# Patient Record
Sex: MALE | Race: WHITE | NOT HISPANIC OR LATINO
[De-identification: names, ages, dates, MRNs, and addresses within clinical notes are randomized per-mention and may not be internally consistent; named-entity substitution may affect disease eponyms.]

---

## 2017-01-09 ENCOUNTER — APPOINTMENT (OUTPATIENT)
Dept: INTERNAL MEDICINE | Facility: CLINIC | Age: 82
End: 2017-01-09

## 2017-01-09 VITALS
OXYGEN SATURATION: 97 % | DIASTOLIC BLOOD PRESSURE: 69 MMHG | BODY MASS INDEX: 26.12 KG/M2 | HEIGHT: 64 IN | TEMPERATURE: 97.7 F | SYSTOLIC BLOOD PRESSURE: 110 MMHG | HEART RATE: 57 BPM | WEIGHT: 153 LBS

## 2017-01-09 DIAGNOSIS — R25.2 CRAMP AND SPASM: ICD-10-CM

## 2017-01-10 LAB
25(OH)D3 SERPL-MCNC: 44.1 NG/ML
ALBUMIN SERPL ELPH-MCNC: 4 G/DL
ALP BLD-CCNC: 104 U/L
ALT SERPL-CCNC: 17 U/L
ANION GAP SERPL CALC-SCNC: 15 MMOL/L
AST SERPL-CCNC: 28 U/L
BASOPHILS # BLD AUTO: 0.03 K/UL
BASOPHILS NFR BLD AUTO: 0.3 %
BILIRUB SERPL-MCNC: 0.6 MG/DL
BUN SERPL-MCNC: 33 MG/DL
CALCIUM SERPL-MCNC: 10.3 MG/DL
CHLORIDE SERPL-SCNC: 101 MMOL/L
CHOLEST SERPL-MCNC: 151 MG/DL
CHOLEST/HDLC SERPL: 1.9 RATIO
CK SERPL-CCNC: 146 U/L
CO2 SERPL-SCNC: 20 MMOL/L
CREAT SERPL-MCNC: 1.38 MG/DL
EOSINOPHIL # BLD AUTO: 0.17 K/UL
EOSINOPHIL NFR BLD AUTO: 1.5 %
ERYTHROCYTE [SEDIMENTATION RATE] IN BLOOD BY WESTERGREN METHOD: 49 MM/HR
GLUCOSE SERPL-MCNC: 95 MG/DL
HBA1C MFR BLD HPLC: 5.8 %
HCT VFR BLD CALC: 34.3 %
HDLC SERPL-MCNC: 80 MG/DL
HGB BLD-MCNC: 10.8 G/DL
IMM GRANULOCYTES NFR BLD AUTO: 0.3 %
LDLC SERPL CALC-MCNC: 62 MG/DL
LYMPHOCYTES # BLD AUTO: 1.3 K/UL
LYMPHOCYTES NFR BLD AUTO: 11.5 %
MAN DIFF?: NORMAL
MCHC RBC-ENTMCNC: 31.5 GM/DL
MCHC RBC-ENTMCNC: 31.5 PG
MCV RBC AUTO: 100 FL
MONOCYTES # BLD AUTO: 1.32 K/UL
MONOCYTES NFR BLD AUTO: 11.6 %
NEUTROPHILS # BLD AUTO: 8.5 K/UL
NEUTROPHILS NFR BLD AUTO: 74.8 %
PLATELET # BLD AUTO: 206 K/UL
POTASSIUM SERPL-SCNC: 5.2 MMOL/L
PROT SERPL-MCNC: 7.1 G/DL
RBC # BLD: 3.43 M/UL
RBC # FLD: 14.8 %
SODIUM SERPL-SCNC: 136 MMOL/L
T4 SERPL-MCNC: 7 UG/DL
TRIGL SERPL-MCNC: 43 MG/DL
TSH SERPL-ACNC: 2.27 UU/ML
WBC # FLD AUTO: 11.35 K/UL

## 2017-03-08 ENCOUNTER — APPOINTMENT (OUTPATIENT)
Dept: INTERNAL MEDICINE | Facility: CLINIC | Age: 82
End: 2017-03-08

## 2017-03-08 VITALS
TEMPERATURE: 97.8 F | OXYGEN SATURATION: 86 % | BODY MASS INDEX: 26.12 KG/M2 | HEART RATE: 79 BPM | SYSTOLIC BLOOD PRESSURE: 141 MMHG | HEIGHT: 64 IN | DIASTOLIC BLOOD PRESSURE: 82 MMHG | WEIGHT: 153 LBS

## 2017-03-09 LAB
ALBUMIN SERPL ELPH-MCNC: 4.3 G/DL
ALP BLD-CCNC: 102 U/L
ALT SERPL-CCNC: 19 U/L
ANION GAP SERPL CALC-SCNC: 15 MMOL/L
AST SERPL-CCNC: 32 U/L
BASOPHILS # BLD AUTO: 0.02 K/UL
BASOPHILS NFR BLD AUTO: 0.3 %
BILIRUB SERPL-MCNC: 0.4 MG/DL
BUN SERPL-MCNC: 21 MG/DL
CALCIUM SERPL-MCNC: 10.4 MG/DL
CHLORIDE SERPL-SCNC: 100 MMOL/L
CO2 SERPL-SCNC: 25 MMOL/L
CREAT SERPL-MCNC: 1.01 MG/DL
EOSINOPHIL # BLD AUTO: 0.51 K/UL
EOSINOPHIL NFR BLD AUTO: 6.5 %
ERYTHROCYTE [SEDIMENTATION RATE] IN BLOOD BY WESTERGREN METHOD: 27 MM/HR
GLUCOSE SERPL-MCNC: 106 MG/DL
HCT VFR BLD CALC: 37.7 %
HGB BLD-MCNC: 12 G/DL
IMM GRANULOCYTES NFR BLD AUTO: 0.4 %
LYMPHOCYTES # BLD AUTO: 1.29 K/UL
LYMPHOCYTES NFR BLD AUTO: 16.4 %
MAN DIFF?: NORMAL
MCHC RBC-ENTMCNC: 31.7 PG
MCHC RBC-ENTMCNC: 31.8 GM/DL
MCV RBC AUTO: 99.5 FL
MONOCYTES # BLD AUTO: 1 K/UL
MONOCYTES NFR BLD AUTO: 12.7 %
NEUTROPHILS # BLD AUTO: 5.01 K/UL
NEUTROPHILS NFR BLD AUTO: 63.7 %
PLATELET # BLD AUTO: 194 K/UL
POTASSIUM SERPL-SCNC: 4.8 MMOL/L
PROT SERPL-MCNC: 7.2 G/DL
RBC # BLD: 3.79 M/UL
RBC # FLD: 15.1 %
SODIUM SERPL-SCNC: 140 MMOL/L
WBC # FLD AUTO: 7.86 K/UL

## 2017-04-21 PROBLEM — Z00.00 ENCOUNTER FOR PREVENTIVE HEALTH EXAMINATION: Status: ACTIVE | Noted: 2017-04-21

## 2017-05-03 ENCOUNTER — APPOINTMENT (OUTPATIENT)
Dept: INTERNAL MEDICINE | Facility: CLINIC | Age: 82
End: 2017-05-03

## 2017-06-04 ENCOUNTER — RX RENEWAL (OUTPATIENT)
Age: 82
End: 2017-06-04

## 2017-06-07 ENCOUNTER — APPOINTMENT (OUTPATIENT)
Dept: INTERNAL MEDICINE | Facility: CLINIC | Age: 82
End: 2017-06-07

## 2017-06-07 VITALS
BODY MASS INDEX: 24.43 KG/M2 | SYSTOLIC BLOOD PRESSURE: 131 MMHG | HEIGHT: 66 IN | HEART RATE: 78 BPM | WEIGHT: 152 LBS | DIASTOLIC BLOOD PRESSURE: 67 MMHG | OXYGEN SATURATION: 92 %

## 2017-06-08 LAB
25(OH)D3 SERPL-MCNC: 39.7 NG/ML
ALBUMIN SERPL ELPH-MCNC: 4.4 G/DL
ALP BLD-CCNC: 111 U/L
ALT SERPL-CCNC: 18 U/L
ANION GAP SERPL CALC-SCNC: 15 MMOL/L
AST SERPL-CCNC: 29 U/L
BASOPHILS # BLD AUTO: 0.03 K/UL
BASOPHILS NFR BLD AUTO: 0.4 %
BILIRUB SERPL-MCNC: 0.4 MG/DL
BUN SERPL-MCNC: 24 MG/DL
CALCIUM SERPL-MCNC: 10.3 MG/DL
CHLORIDE SERPL-SCNC: 101 MMOL/L
CHOLEST SERPL-MCNC: 170 MG/DL
CHOLEST/HDLC SERPL: 2 RATIO
CO2 SERPL-SCNC: 24 MMOL/L
CREAT SERPL-MCNC: 1.03 MG/DL
EOSINOPHIL # BLD AUTO: 0.45 K/UL
EOSINOPHIL NFR BLD AUTO: 6.1 %
ERYTHROCYTE [SEDIMENTATION RATE] IN BLOOD BY WESTERGREN METHOD: 36 MM/HR
GLUCOSE SERPL-MCNC: 98 MG/DL
HBA1C MFR BLD HPLC: 6.1 %
HCT VFR BLD CALC: 39.3 %
HDLC SERPL-MCNC: 85 MG/DL
HGB BLD-MCNC: 12.4 G/DL
IMM GRANULOCYTES NFR BLD AUTO: 0.3 %
LDLC SERPL CALC-MCNC: 75 MG/DL
LYMPHOCYTES # BLD AUTO: 1.66 K/UL
LYMPHOCYTES NFR BLD AUTO: 22.7 %
MAN DIFF?: NORMAL
MCHC RBC-ENTMCNC: 31.3 PG
MCHC RBC-ENTMCNC: 31.6 GM/DL
MCV RBC AUTO: 99.2 FL
MONOCYTES # BLD AUTO: 0.83 K/UL
MONOCYTES NFR BLD AUTO: 11.3 %
NEUTROPHILS # BLD AUTO: 4.33 K/UL
NEUTROPHILS NFR BLD AUTO: 59.2 %
PLATELET # BLD AUTO: 198 K/UL
POTASSIUM SERPL-SCNC: 4.9 MMOL/L
PROT SERPL-MCNC: 7.8 G/DL
RBC # BLD: 3.96 M/UL
RBC # FLD: 14.7 %
SODIUM SERPL-SCNC: 140 MMOL/L
T4 SERPL-MCNC: 6.9 UG/DL
TRIGL SERPL-MCNC: 49 MG/DL
TSH SERPL-ACNC: 2.61 UIU/ML
WBC # FLD AUTO: 7.32 K/UL

## 2017-09-18 ENCOUNTER — APPOINTMENT (OUTPATIENT)
Dept: INTERNAL MEDICINE | Facility: CLINIC | Age: 82
End: 2017-09-18
Payer: MEDICARE

## 2017-09-18 VITALS
HEIGHT: 66 IN | BODY MASS INDEX: 24.11 KG/M2 | DIASTOLIC BLOOD PRESSURE: 70 MMHG | HEART RATE: 80 BPM | WEIGHT: 150 LBS | SYSTOLIC BLOOD PRESSURE: 130 MMHG

## 2017-09-18 DIAGNOSIS — R31.9 HEMATURIA, UNSPECIFIED: ICD-10-CM

## 2017-09-18 LAB
BILIRUB UR QL STRIP: NEGATIVE
GLUCOSE UR-MCNC: NEGATIVE
HCG UR QL: 0.2 EU/DL
HGB UR QL STRIP.AUTO: NORMAL
KETONES UR-MCNC: NEGATIVE
LEUKOCYTE ESTERASE UR QL STRIP: NORMAL
NITRITE UR QL STRIP: NEGATIVE
PH UR STRIP: 5.5
PROT UR STRIP-MCNC: NORMAL
SP GR UR STRIP: 1.02

## 2017-09-18 PROCEDURE — 81002 URINALYSIS NONAUTO W/O SCOPE: CPT

## 2017-09-18 PROCEDURE — 36415 COLL VENOUS BLD VENIPUNCTURE: CPT

## 2017-09-18 PROCEDURE — 99214 OFFICE O/P EST MOD 30 MIN: CPT | Mod: 25

## 2017-09-19 LAB
ALBUMIN SERPL ELPH-MCNC: 4.5 G/DL
ALP BLD-CCNC: 112 U/L
ALT SERPL-CCNC: 16 U/L
ANION GAP SERPL CALC-SCNC: 16 MMOL/L
AST SERPL-CCNC: 28 U/L
BASOPHILS # BLD AUTO: 0.03 K/UL
BASOPHILS NFR BLD AUTO: 0.5 %
BILIRUB SERPL-MCNC: 0.4 MG/DL
BUN SERPL-MCNC: 21 MG/DL
CALCIUM SERPL-MCNC: 11 MG/DL
CHLORIDE SERPL-SCNC: 101 MMOL/L
CO2 SERPL-SCNC: 23 MMOL/L
CREAT SERPL-MCNC: 1.01 MG/DL
EOSINOPHIL # BLD AUTO: 0.26 K/UL
EOSINOPHIL NFR BLD AUTO: 3.9 %
GLUCOSE SERPL-MCNC: 91 MG/DL
HBA1C MFR BLD HPLC: 6 %
HCT VFR BLD CALC: 37.3 %
HGB BLD-MCNC: 11.9 G/DL
IMM GRANULOCYTES NFR BLD AUTO: 0.5 %
LYMPHOCYTES # BLD AUTO: 1.39 K/UL
LYMPHOCYTES NFR BLD AUTO: 21 %
MAN DIFF?: NORMAL
MCHC RBC-ENTMCNC: 31.8 PG
MCHC RBC-ENTMCNC: 31.9 GM/DL
MCV RBC AUTO: 99.7 FL
MONOCYTES # BLD AUTO: 0.81 K/UL
MONOCYTES NFR BLD AUTO: 12.2 %
NEUTROPHILS # BLD AUTO: 4.11 K/UL
NEUTROPHILS NFR BLD AUTO: 61.9 %
PLATELET # BLD AUTO: 186 K/UL
POTASSIUM SERPL-SCNC: 4.8 MMOL/L
PROT SERPL-MCNC: 7.3 G/DL
RBC # BLD: 3.74 M/UL
RBC # FLD: 14.7 %
SODIUM SERPL-SCNC: 140 MMOL/L
WBC # FLD AUTO: 6.63 K/UL

## 2017-09-22 ENCOUNTER — FORM ENCOUNTER (OUTPATIENT)
Age: 82
End: 2017-09-22

## 2017-09-23 ENCOUNTER — OUTPATIENT (OUTPATIENT)
Dept: OUTPATIENT SERVICES | Facility: HOSPITAL | Age: 82
LOS: 1 days | End: 2017-09-23
Payer: MEDICARE

## 2017-09-23 ENCOUNTER — APPOINTMENT (OUTPATIENT)
Dept: ULTRASOUND IMAGING | Facility: IMAGING CENTER | Age: 82
End: 2017-09-23
Payer: MEDICARE

## 2017-09-23 DIAGNOSIS — R31.9 HEMATURIA, UNSPECIFIED: ICD-10-CM

## 2017-09-23 PROCEDURE — 76770 US EXAM ABDO BACK WALL COMP: CPT | Mod: 26

## 2017-09-23 PROCEDURE — 76770 US EXAM ABDO BACK WALL COMP: CPT

## 2017-11-20 ENCOUNTER — RX RENEWAL (OUTPATIENT)
Age: 82
End: 2017-11-20

## 2017-12-13 ENCOUNTER — APPOINTMENT (OUTPATIENT)
Dept: INTERNAL MEDICINE | Facility: CLINIC | Age: 82
End: 2017-12-13
Payer: MEDICARE

## 2017-12-13 ENCOUNTER — LABORATORY RESULT (OUTPATIENT)
Age: 82
End: 2017-12-13

## 2017-12-13 VITALS
SYSTOLIC BLOOD PRESSURE: 125 MMHG | HEIGHT: 66 IN | BODY MASS INDEX: 24.59 KG/M2 | WEIGHT: 153 LBS | OXYGEN SATURATION: 95 % | DIASTOLIC BLOOD PRESSURE: 77 MMHG | HEART RATE: 63 BPM | TEMPERATURE: 97.8 F

## 2017-12-13 PROCEDURE — 36415 COLL VENOUS BLD VENIPUNCTURE: CPT

## 2017-12-13 PROCEDURE — 99214 OFFICE O/P EST MOD 30 MIN: CPT | Mod: 25

## 2017-12-14 LAB
25(OH)D3 SERPL-MCNC: 35.5 NG/ML
BASOPHILS # BLD AUTO: 0.02 K/UL
BASOPHILS NFR BLD AUTO: 0.3 %
CHOLEST SERPL-MCNC: 163 MG/DL
CHOLEST/HDLC SERPL: 1.9 RATIO
EOSINOPHIL # BLD AUTO: 0.43 K/UL
EOSINOPHIL NFR BLD AUTO: 5.8 %
ERYTHROCYTE [SEDIMENTATION RATE] IN BLOOD BY WESTERGREN METHOD: 18 MM/HR
HBA1C MFR BLD HPLC: 5.8 %
HCT VFR BLD CALC: 37.6 %
HDLC SERPL-MCNC: 85 MG/DL
HGB BLD-MCNC: 11.9 G/DL
IMM GRANULOCYTES NFR BLD AUTO: 0.3 %
LDLC SERPL CALC-MCNC: 68 MG/DL
LYMPHOCYTES # BLD AUTO: 1.52 K/UL
LYMPHOCYTES NFR BLD AUTO: 20.4 %
MAN DIFF?: NORMAL
MCHC RBC-ENTMCNC: 31.6 GM/DL
MCHC RBC-ENTMCNC: 32.1 PG
MCV RBC AUTO: 101.3 FL
MONOCYTES # BLD AUTO: 1.12 K/UL
MONOCYTES NFR BLD AUTO: 15 %
NEUTROPHILS # BLD AUTO: 4.35 K/UL
NEUTROPHILS NFR BLD AUTO: 58.2 %
PLATELET # BLD AUTO: 198 K/UL
RBC # BLD: 3.71 M/UL
RBC # FLD: 14.5 %
T4 SERPL-MCNC: 6.3 UG/DL
TRIGL SERPL-MCNC: 52 MG/DL
TSH SERPL-ACNC: 2.62 UIU/ML
WBC # FLD AUTO: 7.46 K/UL

## 2017-12-21 ENCOUNTER — MEDICATION RENEWAL (OUTPATIENT)
Age: 82
End: 2017-12-21

## 2018-01-04 ENCOUNTER — RX RENEWAL (OUTPATIENT)
Age: 83
End: 2018-01-04

## 2018-02-04 ENCOUNTER — RX RENEWAL (OUTPATIENT)
Age: 83
End: 2018-02-04

## 2018-02-18 ENCOUNTER — RX RENEWAL (OUTPATIENT)
Age: 83
End: 2018-02-18

## 2018-04-04 ENCOUNTER — APPOINTMENT (OUTPATIENT)
Dept: INTERNAL MEDICINE | Facility: CLINIC | Age: 83
End: 2018-04-04
Payer: MEDICARE

## 2018-04-04 VITALS
TEMPERATURE: 98.4 F | SYSTOLIC BLOOD PRESSURE: 115 MMHG | WEIGHT: 152 LBS | BODY MASS INDEX: 24.43 KG/M2 | HEART RATE: 94 BPM | OXYGEN SATURATION: 93 % | DIASTOLIC BLOOD PRESSURE: 69 MMHG | HEIGHT: 66 IN

## 2018-04-04 PROCEDURE — G0439: CPT

## 2018-04-23 ENCOUNTER — APPOINTMENT (OUTPATIENT)
Dept: INTERNAL MEDICINE | Facility: CLINIC | Age: 83
End: 2018-04-23
Payer: MEDICARE

## 2018-04-23 VITALS
DIASTOLIC BLOOD PRESSURE: 70 MMHG | BODY MASS INDEX: 24.43 KG/M2 | HEIGHT: 66 IN | WEIGHT: 152 LBS | SYSTOLIC BLOOD PRESSURE: 105 MMHG | TEMPERATURE: 98.3 F

## 2018-04-23 PROCEDURE — 99214 OFFICE O/P EST MOD 30 MIN: CPT

## 2018-04-29 ENCOUNTER — RX RENEWAL (OUTPATIENT)
Age: 83
End: 2018-04-29

## 2018-08-08 ENCOUNTER — APPOINTMENT (OUTPATIENT)
Dept: INTERNAL MEDICINE | Facility: CLINIC | Age: 83
End: 2018-08-08
Payer: MEDICARE

## 2018-08-08 VITALS
HEART RATE: 86 BPM | BODY MASS INDEX: 24.43 KG/M2 | OXYGEN SATURATION: 95 % | HEIGHT: 66 IN | TEMPERATURE: 97.9 F | DIASTOLIC BLOOD PRESSURE: 63 MMHG | SYSTOLIC BLOOD PRESSURE: 119 MMHG | WEIGHT: 152 LBS

## 2018-08-08 PROCEDURE — 36415 COLL VENOUS BLD VENIPUNCTURE: CPT

## 2018-08-08 PROCEDURE — 99214 OFFICE O/P EST MOD 30 MIN: CPT | Mod: 25

## 2018-08-08 NOTE — PHYSICAL EXAM
[No Acute Distress] : no acute distress [Well Nourished] : well nourished [Normal Outer Ear/Nose] : the outer ears and nose were normal in appearance [Normal Oropharynx] : the oropharynx was normal [No JVD] : no jugular venous distention [Supple] : supple [No Respiratory Distress] : no respiratory distress  [Clear to Auscultation] : lungs were clear to auscultation bilaterally [No Carotid Bruits] : no carotid bruits [No Edema] : there was no peripheral edema [Soft] : abdomen soft [No HSM] : no HSM [de-identified] : afib 70   gr 2-6

## 2018-08-08 NOTE — REVIEW OF SYSTEMS
[Negative] : Genitourinary [Fever] : no fever [Night Sweats] : no night sweats [Discharge] : no discharge [Vision Problems] : no vision problems [Earache] : no earache [Nasal Discharge] : no nasal discharge [Chest Pain] : no chest pain [Orthopena] : no orthopnea [FreeTextEntry5] : afib

## 2018-08-08 NOTE — HISTORY OF PRESENT ILLNESS
[FreeTextEntry1] : s/p fall   memory issue [de-identified] : Memory issue continue\par s/p fall  back on eliques\par hx of afib\par blood pressure and cholesterol on med

## 2018-08-08 NOTE — PLAN
[FreeTextEntry1] : afib with good bp and heart rate\par aortic stenosis being followed\par s/p fall doing ok back on eliques\par to check cholesterol and labs\par cognitive function about the same

## 2018-08-09 LAB
25(OH)D3 SERPL-MCNC: 47.3 NG/ML
ALBUMIN SERPL ELPH-MCNC: 4.5 G/DL
ALP BLD-CCNC: 60 U/L
ALT SERPL-CCNC: 21 U/L
ANION GAP SERPL CALC-SCNC: 16 MMOL/L
AST SERPL-CCNC: 30 U/L
BASOPHILS # BLD AUTO: 0.03 K/UL
BASOPHILS NFR BLD AUTO: 0.4 %
BILIRUB SERPL-MCNC: <0.2 MG/DL
BUN SERPL-MCNC: 15 MG/DL
CALCIUM SERPL-MCNC: 9.6 MG/DL
CHLORIDE SERPL-SCNC: 107 MMOL/L
CHOLEST SERPL-MCNC: 266 MG/DL
CHOLEST/HDLC SERPL: 3.6 RATIO
CO2 SERPL-SCNC: 22 MMOL/L
CREAT SERPL-MCNC: 0.76 MG/DL
EOSINOPHIL # BLD AUTO: 0.57 K/UL
EOSINOPHIL NFR BLD AUTO: 8.1 %
GLUCOSE SERPL-MCNC: 96 MG/DL
HBA1C MFR BLD HPLC: 6 %
HCT VFR BLD CALC: 36.4 %
HDLC SERPL-MCNC: 73 MG/DL
HGB BLD-MCNC: 11.4 G/DL
IMM GRANULOCYTES NFR BLD AUTO: 0.3 %
LDLC SERPL CALC-MCNC: 175 MG/DL
LYMPHOCYTES # BLD AUTO: 1.29 K/UL
LYMPHOCYTES NFR BLD AUTO: 18.2 %
MAN DIFF?: NORMAL
MCHC RBC-ENTMCNC: 30.6 PG
MCHC RBC-ENTMCNC: 31.3 GM/DL
MCV RBC AUTO: 97.6 FL
MONOCYTES # BLD AUTO: 0.91 K/UL
MONOCYTES NFR BLD AUTO: 12.9 %
NEUTROPHILS # BLD AUTO: 4.26 K/UL
NEUTROPHILS NFR BLD AUTO: 60.1 %
PLATELET # BLD AUTO: 183 K/UL
POTASSIUM SERPL-SCNC: 4.9 MMOL/L
PROT SERPL-MCNC: 7.1 G/DL
RBC # BLD: 3.73 M/UL
RBC # FLD: 14.8 %
SODIUM SERPL-SCNC: 145 MMOL/L
TRIGL SERPL-MCNC: 92 MG/DL
WBC # FLD AUTO: 7.08 K/UL

## 2018-08-10 LAB
T4 SERPL-MCNC: 6.5 UG/DL
TSH SERPL-ACNC: 3.21 UIU/ML

## 2018-08-16 ENCOUNTER — MEDICATION RENEWAL (OUTPATIENT)
Age: 83
End: 2018-08-16

## 2018-10-05 ENCOUNTER — EMERGENCY (EMERGENCY)
Facility: HOSPITAL | Age: 83
LOS: 1 days | Discharge: HOME | End: 2018-10-05
Attending: EMERGENCY MEDICINE
Payer: MEDICARE

## 2018-10-05 VITALS
OXYGEN SATURATION: 95 % | SYSTOLIC BLOOD PRESSURE: 149 MMHG | TEMPERATURE: 98 F | RESPIRATION RATE: 18 BRPM | DIASTOLIC BLOOD PRESSURE: 85 MMHG | HEART RATE: 86 BPM

## 2018-10-05 VITALS
HEART RATE: 88 BPM | SYSTOLIC BLOOD PRESSURE: 163 MMHG | RESPIRATION RATE: 20 BRPM | OXYGEN SATURATION: 95 % | DIASTOLIC BLOOD PRESSURE: 92 MMHG | TEMPERATURE: 98 F

## 2018-10-05 DIAGNOSIS — Z95.1 PRESENCE OF AORTOCORONARY BYPASS GRAFT: Chronic | ICD-10-CM

## 2018-10-05 LAB
ALBUMIN SERPL ELPH-MCNC: 4.4 G/DL — SIGNIFICANT CHANGE UP (ref 3.3–5)
ALP SERPL-CCNC: 102 U/L — SIGNIFICANT CHANGE UP (ref 40–120)
ALT FLD-CCNC: 21 U/L — SIGNIFICANT CHANGE UP (ref 10–45)
ANION GAP SERPL CALC-SCNC: 12 MMOL/L — SIGNIFICANT CHANGE UP (ref 5–17)
APTT BLD: 26.3 SEC — LOW (ref 27.5–37.4)
AST SERPL-CCNC: 37 U/L — SIGNIFICANT CHANGE UP (ref 10–40)
BASOPHILS # BLD AUTO: 0 K/UL — SIGNIFICANT CHANGE UP (ref 0–0.2)
BASOPHILS NFR BLD AUTO: 0.5 % — SIGNIFICANT CHANGE UP (ref 0–2)
BILIRUB SERPL-MCNC: 0.4 MG/DL — SIGNIFICANT CHANGE UP (ref 0.2–1.2)
BUN SERPL-MCNC: 30 MG/DL — HIGH (ref 7–23)
CALCIUM SERPL-MCNC: 11.1 MG/DL — HIGH (ref 8.4–10.5)
CHLORIDE SERPL-SCNC: 102 MMOL/L — SIGNIFICANT CHANGE UP (ref 96–108)
CO2 SERPL-SCNC: 24 MMOL/L — SIGNIFICANT CHANGE UP (ref 22–31)
CREAT SERPL-MCNC: 1.25 MG/DL — SIGNIFICANT CHANGE UP (ref 0.5–1.3)
EOSINOPHIL # BLD AUTO: 0.5 K/UL — SIGNIFICANT CHANGE UP (ref 0–0.5)
EOSINOPHIL NFR BLD AUTO: 6 % — SIGNIFICANT CHANGE UP (ref 0–6)
GLUCOSE SERPL-MCNC: 106 MG/DL — HIGH (ref 70–99)
HCT VFR BLD CALC: 37.4 % — LOW (ref 39–50)
HGB BLD-MCNC: 12.3 G/DL — LOW (ref 13–17)
INR BLD: 1.25 RATIO — HIGH (ref 0.88–1.16)
LYMPHOCYTES # BLD AUTO: 1.6 K/UL — SIGNIFICANT CHANGE UP (ref 1–3.3)
LYMPHOCYTES # BLD AUTO: 21.5 % — SIGNIFICANT CHANGE UP (ref 13–44)
MCHC RBC-ENTMCNC: 32.3 PG — SIGNIFICANT CHANGE UP (ref 27–34)
MCHC RBC-ENTMCNC: 32.9 GM/DL — SIGNIFICANT CHANGE UP (ref 32–36)
MCV RBC AUTO: 98 FL — SIGNIFICANT CHANGE UP (ref 80–100)
MONOCYTES # BLD AUTO: 0.9 K/UL — SIGNIFICANT CHANGE UP (ref 0–0.9)
MONOCYTES NFR BLD AUTO: 12 % — SIGNIFICANT CHANGE UP (ref 2–14)
NEUTROPHILS # BLD AUTO: 4.6 K/UL — SIGNIFICANT CHANGE UP (ref 1.8–7.4)
NEUTROPHILS NFR BLD AUTO: 60.1 % — SIGNIFICANT CHANGE UP (ref 43–77)
PLATELET # BLD AUTO: 188 K/UL — SIGNIFICANT CHANGE UP (ref 150–400)
POTASSIUM SERPL-MCNC: 5 MMOL/L — SIGNIFICANT CHANGE UP (ref 3.5–5.3)
POTASSIUM SERPL-SCNC: 5 MMOL/L — SIGNIFICANT CHANGE UP (ref 3.5–5.3)
PROT SERPL-MCNC: 7.8 G/DL — SIGNIFICANT CHANGE UP (ref 6–8.3)
PROTHROM AB SERPL-ACNC: 13.6 SEC — HIGH (ref 9.8–12.7)
RBC # BLD: 3.82 M/UL — LOW (ref 4.2–5.8)
RBC # FLD: 12.6 % — SIGNIFICANT CHANGE UP (ref 10.3–14.5)
SODIUM SERPL-SCNC: 138 MMOL/L — SIGNIFICANT CHANGE UP (ref 135–145)
TROPONIN T, HIGH SENSITIVITY RESULT: 41 NG/L — SIGNIFICANT CHANGE UP (ref 0–51)
WBC # BLD: 7.7 K/UL — SIGNIFICANT CHANGE UP (ref 3.8–10.5)
WBC # FLD AUTO: 7.7 K/UL — SIGNIFICANT CHANGE UP (ref 3.8–10.5)

## 2018-10-05 PROCEDURE — 90471 IMMUNIZATION ADMIN: CPT

## 2018-10-05 PROCEDURE — 70450 CT HEAD/BRAIN W/O DYE: CPT

## 2018-10-05 PROCEDURE — 12013 RPR F/E/E/N/L/M 2.6-5.0 CM: CPT

## 2018-10-05 PROCEDURE — 72125 CT NECK SPINE W/O DYE: CPT | Mod: 26

## 2018-10-05 PROCEDURE — 85730 THROMBOPLASTIN TIME PARTIAL: CPT

## 2018-10-05 PROCEDURE — 82962 GLUCOSE BLOOD TEST: CPT

## 2018-10-05 PROCEDURE — 71250 CT THORAX DX C-: CPT | Mod: 26

## 2018-10-05 PROCEDURE — 72170 X-RAY EXAM OF PELVIS: CPT | Mod: 26

## 2018-10-05 PROCEDURE — 70486 CT MAXILLOFACIAL W/O DYE: CPT

## 2018-10-05 PROCEDURE — 71250 CT THORAX DX C-: CPT

## 2018-10-05 PROCEDURE — 72125 CT NECK SPINE W/O DYE: CPT

## 2018-10-05 PROCEDURE — 90715 TDAP VACCINE 7 YRS/> IM: CPT

## 2018-10-05 PROCEDURE — 85610 PROTHROMBIN TIME: CPT

## 2018-10-05 PROCEDURE — 70486 CT MAXILLOFACIAL W/O DYE: CPT | Mod: 26

## 2018-10-05 PROCEDURE — 71045 X-RAY EXAM CHEST 1 VIEW: CPT

## 2018-10-05 PROCEDURE — 70450 CT HEAD/BRAIN W/O DYE: CPT | Mod: 26

## 2018-10-05 PROCEDURE — 99285 EMERGENCY DEPT VISIT HI MDM: CPT | Mod: 25,GC

## 2018-10-05 PROCEDURE — 71045 X-RAY EXAM CHEST 1 VIEW: CPT | Mod: 26

## 2018-10-05 PROCEDURE — 12013 RPR F/E/E/N/L/M 2.6-5.0 CM: CPT | Mod: GC

## 2018-10-05 PROCEDURE — 99285 EMERGENCY DEPT VISIT HI MDM: CPT | Mod: 25

## 2018-10-05 PROCEDURE — 84484 ASSAY OF TROPONIN QUANT: CPT

## 2018-10-05 PROCEDURE — 72170 X-RAY EXAM OF PELVIS: CPT

## 2018-10-05 PROCEDURE — 80053 COMPREHEN METABOLIC PANEL: CPT

## 2018-10-05 PROCEDURE — 85027 COMPLETE CBC AUTOMATED: CPT

## 2018-10-05 RX ORDER — TETANUS TOXOID, REDUCED DIPHTHERIA TOXOID AND ACELLULAR PERTUSSIS VACCINE, ADSORBED 5; 2.5; 8; 8; 2.5 [IU]/.5ML; [IU]/.5ML; UG/.5ML; UG/.5ML; UG/.5ML
0.5 SUSPENSION INTRAMUSCULAR ONCE
Qty: 0 | Refills: 0 | Status: COMPLETED | OUTPATIENT
Start: 2018-10-05 | End: 2018-10-05

## 2018-10-05 RX ORDER — LIDOCAINE 4 G/100G
1 CREAM TOPICAL ONCE
Qty: 0 | Refills: 0 | Status: COMPLETED | OUTPATIENT
Start: 2018-10-05 | End: 2018-10-05

## 2018-10-05 RX ADMIN — TETANUS TOXOID, REDUCED DIPHTHERIA TOXOID AND ACELLULAR PERTUSSIS VACCINE, ADSORBED 0.5 MILLILITER(S): 5; 2.5; 8; 8; 2.5 SUSPENSION INTRAMUSCULAR at 12:26

## 2018-10-05 RX ADMIN — LIDOCAINE 1 PATCH: 4 CREAM TOPICAL at 17:00

## 2018-10-05 NOTE — ED PROVIDER NOTE - PROGRESS NOTE DETAILS
Patient ambulating without difficulty. Patient offered admission for evaluation of syncope however the patient does not want to stay in the hospital. Patient would like to go home and follow up with his PMD. He was given an incentive spirometer and lidocaine patch. The patient has the capacity to refuse further medical evaluation and care. I had a lengthy discussion with the patient in which appropriate further evaluation and treatment was offered to the patient, and they declined. The patient understands that as a consequence of this decision they may be at risk for clinical deterioration including permanent disability and death, and the patient verbalized this understanding. Clear return precautions were discussed. The patient was urged to seek follow-up care, with appropriate referrals made as needed. Patient ambulating without difficulty. Patient offered admission for evaluation of syncope however the patient does not want to stay in the hospital. Patient would like to go home and follow up with his PMD. He was given an incentive spirometer and lidocaine patch. The patient has the capacity to refuse further medical evaluation and care. I had a lengthy discussion with the patient in which appropriate further evaluation and treatment was offered to the patient, and they declined. The patient understands that as a consequence of this decision they may be at risk for clinical deterioration including permanent disability and death, and the patient verbalized this understanding. Clear return precautions were discussed. The patient was urged to seek follow-up care, with appropriate referrals made as needed.  **ATTENDING ADDENDUM (Dr. Orville Foy): patient serially evaluated throughout ED course. NO acute deterioration up to this time in the ED. Agree with above notation by EM resident [place]. Anticipatory guidance provided to patient and family by ED team. NO evidence of intracerebral hemorrhage or cord/spine injury. Need for incentive spirometry reviewed with patient and family. Agree with discharge home with close outpatient followup with primary care physician/provider.

## 2018-10-05 NOTE — ED PROVIDER NOTE - ATTENDING CONTRIBUTION TO CARE
**ATTENDING ADDENDUM (Dr. Orville Foy): I attest that I have directly examined this patient and reviewed and formulated the diagnostic and therapeutic management plan in collaboration with the EM resident. Please see MDM note and remainder of EMR for findings from CC, HPI, ROS, and PE. (Lio)

## 2018-10-05 NOTE — ED PROCEDURE NOTE - PROCEDURE ADDITIONAL DETAILS
**ATTENDING ADDENDUM (Dr. Orville Foy): I was present during the key portions of the procedure and immediately available during the entire procedure. Agree with plan and management. Anticipatory guidance provided.
**ATTENDING ADDENDUM (Dr. Orville Foy): I was present during the key portions of the procedure and immediately available during the entire procedure. Agree with plan and management. Anticipatory guidance provided.

## 2018-10-05 NOTE — ED PROCEDURE NOTE - CPROC ED POST PROC CARE GUIDE1
Instructed patient/caregiver regarding signs and symptoms of infection./Verbal/written post procedure instructions were given to patient/caregiver./Instructed patient/caregiver to follow-up with primary care physician.
Instructed patient/caregiver to follow-up with primary care physician./Instructed patient/caregiver regarding signs and symptoms of infection./Verbal/written post procedure instructions were given to patient/caregiver./**ATTENDING ADDENDUM (Dr. Orville Foy): Anticipatory guidance provided.

## 2018-10-05 NOTE — ED PROVIDER NOTE - SKIN WOUND TYPE
**ATTENDING ADDENDUM (Dr. Orville Foy): eyebrow laceration approximately 3.5 cm, lip laceration approximately 2 cm total length/LACERATION(S)

## 2018-10-05 NOTE — ED PROVIDER NOTE - RESPIRATORY, MLM
Breath sounds clear and equal bilaterally. **ATTENDING ADDENDUM (Dr. Orville Foy): BREATHING CLEAR. POSITIVE BILATERAL BREATH SOUNDS auscultated. NO stridor, drooling, tripoding, or wheezing. POSITIVE bilateral chest wall expansion WITHOUT crepitus. POSITIVE tenderness along the right chest wall and ribs. NO deformity. POSITIVE midline trachea.

## 2018-10-05 NOTE — CONSULT NOTE ADULT - ASSESSMENT
recs to follow ASSESSMENT: Patient is a 89y old man with afib on AC s/p unwitnessed fall with right lip laceration, right acute rib fractures x2    PLAN:    - Follow up official CT reads  - Multimodal pain control for rib fractures  - Will discuss with Dr. Mcgowan  - Patient seen/examined with attending.    MATT Matta MD PGY4 [id number] ASSESSMENT: Patient is a 89y old man with afib on AC s/p unwitnessed fall with right lip and eyebrow lacerations, right acute rib fractures x2    PLAN:    - Follow up official CT chest read  - Multimodal pain control for rib fractures; patient's wife counseled on importance of ambulation, deep breathing exercises, pain control to prevent pneumonia  - Closure of lip/eyebrow lacerations per ED  - Discussed with Dr. Roslyn Matta MD PGY4 [id number]

## 2018-10-05 NOTE — ED PROVIDER NOTE - CONDUCTED A DETAILED DISCUSSION WITH PATIENT AND/OR GUARDIAN REGARDING, MDM
return to ED if symptoms worsen, persist or questions arise/**ATTENDING ADDENDUM (Dr. Orville Foy): Anticipatory guidance provided./radiology results/need for outpatient follow-up/lab results

## 2018-10-05 NOTE — ED PROVIDER NOTE - CROS ED NEURO POS
**ATTENDING ADDENDUM (Dr. Orville Foy): ?LOC as cause for fall v. mechanical v. syncope v. near-syncope.

## 2018-10-05 NOTE — ED ADULT NURSE REASSESSMENT NOTE - NS ED NURSE REASSESS COMMENT FT1
Troponin 45, repeated 41. Xray dx right rir fractures. Pt instructed on incentive spirometry use. Pt ambulate with steady gate. Discharged home to follow up with PMD. VSS and wife at bedside. Dissolvable sutures in place. Troponin 45, repeated 41. Xray dx right rib fractures. Pt instructed on incentive spirometry use. Pt ambulate with steady gate. Discharged home to follow up with PMD. VSS and wife at bedside. Dissolvable sutures in place.

## 2018-10-05 NOTE — ED PROVIDER NOTE - PLAN OF CARE
1. Follow up with your primary care physician within 2-3days for reevaluation.  2.  Return to the Emergency Department for worsening, progressive or any other concerning symptoms.  3.  Please take tylenol 650 mg every 4 hours as needed for pain. Please do not exceed more than 4,000mg of Tylenol in a day   4. Please apply lidocaine patch to right ribs once per day for 12 hours at a time. These are available over the counter. ATTENDING ADDENDUM (Dr. Orville Foy): Goals of care include resolution of emergent/urgent symptoms and concerns, and restoration to baseline level of homeostasis.

## 2018-10-05 NOTE — ED PROVIDER NOTE - MUSCULOSKELETAL MINIMAL EXAM
TENDERNESS/**ATTENDING ADDENDUM (Dr. Orville Foy): NO cervical-thoracic-lumbar-sacral midline bony tenderness or stepoff./neck supple/normal range of motion/motor intact

## 2018-10-05 NOTE — ED PROVIDER NOTE - OBJECTIVE STATEMENT
89yo male PMH atrial fibrillation on Eliquis, dementia, BPH presenting with head injury, lacerations and R chest pain s/p fall in parking lot, unknown if syncope vs mechanical as patient does not recall event. patient fell from standing. last tetanus unknown. No vomiting, no visual changes. Did not attempt ambulation afterward. No chest pain or shortness of breath. 89yo male PMH atrial fibrillation on Eliquis, dementia, BPH presenting with head injury, lacerations and R chest pain s/p fall in parking lot, unknown if syncope vs mechanical as patient does not recall event. patient fell from standing. last tetanus unknown. No vomiting, no visual changes. Did not attempt ambulation afterward. No chest pain or shortness of breath.  **ATTENDING ADDENDUM (Dr. Orville Foy): I attest that I have directly and personally interviewed and examined this patient and elicited a comparable history of present illness and review of systems as documented, along with my EM resident. I attest that I have made significant contributions to the documentation where necessary and as noted in the EMR. 89yo male PMH atrial fibrillation on Eliquis, dementia, BPH presenting with head injury, lacerations and R chest pain s/p fall in parking lot, unknown if syncope vs mechanical as patient does not recall event. patient fell from standing. last tetanus unknown. No vomiting, no visual changes. Did not attempt ambulation afterward. No chest pain or shortness of breath.  **ATTENDING ADDENDUM (Dr. Orville Foy): I attest that I have directly and personally interviewed and examined this patient and elicited a comparable history of present illness and review of systems as documented, along with my EM resident. I attest that I have made significant contributions to the documentation where necessary and as noted in the EMR. Noted with lip and right eyebrow lacerations (lip laceration is not through-and-through). Also noted with bruising and soft-tissue swelling over face/lip.

## 2018-10-05 NOTE — CONSULT NOTE ADULT - SUBJECTIVE AND OBJECTIVE BOX
TRAUMA SERVICE (Acute Care Surgery / [facility] - #[id number]) - CONSULT NOTE  --------------------------------------------------------------------------------------------    TRAUMA ACTIVATION LEVEL:     MECHANISM OF INJURY:      [] Blunt  	[] MVC	[] Fall	[] Pedestrian Struck	[] Motorcycle accident      [] Penetrating  	[] Gun Shot Wound 		[] Stab Wound    GCS: 	E: 4	V: 5	M: 6    Patient is a 89y old  Male who presents with a chief complaint of     HPI: ***    Primary Survey:  ***  A - airway intact  B - bilateral breath sounds and good chest rise  C - initial BP  BP: -- *** , HR HR: -- *** , palpable pulses in all extremities  D - GCS 15 on arrival  Exposure obtained      Secondary Survey: ***  General: NAD  HEENT: Normocephalic, atraumatic, EOMI, PEERLA.  Neck: Soft, midline trachea.  Chest: No chest wall tenderness.   Cardiac: S1, S2, RRR  Respiratory: Bilateral breath sounds, clear and equal bilaterally  Abdomen: Soft, non-distended, non-tender, no rebound, no guarding, no masses palpated  Groin: Normal appearing  Ext: palp radial b/l UE, b/l DP palp in Lower Extrem, motor and sensory grossly intact in all 4 extremities  Back: no TTP, no palpable runoff/stepoff/deformity  Rectal: No nika blood, MARY JANE with good tone    Patient denies fevers/chills, denies lightheadedness/dizziness, denies SOB/chest pain, denies nausea/vomiting, denies constipation/diarrhea.  ***    ROS: 10-system review is otherwise negative except HPI above.      PAST MEDICAL & SURGICAL HISTORY:    FAMILY HISTORY:    [] Family history not pertinent as reviewed with the patient and family    SOCIAL HISTORY:  ***    ALLERGIES: No Known Allergies      HOME MEDICATIONS: ***    CURRENT MEDICATIONS  MEDICATIONS (STANDING): diphtheria/tetanus/pertussis (acellular) Vaccine (ADAcel) 0.5 milliLiter(s) IntraMuscular once    MEDICATIONS (PRN):  --------------------------------------------------------------------------------------------    Vitals:   T(C): --  HR: --  BP: --  RR: --  SpO2: --  CAPILLARY BLOOD GLUCOSE      POCT Blood Glucose.: 113 mg/dL (05 Oct 2018 11:42)    CAPILLARY BLOOD GLUCOSE      POCT Blood Glucose.: 113 mg/dL (05 Oct 2018 11:42)          PHYSICAL EXAM:   General: No distress, alert and oriented x3  HEENT: Lip laceration with swelling, ecchymosis; laceration over right eyebrow  Neck: Soft, midline trachea.  Chest: Right chest wall tenderness, no ecchymosis  Cardiac: Irregularly irregular  Respiratory: Bilateral breath sounds, clear and equal bilaterally  Abdomen: Soft, nondistended, nontender; epigastric hernia, nontender and reducible  Groin: Normal appearing  Pelvis: stable  Ext: palp radial b/l UE, b/l DP palp in lower extremities   Back: no TTP, no palpable runoff/stepoff/deformity  Perineum: atraumatic    --------------------------------------------------------------------------------------------    LABS                --------------------------------------------------------------------------------------------    MICROBIOLOGY      --------------------------------------------------------------------------------------------    IMAGING  ***    --------------------------------------------------------------------------------------------    ASSESSMENT: Patient is a 89y old m with ***    PLAN:  ***  -   -   -   -   - Patient seen/examined with attending.  - Plan to be discussed with Attending,  TRAUMA SERVICE (Acute Care Surgery / [facility] - #[id number]) - CONSULT NOTE  --------------------------------------------------------------------------------------------    TRAUMA ACTIVATION LEVEL: Level 2    MECHANISM OF INJURY:      [X] Blunt  	[] MVC	[X] Fall	[] Pedestrian Struck	[] Motorcycle accident      [] Penetrating  	[] Gun Shot Wound 		[] Stab Wound    GCS: 15	E: 4	V: 5	M: 6    HPI: Patient is a 89y old man with afib on eliquis who presents after an unwitnessed fall in a parking lot this morning.  He remembers going to the bank but does not recall falling.  Now, he complains of right chest wall pain.  No shortness of breath, abdominal pain.      Primary Survey:    A - airway intact  B - bilateral breath sounds and good chest rise  C - initial BP  BP: , HR HR: 80, palpable pulses in all extremities  D - GCS 15 on arrival  Exposure obtained    Secondary Survey:   General: No distress, alert and oriented x3  HEENT: Lip laceration with swelling, ecchymosis; laceration over right eyebrow  Neck: Soft, midline trachea  Chest: Right chest wall tenderness, no ecchymosis  Cardiac: Irregularly irregular  Respiratory: Bilateral breath sounds, clear and equal bilaterally  Abdomen: Soft, nondistended, nontender; epigastric hernia, nontender and reducible  Groin: Normal appearing  Pelvis: Stable  Ext: palp radial b/l UE, b/l DP palp in lower extremities   Back: no TTP, no palpable runoff/stepoff/deformity  Perineum: atraumatic    Patient denies fevers/chills, denies lightheadedness/dizziness, denies SOB/chest pain, denies nausea/vomiting, denies constipation/diarrhea.      ROS: 10-system review is otherwise negative except HPI above.      PAST MEDICAL & SURGICAL HISTORY: HTN, HLD, BPH, afib on eliquis; s/p CABG x5    FAMILY HISTORY: no significant history    SOCIAL HISTORY:  nonsmoker; lives with wife    ALLERGIES: No Known Allergies    HOME MEDICATIONS: see medication reconciliation    CURRENT MEDICATIONS  MEDICATIONS (STANDING): diphtheria/tetanus/pertussis (acellular) Vaccine (ADAcel) 0.5 milliLiter(s) IntraMuscular once  --------------------------------------------------------------------------------------------      PHYSICAL EXAM:   General: No distress, alert and oriented x3  HEENT: Lip laceration with swelling, ecchymosis; laceration over right eyebrow  Neck: Soft, midline trachea.  Chest: Right chest wall tenderness, no ecchymosis  Cardiac: Irregularly irregular  Respiratory: Bilateral breath sounds, clear and equal bilaterally  Abdomen: Soft, nondistended, nontender; epigastric hernia, nontender and reducible  Groin: Normal appearing  Pelvis: stable  Ext: palp radial b/l UE, b/l DP palp in lower extremities   Back: no TTP, no palpable runoff/stepoff/deformity  Perineum: atraumatic    --------------------------------------------------------------------------------------------    LABS    CBC (10-05 @ 12:08)                          12.3<L>                   7.7     )--------------(  188        60.1  % Neuts, 21.5  % Lymphs, ANC: 4.6                             37.4<L>    BMP (10-05 @ 12:08)       138     |  102     |  30<H> 			Ca++ --      Ca 11.1<H>       ---------------------------------( 106<H>		Mg --           5.0     |  24      |  1.25  			Ph --        LFTs (10-05 @ 12:08)      TPro 7.8 / Alb 4.4 / TBili 0.4 / DBili -- / AST 37 / ALT 21 / AlkPhos 102    Coags (10-05 @ 12:08)  aPTT 26.3<L> / INR 1.25<H> / PT 13.6<H>    --------------------------------------------------------------------------------------------    IMAGING  CT head/c-spine/max face with no evidence of acute traumatic injury on preliminary read  CT chest with 2 right acute rib fractures, 3 chronic rib fractures  -------------------------------------------------------------------------------------------- TRAUMA SERVICE (Acute Care Surgery / [facility] - #[id number]) - CONSULT NOTE  --------------------------------------------------------------------------------------------    TRAUMA ACTIVATION LEVEL: Level 2    MECHANISM OF INJURY:      [X] Blunt  	[] MVC	[X] Fall	[] Pedestrian Struck	[] Motorcycle accident      [] Penetrating  	[] Gun Shot Wound 		[] Stab Wound    GCS: 15	E: 4	V: 5	M: 6    HPI: Patient is a 89y old man with afib on eliquis who presents after an unwitnessed fall in a parking lot this morning.  He remembers going to the bank but does not recall falling.  Now, he complains of right chest wall soreness.  No shortness of breath, abdominal pain.    He pulls 1500 ml on incentive spirometry.  He has had a recent fall in april, during which he was hospitalized for a day per wife.  Unknown injuries at that time.    Primary Survey:    A - airway intact  B - bilateral breath sounds and good chest rise  C - initial BP  BP: , HR HR: 80, palpable pulses in all extremities  D - GCS 15 on arrival  Exposure obtained    Secondary Survey:   General: No distress, alert and oriented x3  HEENT: Lip laceration with swelling, ecchymosis; laceration over right eyebrow  Neck: Soft, midline trachea  Chest: Right chest wall tenderness, no ecchymosis  Cardiac: Irregularly irregular  Respiratory: Bilateral breath sounds, clear and equal bilaterally  Abdomen: Soft, nondistended, nontender; epigastric hernia, nontender and reducible  Groin: Normal appearing  Pelvis: Stable  Ext: palp radial b/l UE, b/l DP palp in lower extremities   Back: no TTP, no palpable runoff/stepoff/deformity  Perineum: atraumatic    Patient denies fevers/chills, denies lightheadedness/dizziness, denies SOB/chest pain, denies nausea/vomiting, denies constipation/diarrhea.      ROS: 10-system review is otherwise negative except HPI above.      PAST MEDICAL & SURGICAL HISTORY: HTN, HLD, BPH, afib on eliquis; s/p CABG x5    FAMILY HISTORY: no significant history    SOCIAL HISTORY:  nonsmoker; lives with wife    ALLERGIES: No Known Allergies    HOME MEDICATIONS: see medication reconciliation    CURRENT MEDICATIONS  MEDICATIONS (STANDING): diphtheria/tetanus/pertussis (acellular) Vaccine (ADAcel) 0.5 milliLiter(s) IntraMuscular once  --------------------------------------------------------------------------------------------      PHYSICAL EXAM:   General: No distress, alert and oriented x3  HEENT: Lip laceration with swelling, ecchymosis; laceration over right eyebrow  Neck: Soft, midline trachea.  Chest: Right chest wall tenderness, no ecchymosis  Cardiac: Irregularly irregular  Respiratory: Bilateral breath sounds, clear and equal bilaterally  Abdomen: Soft, nondistended, nontender; epigastric hernia, nontender and reducible  Groin: Normal appearing  Pelvis: stable  Ext: palp radial b/l UE, b/l DP palp in lower extremities   Back: no TTP, no palpable runoff/stepoff/deformity  Perineum: atraumatic    --------------------------------------------------------------------------------------------    LABS    CBC (10-05 @ 12:08)                          12.3<L>                   7.7     )--------------(  188        60.1  % Neuts, 21.5  % Lymphs, ANC: 4.6                             37.4<L>    BMP (10-05 @ 12:08)       138     |  102     |  30<H> 			Ca++ --      Ca 11.1<H>       ---------------------------------( 106<H>		Mg --           5.0     |  24      |  1.25  			Ph --        LFTs (10-05 @ 12:08)      TPro 7.8 / Alb 4.4 / TBili 0.4 / DBili -- / AST 37 / ALT 21 / AlkPhos 102    Coags (10-05 @ 12:08)  aPTT 26.3<L> / INR 1.25<H> / PT 13.6<H>    --------------------------------------------------------------------------------------------    IMAGING  CT head/c-spine/max face with no evidence of acute traumatic injury  CT chest with 2 right acute rib fractures, 3 chronic rib fractures on preliminary read  --------------------------------------------------------------------------------------------

## 2018-10-05 NOTE — ED PROVIDER NOTE - PHYSICAL EXAMINATION
Gen: NAD, AOx3  Head: R eyebrow laceration actively oozing blood  HEENT: Lower inner lip laceration actively oozing blood, PERRL, EOMI, oral mucosa moist, normal conjunctiva  Lung: CTAB, no respiratory distress, no wheezing, rales, rhonchi  CV: +right chest wall tenderness, normal s1/s2, irregular, no murmurs, Normal perfusion  Abd: soft, NTND  MSK: No edema, no visible deformities, full range of motion in all 4 extremities  Neuro: No focal neurologic deficits  Skin: laceration to right eyebrow Gen: NAD, AOx3  Head: R eyebrow laceration actively oozing blood  HEENT: Lower inner lip laceration actively oozing blood, PERRL, EOMI, oral mucosa moist, normal conjunctiva  Lung: CTAB, no respiratory distress, no wheezing, rales, rhonchi  CV: +right chest wall tenderness, normal s1/s2, irregular, no murmurs, Normal perfusion  Abd: soft, NTND  MSK: No edema, no visible deformities, full range of motion in all 4 extremities  Neuro: No focal neurologic deficits  Skin: laceration to right eyebrow  **ATTENDING ADDENDUM (Dr. Orville Foy): I have reviewed and substantially contributed to the elements of the PE as documented above. I have directly performed an examination of this patient in conjunction with the other members (EM resident/PA/NP) of the patient care team.

## 2018-10-05 NOTE — ED PROVIDER NOTE - CARE PLAN
Principal Discharge DX:	Laceration of face, multiple sites  Assessment and plan of treatment:	1. Follow up with your primary care physician within 2-3days for reevaluation.  2.  Return to the Emergency Department for worsening, progressive or any other concerning symptoms.  3.  Please take tylenol 650 mg every 4 hours as needed for pain. Please do not exceed more than 4,000mg of Tylenol in a day   4. Please apply lidocaine patch to right ribs once per day for 12 hours at a time. These are available over the counter.  Secondary Diagnosis:	Rib fracture Principal Discharge DX:	Laceration of face, multiple sites  Goal:	ATTENDING ADDENDUM (Dr. Orville Foy): Goals of care include resolution of emergent/urgent symptoms and concerns, and restoration to baseline level of homeostasis.  Assessment and plan of treatment:	1. Follow up with your primary care physician within 2-3days for reevaluation.  2.  Return to the Emergency Department for worsening, progressive or any other concerning symptoms.  3.  Please take tylenol 650 mg every 4 hours as needed for pain. Please do not exceed more than 4,000mg of Tylenol in a day   4. Please apply lidocaine patch to right ribs once per day for 12 hours at a time. These are available over the counter.  Secondary Diagnosis:	Rib fracture

## 2018-10-05 NOTE — ED PROVIDER NOTE - CPE EDP GU CVA TENDER
no tenderness/**ATTENDING ADDENDUM (Dr. Orville Foy): right-sided chest wall tenderness as noted above.

## 2018-10-05 NOTE — ED PROVIDER NOTE - GASTROINTESTINAL, MLM
Abdomen soft, non-tender **ATTENDING ADDENDUM (Dr. Orville Foy): non-distended. NO guarding, rebound, or rigidity. NO pulsatile or non-pulsatile masses. NO hernias. NO obvious hepatosplenomegaly.

## 2018-10-05 NOTE — ED ADULT NURSE NOTE - NSIMPLEMENTINTERV_GEN_ALL_ED
Implemented All Fall with Harm Risk Interventions:  [place] to call system. Call bell, personal items and telephone within reach. Instruct patient to call for assistance. Room bathroom lighting operational. Non-slip footwear when patient is off stretcher. Physically safe environment: no spills, clutter or unnecessary equipment. Stretcher in lowest position, wheels locked, appropriate side rails in place. Provide visual cue, wrist band, yellow gown, etc. Monitor gait and stability. Monitor for mental status changes and reorient to person, place, and time. Review medications for side effects contributing to fall risk. Reinforce activity limits and safety measures with patient and family. Provide visual clues: red socks.

## 2018-10-05 NOTE — ED PROVIDER NOTE - EYES, MLM
Clear bilaterally, pupils equal, round and reactive to light. **ATTENDING ADDENDUM (Dr. Orville Foy): Extraocular muscle movements intact. Clear corneas bilaterally, pupils equal and round.

## 2018-10-05 NOTE — ED PROVIDER NOTE - NSFOLLOWUPINSTRUCTIONS_ED_ALL_ED_FT
1. Follow up with your primary care physician within 2-3days for reevaluation.  2.  Return to the Emergency Department for worsening, progressive or any other concerning symptoms.  3.  Please take tylenol 650 mg every 4 hours as needed for pain. Please do not exceed more than 4,000mg of Tylenol in a day   4. Please apply lidocaine patch to right ribs once per day for 12 hours at a time. These are available over the counter.

## 2018-10-05 NOTE — ED PROVIDER NOTE - DURATION OF LOSS OF CONSCIOUSNESS
**ATTENDING ADDENDUM (Dr. Orville Foy): unknown if loss of consciousness (syncope v. near-syncope) or mechanical fall (patient is a poor historian)/unknown

## 2018-10-05 NOTE — ED PROVIDER NOTE - MEDICAL DECISION MAKING DETAILS
89yo male with lacerations and right chest pain s/p fall, unknown syncope vs mechanical, will check labs, EKG, xray, ct head/c-spine/chest, reassess. Birdie Vaca DO 89yo male with lacerations and right chest pain s/p fall, unknown syncope vs mechanical, will check labs, EKG, xray, ct head/c-spine/chest, reassess. Birdie Vaca DO  **ATTENDING MEDICAL DECISION MAKING/SYNTHESIS (Dr. Orville Foy): I have reviewed the Chief Complaint, the HPI, the ROS, and have directly performed and confirmed the findings on the Physical Examination. I have reviewed the medical decision making with all providers, as applicable. The PROBLEM REPRESENTATION at this time is: 89-year-old man with history of atrial fibrillation on Eliquis now presenting s/p fall with head trauma, laceration, and right-sided chest wall pain. Mechanism of fall: uncertain (mechanical v. syncope v. near-syncope). The MOST LIKELY DIAGNOSIS, and the LIST OF DIFFERENTIAL DIAGNOSES, includes (but is not limited to) the following: SEQUELA OF FALL: intracerebral hemorrhage, concussion, cord/spine injury, intra-thoracic hemorrhage (hemothorax, pneumothorax), rib fracture(s) or flail chest, intra-abdominal hemorrhage (hemoperitoneum), pelvis or other extremity fracture, CAUSE FOR FALL: dehydration, syncope, near-syncope, arrhythmia, cerebrovascular accident, transient ischemic attack, acute coronary syndrome. The likelihood of each of these diagnoses has been appropriately considered in the context of this patient's presentation and my evaluation. PLAN: as described in EMR, including diagnostics, therapeutics and consultation as clinically warranted. I will continue to reevaluate the patient, including the results of all testing, and monitor response to therapy throughout the patient's course in the ED.

## 2018-10-05 NOTE — ED PROVIDER NOTE - FALL CAUSE
slipping, stumbling. tripping/**ATTENDING ADDENDUM (Dr. Orville Foy): uncertain if mechanical or syncope v. near-syncope.

## 2018-10-05 NOTE — ED PROVIDER NOTE - ENMT, MLM
Airway patent, Nasal mucosa clear. Mouth with normal mucosa. Throat has no vesicles, no oropharyngeal exudates and uvula is midline. **ATTENDING ADDENDUM (Dr. Orville Foy): AIRWAY CLEAR. NO pooling of secretions, POSITIVE gag reflex, NO debris, ABLE TO SELF-PROTECT AIRWAY. POSITIVE lower lip laceration (not through-and-through).

## 2018-10-05 NOTE — ED PROCEDURE NOTE - CPROC ED TOLERANCE1
Patient tolerated procedure well. Patient tolerated procedure well./**ATTENDING ADDENDUM (Dr. Orville Foy): Anticipatory guidance provided.

## 2018-10-05 NOTE — ED PROCEDURE NOTE - ATTENDING CONTRIBUTION TO CARE
**ATTENDING ADDENDUM (Dr. Orville Foy): I attest that I have directly examined this patient and reviewed and formulated the diagnostic and therapeutic management plan in collaboration with the EM resident. Please see MDM note and remainder of EMR for findings from CC, HPI, ROS, and PE. (Lio)
**ATTENDING ADDENDUM (Dr. Orville Foy): I attest that I have directly examined this patient and reviewed and formulated the diagnostic and therapeutic management plan in collaboration with the EM resident. Please see MDM note and remainder of EMR for findings from CC, HPI, ROS, and PE. (Lio)

## 2018-10-08 ENCOUNTER — APPOINTMENT (OUTPATIENT)
Dept: INTERNAL MEDICINE | Facility: CLINIC | Age: 83
End: 2018-10-08
Payer: MEDICARE

## 2018-10-08 DIAGNOSIS — S22.49XD MULTIPLE FRACTURES OF RIBS, UNSPECIFIED SIDE, SUBSEQUENT ENCOUNTER FOR FRACTURE WITH ROUTINE HEALING: ICD-10-CM

## 2018-10-08 DIAGNOSIS — S09.90XA UNSPECIFIED INJURY OF HEAD, INITIAL ENCOUNTER: ICD-10-CM

## 2018-10-08 DIAGNOSIS — I35.0 NONRHEUMATIC AORTIC (VALVE) STENOSIS: ICD-10-CM

## 2018-10-08 DIAGNOSIS — R55 SYNCOPE AND COLLAPSE: ICD-10-CM

## 2018-10-08 PROBLEM — I48.91 UNSPECIFIED ATRIAL FIBRILLATION: Chronic | Status: ACTIVE | Noted: 2018-10-05

## 2018-10-08 PROBLEM — I10 ESSENTIAL (PRIMARY) HYPERTENSION: Chronic | Status: ACTIVE | Noted: 2018-10-05

## 2018-10-08 PROBLEM — N40.0 BENIGN PROSTATIC HYPERPLASIA WITHOUT LOWER URINARY TRACT SYMPTOMS: Chronic | Status: ACTIVE | Noted: 2018-10-05

## 2018-10-08 PROCEDURE — 99214 OFFICE O/P EST MOD 30 MIN: CPT

## 2018-10-08 NOTE — HISTORY OF PRESENT ILLNESS
[FreeTextEntry1] : f/u er visit with syncope and rib fractures [de-identified] : on october 5 going to "[employer]  collapsed and fell(No memory)\par Taken to [place]\par Trauma to face and multiple fracture ribs\par Had ct of head and neg\par EKG ok\par History of aortic stenosis\par Was feeling well that day and did not miss food or pills

## 2018-10-08 NOTE — PHYSICAL EXAM
[No Acute Distress] : no acute distress [Well Nourished] : well nourished [Normal Outer Ear/Nose] : the outer ears and nose were normal in appearance [Normal Oropharynx] : the oropharynx was normal [No JVD] : no jugular venous distention [Supple] : supple [No Respiratory Distress] : no respiratory distress  [Clear to Auscultation] : lungs were clear to auscultation bilaterally [Normal Rate] : normal rate  [Soft] : abdomen soft [No HSM] : no HSM [No Joint Swelling] : no joint swelling [de-identified] : Evidence of facial trauma with swelling suture and hematoma  [de-identified] : chest wall pain [de-identified] : gregory  gr 3/6 [de-identified] : unsteady gsit

## 2018-10-08 NOTE — PLAN
[FreeTextEntry1] : S/p  with head trauma and multiple rib fracture requiring suture\par syncopal episode\par BP low will stop quinapril\par Patient to get urgent visit cardiology\par r/o arrhythmia or aortic stenosis as causes\par Consider loop recorder and additional cardiac work up

## 2018-10-08 NOTE — REVIEW OF SYSTEMS
[Chest Pain] : chest pain [Muscle Pain] : muscle pain [Back Pain] : back pain [Fever] : no fever [Night Sweats] : no night sweats [Discharge] : no discharge [Vision Problems] : no vision problems [Earache] : no earache [Orthopena] : no orthopnea [Shortness Of Breath] : no shortness of breath [Wheezing] : no wheezing [Itching] : no itching [FreeTextEntry5] : afib

## 2018-11-29 ENCOUNTER — RX RENEWAL (OUTPATIENT)
Age: 83
End: 2018-11-29

## 2018-12-12 ENCOUNTER — APPOINTMENT (OUTPATIENT)
Dept: INTERNAL MEDICINE | Facility: CLINIC | Age: 83
End: 2018-12-12
Payer: MEDICARE

## 2018-12-12 VITALS
DIASTOLIC BLOOD PRESSURE: 80 MMHG | SYSTOLIC BLOOD PRESSURE: 130 MMHG | WEIGHT: 150 LBS | BODY MASS INDEX: 24.21 KG/M2 | OXYGEN SATURATION: 96 % | HEART RATE: 82 BPM | TEMPERATURE: 97.9 F

## 2018-12-12 PROCEDURE — 99214 OFFICE O/P EST MOD 30 MIN: CPT

## 2018-12-12 RX ORDER — ATORVASTATIN CALCIUM 40 MG/1
40 TABLET, FILM COATED ORAL
Qty: 90 | Refills: 3 | Status: DISCONTINUED | COMMUNITY
Start: 2018-04-29 | End: 2018-12-12

## 2018-12-12 NOTE — PLAN
[FreeTextEntry1] : recent ppm\par pmr ok on prednisone 1\par bp good\par memory worse\par no chest pain\par switch to metoprolol succinate 25 one daily

## 2018-12-12 NOTE — PHYSICAL EXAM
[No Acute Distress] : no acute distress [Well Nourished] : well nourished [Normal Outer Ear/Nose] : the outer ears and nose were normal in appearance [Normal Oropharynx] : the oropharynx was normal [No JVD] : no jugular venous distention [Supple] : supple [No Respiratory Distress] : no respiratory distress  [Clear to Auscultation] : lungs were clear to auscultation bilaterally [No Edema] : there was no peripheral edema [de-identified] : afib 84 with murmur

## 2018-12-12 NOTE — HISTORY OF PRESENT ILLNESS
[FreeTextEntry1] : Afib, bp [de-identified] : Afib with recent ppm\par did not tolerate metoprolol 50 tartrate\par memory issue getting worse\par pmr on 1 mg prednisone

## 2018-12-12 NOTE — REVIEW OF SYSTEMS
[Fever] : no fever [Chest Pain] : no chest pain [Orthopena] : no orthopnea [Shortness Of Breath] : no shortness of breath [Wheezing] : no wheezing [Abdominal Pain] : no abdominal pain

## 2018-12-13 LAB
25(OH)D3 SERPL-MCNC: 44 NG/ML
ALBUMIN SERPL ELPH-MCNC: 4.4 G/DL
ALP BLD-CCNC: 104 U/L
ALT SERPL-CCNC: 21 U/L
ANION GAP SERPL CALC-SCNC: 14 MMOL/L
AST SERPL-CCNC: 35 U/L
BASOPHILS # BLD AUTO: 0.03 K/UL
BASOPHILS NFR BLD AUTO: 0.4 %
BILIRUB SERPL-MCNC: 0.3 MG/DL
BUN SERPL-MCNC: 36 MG/DL
CALCIUM SERPL-MCNC: 10.9 MG/DL
CHLORIDE SERPL-SCNC: 106 MMOL/L
CHOLEST SERPL-MCNC: 167 MG/DL
CHOLEST/HDLC SERPL: 1.9 RATIO
CO2 SERPL-SCNC: 24 MMOL/L
CREAT SERPL-MCNC: 1.04 MG/DL
EOSINOPHIL # BLD AUTO: 0.45 K/UL
EOSINOPHIL NFR BLD AUTO: 6.6 %
GLUCOSE SERPL-MCNC: 94 MG/DL
HBA1C MFR BLD HPLC: 5.8 %
HCT VFR BLD CALC: 36.5 %
HDLC SERPL-MCNC: 86 MG/DL
HGB BLD-MCNC: 11.4 G/DL
IMM GRANULOCYTES NFR BLD AUTO: 0.3 %
LDLC SERPL CALC-MCNC: 73 MG/DL
LYMPHOCYTES # BLD AUTO: 1.04 K/UL
LYMPHOCYTES NFR BLD AUTO: 15.2 %
MAN DIFF?: NORMAL
MCHC RBC-ENTMCNC: 31.2 GM/DL
MCHC RBC-ENTMCNC: 31.9 PG
MCV RBC AUTO: 102.2 FL
MONOCYTES # BLD AUTO: 0.82 K/UL
MONOCYTES NFR BLD AUTO: 12 %
NEUTROPHILS # BLD AUTO: 4.5 K/UL
NEUTROPHILS NFR BLD AUTO: 65.5 %
PLATELET # BLD AUTO: 206 K/UL
POTASSIUM SERPL-SCNC: 5.4 MMOL/L
PROT SERPL-MCNC: 7.5 G/DL
RBC # BLD: 3.57 M/UL
RBC # FLD: 14.1 %
SODIUM SERPL-SCNC: 144 MMOL/L
T4 SERPL-MCNC: 7.1 UG/DL
TRIGL SERPL-MCNC: 42 MG/DL
TSH SERPL-ACNC: 1.9 UIU/ML
WBC # FLD AUTO: 6.86 K/UL

## 2019-04-17 ENCOUNTER — APPOINTMENT (OUTPATIENT)
Dept: INTERNAL MEDICINE | Facility: CLINIC | Age: 84
End: 2019-04-17
Payer: MEDICARE

## 2019-04-17 VITALS
BODY MASS INDEX: 24.11 KG/M2 | WEIGHT: 150 LBS | SYSTOLIC BLOOD PRESSURE: 116 MMHG | HEIGHT: 66 IN | DIASTOLIC BLOOD PRESSURE: 61 MMHG | TEMPERATURE: 98.4 F | OXYGEN SATURATION: 96 % | HEART RATE: 83 BPM

## 2019-04-17 PROCEDURE — 36415 COLL VENOUS BLD VENIPUNCTURE: CPT

## 2019-04-17 PROCEDURE — 99214 OFFICE O/P EST MOD 30 MIN: CPT | Mod: 25

## 2019-04-17 PROCEDURE — G0439: CPT

## 2019-04-17 NOTE — HEALTH RISK ASSESSMENT
[Fair] :  ~his/her~ mood as fair [One fall no injury in past year] : Patient reported one fall in the past year without injury [1] : 1) Little interest or pleasure doing things for several days (1) [Learning/Retaining New Information] : difficulty learning/retaining new information [Handling Complex Tasks] : difficulty handling complex tasks [Reasoning] : difficulty with reasoning [None] : None [With Family] : lives with family [] :  [Employed] : employed [College] : College [Fully functional (bathing, dressing, toileting, transferring, walking, feeding)] : Fully functional (bathing, dressing, toileting, transferring, walking, feeding) [Feels Safe at Home] : Feels safe at home [Fully functional (using the telephone, shopping, preparing meals, housekeeping, doing laundry, using] : Fully functional and needs no help or supervision to perform IADLs (using the telephone, shopping, preparing meals, housekeeping, doing laundry, using transportation, managing medications and managing finances) [Smoke Detector] : smoke detector [Carbon Monoxide Detector] : carbon monoxide detector [] : No [LPV2Lshth] : 0 [Change in mental status noted] : No change in mental status noted [Language] : denies difficulty with language [Behavior] : denies difficulty with behavior [Spatial Ability and Orientation] : denies difficulty with spatial ability and orientation [Reports changes in vision] : Reports no changes in vision [Reports changes in hearing] : Reports no changes in hearing [Reports changes in dental health] : Reports no changes in dental health [Guns at Home] : no guns at home

## 2019-04-17 NOTE — HISTORY OF PRESENT ILLNESS
[FreeTextEntry1] : Annual exam [de-identified] : Annual exam\par decline vaccines\par aged out of other health maintenance\par \par \par memory declining\par walking with difficulty\par no chest pain\par on med for memory, \par afib on eliques and metoprolol

## 2019-04-17 NOTE — PLAN
[FreeTextEntry1] : Annual exam\par decline vaccine\par aged out of other health mainteance\par \par \par afib rate and bp controlled\par aortic stenosis by exam\par memory much worse\par cholesterol on med\par full blood\par \par trial of off statin for 1 month\par

## 2019-04-17 NOTE — PHYSICAL EXAM
[No Acute Distress] : no acute distress [Well Nourished] : well nourished [Normal Oropharynx] : the oropharynx was normal [Normal Outer Ear/Nose] : the outer ears and nose were normal in appearance [Supple] : supple [No JVD] : no jugular venous distention [No Respiratory Distress] : no respiratory distress  [Clear to Auscultation] : lungs were clear to auscultation bilaterally [No Edema] : there was no peripheral edema [No Extremity Clubbing/Cyanosis] : no extremity clubbing/cyanosis [No HSM] : no HSM [Normal Bowel Sounds] : normal bowel sounds [de-identified] : afib with loud murmur

## 2019-04-17 NOTE — REVIEW OF SYSTEMS
[Frequency] : frequency [Nocturia] : nocturia [Unsteady Walk] : ataxia [Memory Loss] : memory loss [Fever] : no fever [Earache] : no earache [Chest Pain] : no chest pain [Nasal Discharge] : no nasal discharge [Orthopena] : no orthopnea [Shortness Of Breath] : no shortness of breath [Abdominal Pain] : no abdominal pain [Wheezing] : no wheezing [Vomiting] : no vomiting [Joint Pain] : no joint pain

## 2019-04-21 LAB
25(OH)D3 SERPL-MCNC: 43.2 NG/ML
ALBUMIN SERPL ELPH-MCNC: 4.7 G/DL
ALP BLD-CCNC: 107 U/L
ALT SERPL-CCNC: 25 U/L
ANION GAP SERPL CALC-SCNC: 13 MMOL/L
AST SERPL-CCNC: 41 U/L
BASOPHILS # BLD AUTO: 0.04 K/UL
BASOPHILS NFR BLD AUTO: 0.7 %
BILIRUB SERPL-MCNC: 0.4 MG/DL
BUN SERPL-MCNC: 32 MG/DL
CALCIUM SERPL-MCNC: 10.8 MG/DL
CHLORIDE SERPL-SCNC: 102 MMOL/L
CHOLEST SERPL-MCNC: 157 MG/DL
CHOLEST/HDLC SERPL: 2 RATIO
CO2 SERPL-SCNC: 28 MMOL/L
CREAT SERPL-MCNC: 1.2 MG/DL
EOSINOPHIL # BLD AUTO: 0.57 K/UL
EOSINOPHIL NFR BLD AUTO: 9.4 %
ESTIMATED AVERAGE GLUCOSE: 131 MG/DL
FOLATE SERPL-MCNC: >20 NG/ML
GLUCOSE SERPL-MCNC: 86 MG/DL
HBA1C MFR BLD HPLC: 6.2 %
HCT VFR BLD CALC: 36.5 %
HDLC SERPL-MCNC: 77 MG/DL
HGB BLD-MCNC: 11.6 G/DL
IMM GRANULOCYTES NFR BLD AUTO: 0.3 %
LDLC SERPL CALC-MCNC: 71 MG/DL
LYMPHOCYTES # BLD AUTO: 1.34 K/UL
LYMPHOCYTES NFR BLD AUTO: 22.2 %
MAN DIFF?: NORMAL
MCHC RBC-ENTMCNC: 31.6 PG
MCHC RBC-ENTMCNC: 31.8 GM/DL
MCV RBC AUTO: 99.5 FL
MONOCYTES # BLD AUTO: 0.9 K/UL
MONOCYTES NFR BLD AUTO: 14.9 %
NEUTROPHILS # BLD AUTO: 3.17 K/UL
NEUTROPHILS NFR BLD AUTO: 52.5 %
PLATELET # BLD AUTO: 172 K/UL
POTASSIUM SERPL-SCNC: 4.8 MMOL/L
PROT SERPL-MCNC: 7.3 G/DL
RBC # BLD: 3.67 M/UL
RBC # FLD: 13.9 %
SODIUM SERPL-SCNC: 142 MMOL/L
T4 FREE SERPL-MCNC: 1.2 NG/DL
TRIGL SERPL-MCNC: 45 MG/DL
TSH SERPL-ACNC: 2.76 UIU/ML
VIT B12 SERPL-MCNC: 585 PG/ML
WBC # FLD AUTO: 6.04 K/UL

## 2019-07-14 ENCOUNTER — RX RENEWAL (OUTPATIENT)
Age: 84
End: 2019-07-14

## 2019-07-15 ENCOUNTER — MEDICATION RENEWAL (OUTPATIENT)
Age: 84
End: 2019-07-15

## 2019-08-05 ENCOUNTER — RX RENEWAL (OUTPATIENT)
Age: 84
End: 2019-08-05

## 2019-08-21 ENCOUNTER — APPOINTMENT (OUTPATIENT)
Dept: INTERNAL MEDICINE | Facility: CLINIC | Age: 84
End: 2019-08-21
Payer: MEDICARE

## 2019-08-21 VITALS
TEMPERATURE: 98.4 F | DIASTOLIC BLOOD PRESSURE: 66 MMHG | HEIGHT: 66 IN | OXYGEN SATURATION: 96 % | BODY MASS INDEX: 24.43 KG/M2 | HEART RATE: 74 BPM | WEIGHT: 152 LBS | SYSTOLIC BLOOD PRESSURE: 110 MMHG

## 2019-08-21 PROCEDURE — 99214 OFFICE O/P EST MOD 30 MIN: CPT | Mod: 25

## 2019-08-21 PROCEDURE — 36415 COLL VENOUS BLD VENIPUNCTURE: CPT

## 2019-08-21 NOTE — PHYSICAL EXAM
[No Acute Distress] : no acute distress [Well Nourished] : well nourished [No JVD] : no jugular venous distention [No Lymphadenopathy] : no lymphadenopathy [No Accessory Muscle Use] : no accessory muscle use [No Respiratory Distress] : no respiratory distress  [No Carotid Bruits] : no carotid bruits [Normal Rate] : normal rate  [No Edema] : there was no peripheral edema [Soft] : abdomen soft [No HSM] : no HSM [Coordination Grossly Intact] : coordination grossly intact [No Focal Deficits] : no focal deficits [Speech Grossly Normal] : speech grossly normal [Normal Mood] : the mood was normal [de-identified] : afib [de-identified] : memory poor

## 2019-08-21 NOTE — HISTORY OF PRESENT ILLNESS
[FreeTextEntry1] : cholesterol and memory [de-identified] : memory still poor\par no change with off of statin\par afib on metoprolol and eliques\par

## 2019-08-21 NOTE — REVIEW OF SYSTEMS
[Unsteady Walk] : ataxia [Memory Loss] : memory loss [Fever] : no fever [Earache] : no earache [Nasal Discharge] : no nasal discharge [Shortness Of Breath] : no shortness of breath [Wheezing] : no wheezing [Abdominal Pain] : no abdominal pain [Joint Pain] : no joint pain [Back Pain] : no back pain

## 2019-08-21 NOTE — PLAN
[FreeTextEntry1] : memory no better  resumed statin\par check lipid\par blood pressure good\par check sed rate with hx of pmr\par   increase prednisone back to 2 mg daily\par

## 2019-08-22 LAB
ALBUMIN SERPL ELPH-MCNC: 3.9 G/DL
ALP BLD-CCNC: 103 U/L
ALT SERPL-CCNC: 21 U/L
ANION GAP SERPL CALC-SCNC: 12 MMOL/L
AST SERPL-CCNC: 34 U/L
BASOPHILS # BLD AUTO: 0.04 K/UL
BASOPHILS NFR BLD AUTO: 0.6 %
BILIRUB SERPL-MCNC: 0.3 MG/DL
BUN SERPL-MCNC: 33 MG/DL
CALCIUM SERPL-MCNC: 10.4 MG/DL
CHLORIDE SERPL-SCNC: 103 MMOL/L
CHOLEST SERPL-MCNC: 134 MG/DL
CHOLEST/HDLC SERPL: 2.1 RATIO
CO2 SERPL-SCNC: 26 MMOL/L
CREAT SERPL-MCNC: 1.45 MG/DL
EOSINOPHIL # BLD AUTO: 0.55 K/UL
EOSINOPHIL NFR BLD AUTO: 8.6 %
ERYTHROCYTE [SEDIMENTATION RATE] IN BLOOD BY WESTERGREN METHOD: 37 MM/HR
ESTIMATED AVERAGE GLUCOSE: 117 MG/DL
GLUCOSE SERPL-MCNC: 90 MG/DL
HBA1C MFR BLD HPLC: 5.7 %
HCT VFR BLD CALC: 33.2 %
HDLC SERPL-MCNC: 63 MG/DL
HGB BLD-MCNC: 10.7 G/DL
IMM GRANULOCYTES NFR BLD AUTO: 0.5 %
LDLC SERPL CALC-MCNC: 64 MG/DL
LYMPHOCYTES # BLD AUTO: 1.43 K/UL
LYMPHOCYTES NFR BLD AUTO: 22.4 %
MAN DIFF?: NORMAL
MCHC RBC-ENTMCNC: 32 PG
MCHC RBC-ENTMCNC: 32.2 GM/DL
MCV RBC AUTO: 99.4 FL
MONOCYTES # BLD AUTO: 0.94 K/UL
MONOCYTES NFR BLD AUTO: 14.7 %
NEUTROPHILS # BLD AUTO: 3.4 K/UL
NEUTROPHILS NFR BLD AUTO: 53.2 %
PLATELET # BLD AUTO: 158 K/UL
POTASSIUM SERPL-SCNC: 5.2 MMOL/L
PROT SERPL-MCNC: 6.9 G/DL
RBC # BLD: 3.34 M/UL
RBC # FLD: 14.1 %
SODIUM SERPL-SCNC: 141 MMOL/L
T4 FREE SERPL-MCNC: 1.2 NG/DL
TRIGL SERPL-MCNC: 37 MG/DL
TSH SERPL-ACNC: 3.15 UIU/ML
URATE SERPL-MCNC: 6.4 MG/DL
WBC # FLD AUTO: 6.39 K/UL

## 2019-11-08 ENCOUNTER — EMERGENCY (EMERGENCY)
Facility: HOSPITAL | Age: 84
LOS: 1 days | Discharge: HOME | End: 2019-11-08
Attending: STUDENT IN AN ORGANIZED HEALTH CARE EDUCATION/TRAINING PROGRAM | Admitting: STUDENT IN AN ORGANIZED HEALTH CARE EDUCATION/TRAINING PROGRAM
Payer: MEDICARE

## 2019-11-08 VITALS
SYSTOLIC BLOOD PRESSURE: 170 MMHG | OXYGEN SATURATION: 96 % | HEART RATE: 90 BPM | DIASTOLIC BLOOD PRESSURE: 90 MMHG | RESPIRATION RATE: 18 BRPM | TEMPERATURE: 98 F

## 2019-11-08 DIAGNOSIS — Z95.1 PRESENCE OF AORTOCORONARY BYPASS GRAFT: Chronic | ICD-10-CM

## 2019-11-08 LAB
ALBUMIN SERPL ELPH-MCNC: 4.3 G/DL — SIGNIFICANT CHANGE UP (ref 3.3–5)
ALP SERPL-CCNC: 105 U/L — SIGNIFICANT CHANGE UP (ref 40–120)
ALT FLD-CCNC: 24 U/L — SIGNIFICANT CHANGE UP (ref 4–41)
ANION GAP SERPL CALC-SCNC: 10 MMO/L — SIGNIFICANT CHANGE UP (ref 7–14)
APTT BLD: 26.3 SEC — LOW (ref 27.5–36.3)
AST SERPL-CCNC: 46 U/L — HIGH (ref 4–40)
BASOPHILS # BLD AUTO: 0.04 K/UL — SIGNIFICANT CHANGE UP (ref 0–0.2)
BASOPHILS NFR BLD AUTO: 0.5 % — SIGNIFICANT CHANGE UP (ref 0–2)
BILIRUB SERPL-MCNC: 0.3 MG/DL — SIGNIFICANT CHANGE UP (ref 0.2–1.2)
BUN SERPL-MCNC: 31 MG/DL — HIGH (ref 7–23)
CALCIUM SERPL-MCNC: 10.5 MG/DL — SIGNIFICANT CHANGE UP (ref 8.4–10.5)
CHLORIDE SERPL-SCNC: 99 MMOL/L — SIGNIFICANT CHANGE UP (ref 98–107)
CO2 SERPL-SCNC: 26 MMOL/L — SIGNIFICANT CHANGE UP (ref 22–31)
CREAT SERPL-MCNC: 1.04 MG/DL — SIGNIFICANT CHANGE UP (ref 0.5–1.3)
EOSINOPHIL # BLD AUTO: 0.24 K/UL — SIGNIFICANT CHANGE UP (ref 0–0.5)
EOSINOPHIL NFR BLD AUTO: 3.2 % — SIGNIFICANT CHANGE UP (ref 0–6)
GLUCOSE SERPL-MCNC: 101 MG/DL — HIGH (ref 70–99)
HCT VFR BLD CALC: 40 % — SIGNIFICANT CHANGE UP (ref 39–50)
HGB BLD-MCNC: 12.7 G/DL — LOW (ref 13–17)
IMM GRANULOCYTES NFR BLD AUTO: 0.4 % — SIGNIFICANT CHANGE UP (ref 0–1.5)
INR BLD: 1.15 — SIGNIFICANT CHANGE UP (ref 0.88–1.17)
LYMPHOCYTES # BLD AUTO: 0.78 K/UL — LOW (ref 1–3.3)
LYMPHOCYTES # BLD AUTO: 10.4 % — LOW (ref 13–44)
MCHC RBC-ENTMCNC: 31.8 % — LOW (ref 32–36)
MCHC RBC-ENTMCNC: 31.8 PG — SIGNIFICANT CHANGE UP (ref 27–34)
MCV RBC AUTO: 100 FL — SIGNIFICANT CHANGE UP (ref 80–100)
MONOCYTES # BLD AUTO: 0.73 K/UL — SIGNIFICANT CHANGE UP (ref 0–0.9)
MONOCYTES NFR BLD AUTO: 9.7 % — SIGNIFICANT CHANGE UP (ref 2–14)
NEUTROPHILS # BLD AUTO: 5.67 K/UL — SIGNIFICANT CHANGE UP (ref 1.8–7.4)
NEUTROPHILS NFR BLD AUTO: 75.8 % — SIGNIFICANT CHANGE UP (ref 43–77)
NRBC # FLD: 0 K/UL — SIGNIFICANT CHANGE UP (ref 0–0)
PLATELET # BLD AUTO: 160 K/UL — SIGNIFICANT CHANGE UP (ref 150–400)
PMV BLD: 9.7 FL — SIGNIFICANT CHANGE UP (ref 7–13)
POTASSIUM SERPL-MCNC: 5.1 MMOL/L — SIGNIFICANT CHANGE UP (ref 3.5–5.3)
POTASSIUM SERPL-SCNC: 5.1 MMOL/L — SIGNIFICANT CHANGE UP (ref 3.5–5.3)
PROT SERPL-MCNC: 7.8 G/DL — SIGNIFICANT CHANGE UP (ref 6–8.3)
PROTHROM AB SERPL-ACNC: 12.8 SEC — SIGNIFICANT CHANGE UP (ref 9.8–13.1)
RBC # BLD: 4 M/UL — LOW (ref 4.2–5.8)
RBC # FLD: 13.9 % — SIGNIFICANT CHANGE UP (ref 10.3–14.5)
SODIUM SERPL-SCNC: 135 MMOL/L — SIGNIFICANT CHANGE UP (ref 135–145)
WBC # BLD: 7.49 K/UL — SIGNIFICANT CHANGE UP (ref 3.8–10.5)
WBC # FLD AUTO: 7.49 K/UL — SIGNIFICANT CHANGE UP (ref 3.8–10.5)

## 2019-11-08 PROCEDURE — 70450 CT HEAD/BRAIN W/O DYE: CPT | Mod: 26

## 2019-11-08 PROCEDURE — 93010 ELECTROCARDIOGRAM REPORT: CPT | Mod: XU

## 2019-11-08 PROCEDURE — 72125 CT NECK SPINE W/O DYE: CPT | Mod: 26

## 2019-11-08 PROCEDURE — 71045 X-RAY EXAM CHEST 1 VIEW: CPT | Mod: 26

## 2019-11-08 PROCEDURE — 72170 X-RAY EXAM OF PELVIS: CPT | Mod: 26

## 2019-11-08 PROCEDURE — 99284 EMERGENCY DEPT VISIT MOD MDM: CPT | Mod: 25,GC

## 2019-11-08 PROCEDURE — 12002 RPR S/N/AX/GEN/TRNK2.6-7.5CM: CPT | Mod: GC

## 2019-11-08 RX ORDER — ACETAMINOPHEN 500 MG
650 TABLET ORAL ONCE
Refills: 0 | Status: COMPLETED | OUTPATIENT
Start: 2019-11-08 | End: 2019-11-08

## 2019-11-08 RX ORDER — SODIUM CHLORIDE 9 MG/ML
500 INJECTION INTRAMUSCULAR; INTRAVENOUS; SUBCUTANEOUS ONCE
Refills: 0 | Status: COMPLETED | OUTPATIENT
Start: 2019-11-08 | End: 2019-11-08

## 2019-11-08 RX ORDER — TETANUS TOXOID, REDUCED DIPHTHERIA TOXOID AND ACELLULAR PERTUSSIS VACCINE, ADSORBED 5; 2.5; 8; 8; 2.5 [IU]/.5ML; [IU]/.5ML; UG/.5ML; UG/.5ML; UG/.5ML
0.5 SUSPENSION INTRAMUSCULAR ONCE
Refills: 0 | Status: COMPLETED | OUTPATIENT
Start: 2019-11-08 | End: 2019-11-08

## 2019-11-08 RX ADMIN — TETANUS TOXOID, REDUCED DIPHTHERIA TOXOID AND ACELLULAR PERTUSSIS VACCINE, ADSORBED 0.5 MILLILITER(S): 5; 2.5; 8; 8; 2.5 SUSPENSION INTRAMUSCULAR at 12:39

## 2019-11-08 RX ADMIN — SODIUM CHLORIDE 500 MILLILITER(S): 9 INJECTION INTRAMUSCULAR; INTRAVENOUS; SUBCUTANEOUS at 12:36

## 2019-11-08 NOTE — ED PROVIDER NOTE - PHYSICAL EXAMINATION
General: A&Ox3, well nourished, no acute distress  HENT:  2 cm laceration on the occiput of his head. Posterior oropharynx clear. Patent airway  Eyes: PERRL, EOMI  CV: RRR, no m/r/g. 2+ peripheral pulses. Extremities are warm and well perfused.  Respiratory: CTAB no w/r/r. No respiratory distress.  Abdominal: soft, non-distended, non-tender, no rebound, guarding, or rigidity  Neuro: No focal deficits  Skin: no rashes.  Psych: normal mood and affect

## 2019-11-08 NOTE — ED ADULT NURSE NOTE - OBJECTIVE STATEMENT
Pt presents to  4 A&Ox4 with head laceration. Pt able to drive to [address] [place] [address] rd, mistook that for [facility] ED, EMS brought here. As per patient, was in bathroom slipped and fell and hit head on sink countertop. Pt laceration 2in in length front to back on top of head, still bleeding. Pt with bandage in place from EMS, changed and new one in place. Pt on eliquous for Afib. 20g IV placed on R forearm, labs drawn and sent. Pt in no obvious distress, denying any pain. Will continue to monitor.

## 2019-11-08 NOTE — ED PROVIDER NOTE - PROGRESS NOTE DETAILS
Jonathan Weil, PGY3 - imaging negative. Wound hemostatic. Pt stable and at neuro baseline. Wife here to take him home.

## 2019-11-08 NOTE — ED PROVIDER NOTE - CLINICAL SUMMARY MEDICAL DECISION MAKING FREE TEXT BOX
Jonathan Weil, PGY3 - CT head and neck to r/o serious traumatic injury given age and DOAC use. No signs of trauma elsewhere. Scalp lac still bleeding after applying direct pressure. Will staple and reassess.

## 2019-11-08 NOTE — ED ADULT NURSE NOTE - CHPI ED NUR SYMPTOMS NEG
no nausea/no fever/no tingling/no chills/no dizziness/no pain/no weakness/no vomiting/no decreased eating/drinking

## 2019-11-08 NOTE — ED ADULT NURSE NOTE - CHIEF COMPLAINT QUOTE
Pt brought to ER by EMS from [address] [place] [address]. He slipped and fell, hit his head on sink at home. He was driving himself to hospital and turned into [place] instead of [facility]. He does not remember the whole incident. pt has a large gash to top of his head. Pt on Eliquis and has Afib.

## 2019-11-08 NOTE — ED PROVIDER NOTE - PATIENT PORTAL LINK FT
You can access the FollowMyHealth Patient Portal offered by [facility] by registering at the following website: [email or web address]. By joining [employer]’s FollowMyHealth portal, you will also be able to view your health information using other applications (apps) compatible with our system.

## 2019-11-08 NOTE — ED PROVIDER NOTE - CARE PLAN
Principal Discharge DX:	Scalp laceration, initial encounter  Secondary Diagnosis:	Contusion of scalp, initial encounter

## 2019-11-08 NOTE — ED ADULT NURSE REASSESSMENT NOTE - NS ED NURSE REASSESS COMMENT FT1
covering primary RN for break pt is in bed A and O X 3 in NAD, PERRLA extremities are strong and equal, pt denies HA, denies N/V fever r chills, pending  CT results at this time, personal care assisted with, needs attended to

## 2019-11-08 NOTE — ED PROVIDER NOTE - ATTENDING CONTRIBUTION TO CARE
Amada Real M.D: 89M hx afib on eliquis, dementia, htn, bph, BIBEMS for unwitnessed fall with closed head injury. pt states he was going to the bathroom and he got up and slipped and fell forward and hit his head. was able to get himself up after a minute and walked to his car and drove to [facility] and called an ambulance. no loc. only complaining of pain to top of his head where laceration is rpesent. no cp sob dizziness lightheadedness before or after fall. no abd pain no n/v. unknopwn last tetanus on exam pt is calm and in no distress:   patient awake alert NAD .   LUNGS CTAB no wheeze no crackle.   CARD irregularly irregular no m/r/g.    Abdomen soft NT ND no rebound no guarding no CVA tenderness.   EXT WWP no edema no calf tenderness CV 2+DP/PT bilaterally.   neuro Awake, alert, moving all extremities, cn2-12 intact, gross motor and sensory intact in all extremities.  skin warm and dry. complex v shaped laceration to the crown of head, actively oozing.  HEENT: dry mucous membranes, PERRL, EOMI     unclear if pt is reliable historian or not, and while likely mechanical fall from description may be syncopal episode instead. pt currently asymptomatic. EKG demonstrates AFib with inferior TWIs, which is identical to prior EKGs. no signs of qt prolongation, brugada, AV block to suggest cardiac cause of symptoms. given no cp no sob and no change in ekg no indication for troponin in setting of ?syncope. plan for basic labs to evaluate for anemia, lyte disturbance, coags given oneliquis. will evaluate for any sequelae of trauma with cxr, pelvis xr, ct head, ct c-spine, update tetanus, lac repair.   will attempt to get collateral from wife/

## 2019-11-08 NOTE — ED PROVIDER NOTE - NSFOLLOWUPINSTRUCTIONS_ED_ALL_ED_FT
You can take acetaminophen (aka tylenol, 1000 mg every 6-8 hours) for pain. You can also take ibuprofen (600 mg every 6-8 hours) in addition if tylenol alone does not improve the pain. Do not exceed 4000 mg acetaminophen (tylenol) in a 24 hour period. Be aware if any of your other medications contain acetaminophen so as not to exceed the maximum daily dose.    Follow up with your primary physician in 2-3 days. If needed call [phone] to find a primary care physician or call  [phone] to schedule an appointment with the general medicine.     Return to the ER for worsening pain, confusion, or any other new concerning symptoms.

## 2019-11-08 NOTE — ED PROVIDER NOTE - OBJECTIVE STATEMENT
Jonathan Weil, PGY3 - Jonathan Weil, PGY3 - 89M BIBA after he stood up from using the toilet and suffered a mechanical fall, tripping and hitting the top of his head on the sink. No LOC, denies preceding HA/chest pain/dyspnea/light headedness/dizziness. EMS dressed a laceration on his scalp. Last tetanus booster unknown.

## 2019-11-08 NOTE — ED ADULT TRIAGE NOTE - CHIEF COMPLAINT QUOTE
Pt brought to ER by EMS from [address] [place] [address]. He slipped and fell, hit his head on sink at home. He was driving himself to hospital and turned into [place] instead of [facility]. He does not remember the whole incident. pt has a large gash to top of his head. Pt brought to ER by EMS from [address] [place] [address]. He slipped and fell, hit his head on sink at home. He was driving himself to hospital and turned into [place] instead of [facility]. He does not remember the whole incident. pt has a large gash to top of his head. Pt on Eliquis and has Afib.

## 2019-11-09 ENCOUNTER — MOBILE ON CALL (OUTPATIENT)
Age: 84
End: 2019-11-09

## 2019-11-14 ENCOUNTER — EMERGENCY (EMERGENCY)
Facility: HOSPITAL | Age: 84
LOS: 1 days | Discharge: HOME | End: 2019-11-14
Attending: EMERGENCY MEDICINE | Admitting: EMERGENCY MEDICINE

## 2019-11-14 VITALS
RESPIRATION RATE: 16 BRPM | DIASTOLIC BLOOD PRESSURE: 74 MMHG | SYSTOLIC BLOOD PRESSURE: 106 MMHG | TEMPERATURE: 97 F | OXYGEN SATURATION: 98 % | HEART RATE: 84 BPM

## 2019-11-14 DIAGNOSIS — Z95.1 PRESENCE OF AORTOCORONARY BYPASS GRAFT: Chronic | ICD-10-CM

## 2019-11-14 NOTE — ED PROVIDER NOTE - OBJECTIVE STATEMENT
89M presents for staple removal.  Sustained scalp laceration after mechanical fall 11/8/19.  Denies any acute complaints.

## 2019-11-14 NOTE — ED ADULT TRIAGE NOTE - BP NONINVASIVE SYSTOLIC (MM HG)
Physical Therapy Discharge Summary    Referred by: Valentino Kumar MD  Medical Diagnosis (from order):  Rupture of anterior cruciate ligament of right knee, subsequent encounter [F25.964W]     Current Functional Limitations: Squatting down for long periods of time, maintaining a low sitting position for longer periods of time.     OBJECTIVE   remeasurements as noted in attached daily treatment note    Outcome Measure: (Outcome Scoring) Lower Extremity Functional Scale   Initial Outcome Score: 9  Discharge Outcome Score: 68    ASSESSMENT   Nichole has made amazing progress following her ACLR.  She is confident in her HEP and is prepared to return to work.     To date the patient has made gains as expected as reported.   Discharge from skilled therapy with instructions/recommendations: continue HEP    PLAN    Discharge from therapy    Goals  To be obtained by end of this plan of care:  1. Patient will be independent with progressed and modified home exercise program MET  2. Decrease pain/symptoms to 2/10 MET  3. Improve involved strength to 4+/5 MET  4. Improve involved range of motion to 0° - 110° MET  through improvements listed above patient will:  5. Be able to ambulate community distances on even and uneven terrain without assistive device MET  6. Be able to ascend and descend 1 flight of stairs using reciprocal pattern with minimal pain/difficulty MET  7. Be able to complete independent transfers with minimal pain/difficulty MET  8. Be able to stand for 60 minutes with minimal pain/difficulty to improve activities of independent daily living, activity tolerance, age appropriate activities,  activities, completion of household tasks MET  9. Be able to bend/squat with minimal pain/difficulty to improve  activities, completion of household tasks, grocery shopping, house cleaning and care MET  10. Lower Extremity Functional Scale: Patient will complete form to reflect an improved score from  initial score of 9 to greater than or equal to 30 (0=extreme difficulty; 80=no difficulty) to indicate pt reported improvement in function/disability/impairment (minimal detectable change: 9 points). MET    Discharge Measures:   Total Number of Visits: 31  Treatment Category: Knee, Other Surgical  Outcome Measure: above  Primary Clinician: Charlie Bashir    Physical Therapy Daily Treatment    Visit Count: 30  Plan of Care: 11/15/2018 Through: 3/7/2019  Referred by: Valentino Kumar MD   Date of Surgery: 11/9/18; Surgery performed: Arthroscopy with ACL reconstruction - Allograft; Physician Guidelines yes, protocol in chart  Insurance Information: Pillow 60 max plan visit, 48 remaining  Precautions: no forced/active assisted flexion, no abduction until normal VMO function, patellar mobilizations and scar mobilizations pending good healing, edema and pain management    SUBJECTIVE   Patient reports that she did her exercises over the weekend and tried to work on her hamstring muscle herself yesterday, but it didn't help as much as she know it could have.      Current Pain (0-10 scale): tightness in semi-membranosis     Functional Change: 90% reported improvement    OBJECTIVE   Range of Motion (degrees)    Right Left Right   Date Initial Initial 3/5/19   Knee Flexion (135) 72  140 AROM   Knee Extension (0-5) 9 AA 0 0 resting and with quad set   Reported in degrees, active range of motion recorded unless noted as AA=active assistive or P=passive; standard testing positions unless otherwise noted, norms included in ( ); *=pain      Strength (out of 5)    Right Left Right   Date Initial Initial 3/5/19   Hip Flexors 4- 5 5   Hip Extensors   5   Hip Abductors   5   Hip Internal Rotators   5   Hip External Rotators   5   Knee Flexors 4 5 5   Knee Extensors <3 5 5   Ankle Dorsiflexors 5 5 5   Ankle Plantar Flexors 5 5 5   standard testing positions unless otherwise noted; *=pain    Treatment   Therapeutic Exercise:    Elliptical, level 3, for 4 minutes  Curtsey lunge with hip ABD kick 10X B  Squat jumps 2 sets of 10X   Side stepping with red TB around feet 20X each direction  Monster walks with red TB around feet 20X each direction  Wall sits 15 second hold 4X  Prone hamstring curl with green TB 10X on R  Sidelying hip adduction 10X B  Supine active hamstring stretch 5X 10 second hold on R  Long sitting belt-assisted gastroc stretch 4X 15 seconds on R  Supine hip flexor stretch 1 minute B    Manual Therapy:   None today.     Neuromuscular Re-education:  None today.     Therapeutic Activity:  Finalized HEP.      Skilled input: Provided tactile cuing for proper muscle contraction, posture correct to perform exercise correctly, modification to home program due to pain response    Home Program:   Access Code: [email or web address]   URL: [email or web address]   Date: 03/07/2019   Prepared by: Charlie Bashir     Program Notes   do at least 5 days a week through [facility].     Exercises  Walk up Stairs with Assist - 3 reps - 1x daily  Crossover Lunge - 10 reps - 1x daily  Squat Jumps - 10 reps - 2 sets - 1x daily  Side Stepping with Resistance at Feet - 20 reps - 1x daily  Forward Monster Walks - 20 reps - 1x daily  Backward Monster Walks - 20 reps - 1x daily  Wall Sit - 4 reps - 15 seconds hold - 1x daily  Prone Hamstring Curl with Anchored Resistance - 10 reps - 1x daily  Sidelying Hip Adduction - 10 reps - 1x daily  Supine Hamstring Stretch - 5 reps - 10 seconds hold - 1x daily  Long Sitting Calf Stretch with Strap - 4 reps - 15 second hold - 1x daily  Modified Rudolph Stretch - 1 reps - 60 seconds hold - 1x daily    Writer verbally educated the patient and received verbal consent from the patient on hand placement, positioning of patient, and techniques to be performed today including above and how they are pertinent to the patient's plan of care.      ASSESSMENT   See above.     Pain after treatment (patient reported, 0-10  scale): 0    Result of above outlined education: Verbalizes understanding, Demonstrates understanding and Needs reinforcement    THERAPY DAILY BILLING   Insurance: ANTHEM/BCBS 2. N/A    Evaluation Procedures:  No evaluation codes were used on this date of service    Timed Procedures:  Therapeutic Activity, 8 minutes  Therapeutic Exercise, 30 minutes    Untimed Procedures:  No untimed codes were used on this date of service    Total Treatment Time: 38 minutes   106

## 2019-11-14 NOTE — ED PROVIDER NOTE - PATIENT PORTAL LINK FT
You can access the FollowMyHealth Patient Portal offered by [facility] by registering at the following website: [email or web address]. By joining [employer]’s FollowMyHealth portal, you will also be able to view your health information using other applications (apps) compatible with our system.

## 2019-11-27 ENCOUNTER — INPATIENT (INPATIENT)
Facility: HOSPITAL | Age: 84
LOS: 2 days | Discharge: NURSING, REHAB OR LONG-TERM CARE | DRG: 964 | End: 2019-11-30
Attending: STUDENT IN AN ORGANIZED HEALTH CARE EDUCATION/TRAINING PROGRAM | Admitting: STUDENT IN AN ORGANIZED HEALTH CARE EDUCATION/TRAINING PROGRAM
Payer: MEDICARE

## 2019-11-27 VITALS
WEIGHT: 149.91 LBS | OXYGEN SATURATION: 99 % | RESPIRATION RATE: 20 BRPM | HEIGHT: 66 IN | HEART RATE: 78 BPM | SYSTOLIC BLOOD PRESSURE: 152 MMHG | TEMPERATURE: 98 F | DIASTOLIC BLOOD PRESSURE: 78 MMHG

## 2019-11-27 DIAGNOSIS — M25.551 PAIN IN RIGHT HIP: ICD-10-CM

## 2019-11-27 DIAGNOSIS — Z95.1 PRESENCE OF AORTOCORONARY BYPASS GRAFT: Chronic | ICD-10-CM

## 2019-11-27 LAB
ALBUMIN SERPL ELPH-MCNC: 4.1 G/DL — SIGNIFICANT CHANGE UP (ref 3.3–5)
ALP SERPL-CCNC: 86 U/L — SIGNIFICANT CHANGE UP (ref 40–120)
ALT FLD-CCNC: 20 U/L — SIGNIFICANT CHANGE UP (ref 10–45)
ANION GAP SERPL CALC-SCNC: 11 MMOL/L — SIGNIFICANT CHANGE UP (ref 5–17)
APTT BLD: 23.4 SEC — LOW (ref 27.5–36.3)
AST SERPL-CCNC: 37 U/L — SIGNIFICANT CHANGE UP (ref 10–40)
BASOPHILS # BLD AUTO: 0.02 K/UL — SIGNIFICANT CHANGE UP (ref 0–0.2)
BASOPHILS NFR BLD AUTO: 0.2 % — SIGNIFICANT CHANGE UP (ref 0–2)
BILIRUB SERPL-MCNC: 0.3 MG/DL — SIGNIFICANT CHANGE UP (ref 0.2–1.2)
BUN SERPL-MCNC: 34 MG/DL — HIGH (ref 7–23)
CALCIUM SERPL-MCNC: 10.7 MG/DL — HIGH (ref 8.4–10.5)
CHLORIDE SERPL-SCNC: 105 MMOL/L — SIGNIFICANT CHANGE UP (ref 96–108)
CO2 SERPL-SCNC: 24 MMOL/L — SIGNIFICANT CHANGE UP (ref 22–31)
CREAT SERPL-MCNC: 1.25 MG/DL — SIGNIFICANT CHANGE UP (ref 0.5–1.3)
EOSINOPHIL # BLD AUTO: 0.22 K/UL — SIGNIFICANT CHANGE UP (ref 0–0.5)
EOSINOPHIL NFR BLD AUTO: 2.7 % — SIGNIFICANT CHANGE UP (ref 0–6)
GLUCOSE SERPL-MCNC: 104 MG/DL — HIGH (ref 70–99)
HCT VFR BLD CALC: 32.6 % — LOW (ref 39–50)
HGB BLD-MCNC: 10.5 G/DL — LOW (ref 13–17)
IMM GRANULOCYTES NFR BLD AUTO: 0.5 % — SIGNIFICANT CHANGE UP (ref 0–1.5)
INR BLD: 1.12 RATIO — SIGNIFICANT CHANGE UP (ref 0.88–1.16)
LYMPHOCYTES # BLD AUTO: 1.04 K/UL — SIGNIFICANT CHANGE UP (ref 1–3.3)
LYMPHOCYTES # BLD AUTO: 12.9 % — LOW (ref 13–44)
MCHC RBC-ENTMCNC: 32.2 GM/DL — SIGNIFICANT CHANGE UP (ref 32–36)
MCHC RBC-ENTMCNC: 32.3 PG — SIGNIFICANT CHANGE UP (ref 27–34)
MCV RBC AUTO: 100.3 FL — HIGH (ref 80–100)
MONOCYTES # BLD AUTO: 1 K/UL — HIGH (ref 0–0.9)
MONOCYTES NFR BLD AUTO: 12.4 % — SIGNIFICANT CHANGE UP (ref 2–14)
NEUTROPHILS # BLD AUTO: 5.75 K/UL — SIGNIFICANT CHANGE UP (ref 1.8–7.4)
NEUTROPHILS NFR BLD AUTO: 71.3 % — SIGNIFICANT CHANGE UP (ref 43–77)
NRBC # BLD: 0 /100 WBCS — SIGNIFICANT CHANGE UP (ref 0–0)
PLATELET # BLD AUTO: 138 K/UL — LOW (ref 150–400)
POTASSIUM SERPL-MCNC: 5.1 MMOL/L — SIGNIFICANT CHANGE UP (ref 3.5–5.3)
POTASSIUM SERPL-SCNC: 5.1 MMOL/L — SIGNIFICANT CHANGE UP (ref 3.5–5.3)
PROT SERPL-MCNC: 7 G/DL — SIGNIFICANT CHANGE UP (ref 6–8.3)
PROTHROM AB SERPL-ACNC: 12.8 SEC — SIGNIFICANT CHANGE UP (ref 10–12.9)
RBC # BLD: 3.25 M/UL — LOW (ref 4.2–5.8)
RBC # FLD: 14.1 % — SIGNIFICANT CHANGE UP (ref 10.3–14.5)
SODIUM SERPL-SCNC: 140 MMOL/L — SIGNIFICANT CHANGE UP (ref 135–145)
WBC # BLD: 8.07 K/UL — SIGNIFICANT CHANGE UP (ref 3.8–10.5)
WBC # FLD AUTO: 8.07 K/UL — SIGNIFICANT CHANGE UP (ref 3.8–10.5)

## 2019-11-27 PROCEDURE — 73502 X-RAY EXAM HIP UNI 2-3 VIEWS: CPT | Mod: 26,RT

## 2019-11-27 PROCEDURE — 76377 3D RENDER W/INTRP POSTPROCES: CPT | Mod: 26

## 2019-11-27 PROCEDURE — 73552 X-RAY EXAM OF FEMUR 2/>: CPT | Mod: 26,RT

## 2019-11-27 PROCEDURE — 71045 X-RAY EXAM CHEST 1 VIEW: CPT | Mod: 26

## 2019-11-27 PROCEDURE — 93010 ELECTROCARDIOGRAM REPORT: CPT

## 2019-11-27 PROCEDURE — 99285 EMERGENCY DEPT VISIT HI MDM: CPT

## 2019-11-27 PROCEDURE — 72192 CT PELVIS W/O DYE: CPT | Mod: 26

## 2019-11-27 RX ORDER — ACETAMINOPHEN 500 MG
975 TABLET ORAL ONCE
Refills: 0 | Status: COMPLETED | OUTPATIENT
Start: 2019-11-27 | End: 2019-11-27

## 2019-11-27 RX ADMIN — Medication 975 MILLIGRAM(S): at 20:12

## 2019-11-27 NOTE — ED ADULT NURSE NOTE - CHPI ED NUR SYMPTOMS NEG
no bleeding/no abrasion/no deformity/no fever/no tingling/no vomiting/no weakness/no loss of consciousness/no numbness/no confusion

## 2019-11-27 NOTE — ED PROVIDER NOTE - ATTENDING CONTRIBUTION TO CARE
89 YOM PMHX Afib, CABG, BPH, HTN BIBA had mechanical fall while walking up stairs. Landed on to his right buttocks. Did not have head injury or LOC. Unable to walk after with right groin pain/right hip pain. today.  No LOC, denies preceding HA/chest pain/dyspnea/light headedness/dizziness.  AP - right leg minimally shortened, unable to range. will get imaging to evaluate for fracture. pain control

## 2019-11-27 NOTE — CONSULT NOTE ADULT - SUBJECTIVE AND OBJECTIVE BOX
CC: R side pain  HPI: 89yMale w/ hx of Afib on eliquis, BPH, and HTN who presents s/p fall directly on R side down 3 steps. Patient says he was walking up steps when he lost balance and fell on his R side, landing directly onto the bony part of the R side of his pelvis, which he points to. Unable to ambulate afterwards. Denies numbness/tingling. Patient lives at home with his wife. Ambulates with assistive devices at baseline.    Review of systems: As per HPI, otherwise negative.     PAST MEDICAL & SURGICAL HISTORY:  HTN (hypertension)  BPH (benign prostatic hyperplasia)  Atrial fibrillation  S/P CABG x 5    T(C): 36.8 (11-27-19 @ 23:01), Max: 36.8 (11-27-19 @ 19:19)  HR: 92 (11-27-19 @ 23:01) (78 - 92)  BP: 139/82 (11-27-19 @ 23:01) (139/82 - 162/79)  RR: 18 (11-27-19 @ 23:01) (16 - 20)  SpO2: 98% (11-27-19 @ 23:01) (98% - 100%)                        10.5   8.07  )-----------( 138      ( 27 Nov 2019 19:58 )             32.6     11-27    140  |  105  |  34<H>  ----------------------------<  104<H>  5.1   |  24  |  1.25    Ca    10.7<H>      27 Nov 2019 19:58    TPro  7.0  /  Alb  4.1  /  TBili  0.3  /  DBili  x   /  AST  37  /  ALT  20  /  AlkPhos  86  11-27    EXAM:  Awake, Alert and in no acute distress  RLE:  swelling/ecchymosis over the ASIS  TTP iliac crest  negative heel strike, log roll  SILT throughout  Fires TA/EHL/GS  WWP distally    Imaging  XR and CT scans of the pelvis shows an isolated R iliac wing fracture, the posterior ring of the pelvis is intact and stable    A/P: This is a 89y Male who presents today with isolated R iliac wing fracture s/p direct trauma to the R side of the pelvis. Given that the posterior ring of the pelvis is intact, this fracture is inherently stable.    - WBAT RLE  - PT/OT/OOB  - No further orthopaedic intervention necessary    Patient discussed with Dr. Lopez who agrees with plan    Luke Mitchell MD

## 2019-11-27 NOTE — ED PROVIDER NOTE - OBJECTIVE STATEMENT
89 year old male PMHX Afib, BPH, HTN BIBA after he fell landing on his lower right side while walking up his stairs. States he missed the step and wasn't able to get himself up afterwards. Patient wife witnessed the fall, no head injuries was seen by her. Patient states was his lower right side hurts and his right hip. States normally ambulatory was shopping for the holidays today.  No LOC, denies preceding HA/chest pain/dyspnea/light headedness/dizziness.

## 2019-11-27 NOTE — ED ADULT NURSE NOTE - OBJECTIVE STATEMENT
89M aaox4 ambulatory bibems from home with c/o rt hip pain s/p fall on his buttocks after mistepping a 2-3 steps of stairs, pt reports on Eliquis. No bruising noted on PE but c/o tenderness on the rt hip lateral area. Distal pulses noted and strong, no obvious deformity noted. VS wnl. Denies any LOC or hitting head on the ground. Pain only with movement.

## 2019-11-28 DIAGNOSIS — Z29.9 ENCOUNTER FOR PROPHYLACTIC MEASURES, UNSPECIFIED: ICD-10-CM

## 2019-11-28 DIAGNOSIS — Z02.9 ENCOUNTER FOR ADMINISTRATIVE EXAMINATIONS, UNSPECIFIED: ICD-10-CM

## 2019-11-28 DIAGNOSIS — F03.90 UNSPECIFIED DEMENTIA WITHOUT BEHAVIORAL DISTURBANCE: ICD-10-CM

## 2019-11-28 DIAGNOSIS — D50.0 IRON DEFICIENCY ANEMIA SECONDARY TO BLOOD LOSS (CHRONIC): ICD-10-CM

## 2019-11-28 DIAGNOSIS — Z95.0 PRESENCE OF CARDIAC PACEMAKER: Chronic | ICD-10-CM

## 2019-11-28 DIAGNOSIS — I10 ESSENTIAL (PRIMARY) HYPERTENSION: ICD-10-CM

## 2019-11-28 DIAGNOSIS — I48.91 UNSPECIFIED ATRIAL FIBRILLATION: ICD-10-CM

## 2019-11-28 DIAGNOSIS — M35.3 POLYMYALGIA RHEUMATICA: ICD-10-CM

## 2019-11-28 DIAGNOSIS — S32.9XXA FRACTURE OF UNSPECIFIED PARTS OF LUMBOSACRAL SPINE AND PELVIS, INITIAL ENCOUNTER FOR CLOSED FRACTURE: ICD-10-CM

## 2019-11-28 DIAGNOSIS — K66.1 HEMOPERITONEUM: ICD-10-CM

## 2019-11-28 DIAGNOSIS — N40.0 BENIGN PROSTATIC HYPERPLASIA WITHOUT LOWER URINARY TRACT SYMPTOMS: ICD-10-CM

## 2019-11-28 LAB
ANION GAP SERPL CALC-SCNC: 13 MMOL/L — SIGNIFICANT CHANGE UP (ref 5–17)
BLD GP AB SCN SERPL QL: NEGATIVE — SIGNIFICANT CHANGE UP
BUN SERPL-MCNC: 26 MG/DL — HIGH (ref 7–23)
CALCIUM SERPL-MCNC: 10.2 MG/DL — SIGNIFICANT CHANGE UP (ref 8.4–10.5)
CHLORIDE SERPL-SCNC: 102 MMOL/L — SIGNIFICANT CHANGE UP (ref 96–108)
CO2 SERPL-SCNC: 23 MMOL/L — SIGNIFICANT CHANGE UP (ref 22–31)
CREAT SERPL-MCNC: 1 MG/DL — SIGNIFICANT CHANGE UP (ref 0.5–1.3)
GLUCOSE SERPL-MCNC: 92 MG/DL — SIGNIFICANT CHANGE UP (ref 70–99)
HCT VFR BLD CALC: 29.3 % — LOW (ref 39–50)
HCT VFR BLD CALC: 29.6 % — LOW (ref 39–50)
HCT VFR BLD CALC: 30.2 % — LOW (ref 39–50)
HGB BLD-MCNC: 9.6 G/DL — LOW (ref 13–17)
HGB BLD-MCNC: 9.7 G/DL — LOW (ref 13–17)
HGB BLD-MCNC: 9.8 G/DL — LOW (ref 13–17)
MAGNESIUM SERPL-MCNC: 2.1 MG/DL — SIGNIFICANT CHANGE UP (ref 1.6–2.6)
MCHC RBC-ENTMCNC: 32.4 PG — SIGNIFICANT CHANGE UP (ref 27–34)
MCHC RBC-ENTMCNC: 32.4 PG — SIGNIFICANT CHANGE UP (ref 27–34)
MCHC RBC-ENTMCNC: 32.5 GM/DL — SIGNIFICANT CHANGE UP (ref 32–36)
MCHC RBC-ENTMCNC: 32.6 PG — SIGNIFICANT CHANGE UP (ref 27–34)
MCHC RBC-ENTMCNC: 32.8 GM/DL — SIGNIFICANT CHANGE UP (ref 32–36)
MCHC RBC-ENTMCNC: 32.8 GM/DL — SIGNIFICANT CHANGE UP (ref 32–36)
MCV RBC AUTO: 100.3 FL — HIGH (ref 80–100)
MCV RBC AUTO: 99 FL — SIGNIFICANT CHANGE UP (ref 80–100)
MCV RBC AUTO: 99 FL — SIGNIFICANT CHANGE UP (ref 80–100)
NRBC # BLD: 0 /100 WBCS — SIGNIFICANT CHANGE UP (ref 0–0)
PHOSPHATE SERPL-MCNC: 2.5 MG/DL — SIGNIFICANT CHANGE UP (ref 2.5–4.5)
PLATELET # BLD AUTO: 129 K/UL — LOW (ref 150–400)
PLATELET # BLD AUTO: 138 K/UL — LOW (ref 150–400)
PLATELET # BLD AUTO: 143 K/UL — LOW (ref 150–400)
POTASSIUM SERPL-MCNC: 4.8 MMOL/L — SIGNIFICANT CHANGE UP (ref 3.5–5.3)
POTASSIUM SERPL-SCNC: 4.8 MMOL/L — SIGNIFICANT CHANGE UP (ref 3.5–5.3)
RBC # BLD: 2.96 M/UL — LOW (ref 4.2–5.8)
RBC # BLD: 2.99 M/UL — LOW (ref 4.2–5.8)
RBC # BLD: 3.01 M/UL — LOW (ref 4.2–5.8)
RBC # FLD: 14 % — SIGNIFICANT CHANGE UP (ref 10.3–14.5)
RBC # FLD: 14 % — SIGNIFICANT CHANGE UP (ref 10.3–14.5)
RBC # FLD: 14.2 % — SIGNIFICANT CHANGE UP (ref 10.3–14.5)
RH IG SCN BLD-IMP: POSITIVE — SIGNIFICANT CHANGE UP
SODIUM SERPL-SCNC: 138 MMOL/L — SIGNIFICANT CHANGE UP (ref 135–145)
WBC # BLD: 7.43 K/UL — SIGNIFICANT CHANGE UP (ref 3.8–10.5)
WBC # BLD: 7.96 K/UL — SIGNIFICANT CHANGE UP (ref 3.8–10.5)
WBC # BLD: 8.78 K/UL — SIGNIFICANT CHANGE UP (ref 3.8–10.5)
WBC # FLD AUTO: 7.43 K/UL — SIGNIFICANT CHANGE UP (ref 3.8–10.5)
WBC # FLD AUTO: 7.96 K/UL — SIGNIFICANT CHANGE UP (ref 3.8–10.5)
WBC # FLD AUTO: 8.78 K/UL — SIGNIFICANT CHANGE UP (ref 3.8–10.5)

## 2019-11-28 PROCEDURE — 99223 1ST HOSP IP/OBS HIGH 75: CPT | Mod: GC

## 2019-11-28 RX ORDER — ACETAMINOPHEN 500 MG
975 TABLET ORAL EVERY 8 HOURS
Refills: 0 | Status: COMPLETED | OUTPATIENT
Start: 2019-11-28 | End: 2019-11-29

## 2019-11-28 RX ORDER — APIXABAN 2.5 MG/1
1 TABLET, FILM COATED ORAL
Qty: 0 | Refills: 0 | DISCHARGE

## 2019-11-28 RX ORDER — SENNA PLUS 8.6 MG/1
2 TABLET ORAL AT BEDTIME
Refills: 0 | Status: DISCONTINUED | OUTPATIENT
Start: 2019-11-28 | End: 2019-11-30

## 2019-11-28 RX ORDER — METOPROLOL TARTRATE 50 MG
1 TABLET ORAL
Qty: 0 | Refills: 0 | DISCHARGE

## 2019-11-28 RX ORDER — DILTIAZEM HCL 120 MG
1 CAPSULE, EXT RELEASE 24 HR ORAL
Qty: 0 | Refills: 0 | DISCHARGE

## 2019-11-28 RX ORDER — MEMANTINE HYDROCHLORIDE 10 MG/1
10 TABLET ORAL
Refills: 0 | Status: DISCONTINUED | OUTPATIENT
Start: 2019-11-28 | End: 2019-11-30

## 2019-11-28 RX ORDER — POLYETHYLENE GLYCOL 3350 17 G/17G
17 POWDER, FOR SOLUTION ORAL DAILY
Refills: 0 | Status: DISCONTINUED | OUTPATIENT
Start: 2019-11-28 | End: 2019-11-30

## 2019-11-28 RX ORDER — CYCLOBENZAPRINE HYDROCHLORIDE 10 MG/1
5 TABLET, FILM COATED ORAL ONCE
Refills: 0 | Status: DISCONTINUED | OUTPATIENT
Start: 2019-11-28 | End: 2019-11-30

## 2019-11-28 RX ORDER — DONEPEZIL HYDROCHLORIDE 10 MG/1
5 TABLET, FILM COATED ORAL
Refills: 0 | Status: DISCONTINUED | OUTPATIENT
Start: 2019-11-28 | End: 2019-11-30

## 2019-11-28 RX ORDER — CYCLOBENZAPRINE HYDROCHLORIDE 10 MG/1
5 TABLET, FILM COATED ORAL ONCE
Refills: 0 | Status: COMPLETED | OUTPATIENT
Start: 2019-11-28 | End: 2019-11-28

## 2019-11-28 RX ORDER — ATORVASTATIN CALCIUM 80 MG/1
1 TABLET, FILM COATED ORAL
Qty: 0 | Refills: 0 | DISCHARGE

## 2019-11-28 RX ORDER — LIDOCAINE 4 G/100G
1 CREAM TOPICAL EVERY 24 HOURS
Refills: 0 | Status: DISCONTINUED | OUTPATIENT
Start: 2019-11-28 | End: 2019-11-30

## 2019-11-28 RX ORDER — ATORVASTATIN CALCIUM 80 MG/1
40 TABLET, FILM COATED ORAL AT BEDTIME
Refills: 0 | Status: DISCONTINUED | OUTPATIENT
Start: 2019-11-28 | End: 2019-11-30

## 2019-11-28 RX ADMIN — Medication 975 MILLIGRAM(S): at 06:54

## 2019-11-28 RX ADMIN — ATORVASTATIN CALCIUM 40 MILLIGRAM(S): 80 TABLET, FILM COATED ORAL at 22:12

## 2019-11-28 RX ADMIN — POLYETHYLENE GLYCOL 3350 17 GRAM(S): 17 POWDER, FOR SOLUTION ORAL at 22:11

## 2019-11-28 RX ADMIN — Medication 975 MILLIGRAM(S): at 22:15

## 2019-11-28 RX ADMIN — SENNA PLUS 2 TABLET(S): 8.6 TABLET ORAL at 22:11

## 2019-11-28 RX ADMIN — Medication 1 MILLIGRAM(S): at 05:11

## 2019-11-28 RX ADMIN — Medication 975 MILLIGRAM(S): at 05:11

## 2019-11-28 RX ADMIN — DONEPEZIL HYDROCHLORIDE 5 MILLIGRAM(S): 10 TABLET, FILM COATED ORAL at 11:59

## 2019-11-28 RX ADMIN — CYCLOBENZAPRINE HYDROCHLORIDE 5 MILLIGRAM(S): 10 TABLET, FILM COATED ORAL at 11:59

## 2019-11-28 RX ADMIN — Medication 975 MILLIGRAM(S): at 13:13

## 2019-11-28 RX ADMIN — MEMANTINE HYDROCHLORIDE 10 MILLIGRAM(S): 10 TABLET ORAL at 17:28

## 2019-11-28 RX ADMIN — DONEPEZIL HYDROCHLORIDE 5 MILLIGRAM(S): 10 TABLET, FILM COATED ORAL at 21:02

## 2019-11-28 RX ADMIN — MEMANTINE HYDROCHLORIDE 10 MILLIGRAM(S): 10 TABLET ORAL at 05:11

## 2019-11-28 RX ADMIN — Medication 1 MILLIGRAM(S): at 17:27

## 2019-11-28 RX ADMIN — Medication 1 TABLET(S): at 13:14

## 2019-11-28 RX ADMIN — Medication 975 MILLIGRAM(S): at 21:02

## 2019-11-28 NOTE — H&P ADULT - ASSESSMENT
89 y/o M with PMH afib on eliquis, PPM, HTN, BPH, and dementia who presents to the hospital s/p mechanical fall, admitted for R comminuted iliac fracture complicated by RP hematoma.

## 2019-11-28 NOTE — H&P ADULT - PROBLEM SELECTOR PLAN 5
- on quinapril and metoprolol at home  - will hold BP meds given concern for acute bleeding at this time

## 2019-11-28 NOTE — H&P ADULT - PROBLEM SELECTOR PLAN 2
- patient with evidence of RP hematoma on CT of the pelvis  - Hgb has dropped from last ED visit 2 weeks ago from 12.7 --> 10.5 --> 9.6   - patient's BP and HR stable but BP now in 110s and HR in low 100s  - will monitor CBC q6 hours for now and maintain active type and screen because if patient becomes hemodynamically unstable, he will require blood   - monitor patient's symptoms for worsening pain - patient with evidence of RP hematoma on CT of the pelvis  - Hgb has dropped from last ED visit 2 weeks ago from 12.7 --> 10.5 --> 9.6   - patient's BP and HR stable but BP now in 110s and HR in low 100s  - will monitor CBC q6 hours for now and maintain active type and screen because if patient becomes hemodynamically unstable, he will require blood and repeat imaging to assess hematoma size  - monitor patient's symptoms for worsening pain

## 2019-11-28 NOTE — PROGRESS NOTE ADULT - PROBLEM SELECTOR PLAN 4
- [id number]  - patient on eliquis 2.5 mg bid at home  - rate controlled at this time and rhythm controlled as well  - holding home metoprolol tartrate 25mg to avoid masking tachycardia that could indicate active bleeding  - patient now with three falls within the last year --> will discuss with patient and family about benefits vs risks of being on AC - [id number] 3  - patient on eliquis 2.5 mg bid at home  - rate controlled at this time and rhythm controlled as well  - holding home metoprolol succinate 25mg to avoid masking tachycardia that could indicate active bleeding  - patient now with three falls within the last year --> will discuss with patient and family about benefits vs risks of being on AC - [id number]  - patient on eliquis 2.5 mg bid at home  - rate controlled at this time and rhythm controlled as well  - holding home metoprolol succinate 25mg to avoid masking tachycardia that could indicate active bleeding, will re-start tomorrow, if hgb remains stable  - patient now with three falls within the last year --> will discuss with patient and family about benefits vs risks of being on AC

## 2019-11-28 NOTE — H&P ADULT - NSHPLABSRESULTS_GEN_ALL_CORE
9.6    8.78  )-----------( 143      ( 2019 01:46 )             29.3     Complete Blood Count + Automated Diff (19 @ 19:58)    WBC Count: 8.07 K/uL    RBC Count: 3.25 M/uL    Hemoglobin: 10.5 g/dL    Hematocrit: 32.6 %    Mean Cell Volume: 100.3 fl    Mean Cell Hemoglobin: 32.3 pg    Mean Cell Hemoglobin Conc: 32.2 gm/dL    Red Cell Distrib Width: 14.1 %    Platelet Count - Automated: 138 K/uL    Auto Neutrophil #: 5.75 K/uL    Auto Lymphocyte #: 1.04 K/uL    Auto Monocyte #: 1.00 K/uL    Auto Eosinophil #: 0.22 K/uL    Auto Basophil #: 0.02 K/uL    Auto Neutrophil %: 71.3: Differential percentages must be correlated with absolute numbers for  clinical significance. %    Auto Lymphocyte %: 12.9 %    Auto Monocyte %: 12.4 %    Auto Eosinophil %: 2.7 %    Auto Basophil %: 0.2 %    Auto Immature Granulocyte %: 0.5 %    Nucleated RBC: 0 /100 WBCs          140  |  105  |  34<H>  ----------------------------<  104<H>  5.1   |  24  |  1.25    Ca    10.7<H>      2019 19:58    TPro  7.0  /  Alb  4.1  /  TBili  0.3  /  DBili  x   /  AST  37  /  ALT  20  /  AlkPhos  86        EK bpm, NSR with T wave inversions in leads III and aVR    RADIOLOGY    < from: CT Head No Cont (19 @ 12:18) >    IMPRESSION:  No evidence of acute intracranial pathology. No evidence of intracranial   hemorrhage or calvarial fracture.    No evidence of cervical spine fracture or subluxation. Multilevel   degenerative changes.    JONY DESIR M.D., RADIOLOGY FELLOW  This document has been electronically signed.  SIOMARA ALBERTS M.D., ATTENDING RADIOLOGIST  This document has been electronically signed. 2019  1:18PM    < end of copied text >    < from: CT Pelvis Bony Only No Cont (19 @ 21:45) >    INTERPRETATION:  Comminuted Right iliac fracture with associated   retroperitoneal hematoma involving the right iliacus, ilopsoas, and   quadratus lumborum muscles.     d/w Dr. Reyna    Follow up official report in AM      ******PRELIMINARY REPORT******    ******PRELIMINARY REPORT******          AUGUSTA JAVIER M.D., RADIOLOGY RESIDENT      < end of copied text > Labs, imaging and EKG personally reviewed and interpreted by me - normal WBC, Hb downtrending 9.6 <-- 10.5, normal BUN/Cr, CT showing comminuted R iliac fracture with RP hematoma. EK bpm, NSR with T wave inversions in leads III and aVR                  9.6    8.78  )-----------( 143      ( 2019 01:46 )             29.3     Complete Blood Count + Automated Diff (19 @ 19:58)    WBC Count: 8.07 K/uL    RBC Count: 3.25 M/uL    Hemoglobin: 10.5 g/dL    Hematocrit: 32.6 %    Mean Cell Volume: 100.3 fl    Mean Cell Hemoglobin: 32.3 pg    Mean Cell Hemoglobin Conc: 32.2 gm/dL    Red Cell Distrib Width: 14.1 %    Platelet Count - Automated: 138 K/uL    Auto Neutrophil #: 5.75 K/uL    Auto Lymphocyte #: 1.04 K/uL    Auto Monocyte #: 1.00 K/uL    Auto Eosinophil #: 0.22 K/uL    Auto Basophil #: 0.02 K/uL    Auto Neutrophil %: 71.3: Differential percentages must be correlated with absolute numbers for  clinical significance. %    Auto Lymphocyte %: 12.9 %    Auto Monocyte %: 12.4 %    Auto Eosinophil %: 2.7 %    Auto Basophil %: 0.2 %    Auto Immature Granulocyte %: 0.5 %    Nucleated RBC: 0 /100 WBCs          140  |  105  |  34<H>  ----------------------------<  104<H>  5.1   |  24  |  1.25    Ca    10.7<H>      2019 19:58    TPro  7.0  /  Alb  4.1  /  TBili  0.3  /  DBili  x   /  AST  37  /  ALT  20  /  AlkPhos  86            RADIOLOGY    < from: CT Head No Cont (19 @ 12:18) >  IMPRESSION:  No evidence of acute intracranial pathology. No evidence of intracranial   hemorrhage or calvarial fracture.  No evidence of cervical spine fracture or subluxation. Multilevel   degenerative changes.      < from: CT Pelvis Bony Only No Cont (19 @ 21:45) >  INTERPRETATION:  Comminuted Right iliac fracture with associated   retroperitoneal hematoma involving the right iliacus, ilopsoas, and   quadratus lumborum muscles.

## 2019-11-28 NOTE — PROGRESS NOTE ADULT - PROBLEM SELECTOR PLAN 1
- presents s/p mechanical fall with R comminuted fracture of the iliac  - appreciate ortho recs --> no surgical intervention needed for this type of fracture, WBAT  - pain control with 975mg tylenol q8 hours standing --> patient currently not endorsing any pain  - can consider using small dose oxycodone prior to working with PT for severe pain  - PT consulted - presents s/p mechanical fall with R comminuted fracture of the iliac  - appreciate ortho recs --> no surgical intervention needed for this type of fracture, WBAT  - pain control with 975mg tylenol q8h standing --> patient currently not endorsing any pain at rest in bed  - can consider using small dose oxycodone or tramadol for severe pain or prior to working with PT for severe pain  - PT consulted - presents s/p mechanical fall with R comminuted fracture of the anterior iliac  - appreciate ortho recs --> no surgical intervention needed for this type of fracture, WBAT  - pain control with 975mg tylenol q8h standing --> patient currently not endorsing any pain at rest in bed  - can consider using small dose oxycodone or tramadol for severe pain or prior to working with PT for severe pain  - PT consulted  - Fall precautions - of note, this is his second mechanical fall this month

## 2019-11-28 NOTE — H&P ADULT - PROBLEM SELECTOR PLAN 8
- per patients wife, has been on chronic prednisone 1mg daily for many years for diagnosis of polymyalgia rheumatica  - per wife, patient was sleeping more throughout the day and prednisone dose was recently increased to 1mg BID (within the last few weeks)  - will continue while in the hospital

## 2019-11-28 NOTE — PROGRESS NOTE ADULT - SUBJECTIVE AND OBJECTIVE BOX
Rigo Diaz   Internal Medicine PGY-1  [id number]      PATIENT:  JONY JAEGER  [id number]      SUMMARY:      INTERVAL HISTORY/OVERNIGHT EVENTS:      SUBJECTIVE:      OBJECTIVE:    T(C): 36.6 (11-28-19 @ 04:36), Max: 36.8 (11-27-19 @ 19:19)  HR: 90 (11-28-19 @ 04:36) (78 - 101)  BP: 137/88 (11-28-19 @ 04:36) (114/72 - 162/79)  RR: 16 (11-28-19 @ 04:36) (14 - 20)  SpO2: 98% (11-28-19 @ 04:36) (97% - 100%)      11-27-19 @ 07:01  -  11-28-19 @ 07:00  --------------------------------------------------------  IN: 500 mL / OUT: 200 mL / NET: 300 mL          CONSTITUTIONAL: NAD, well-developed, resting comfortably   HEENT: NC/AT, PERRLA, EOMI, oropharnyx clear, no LAD  CV: RRR, normal S1,S2; no murmurs  RESPIRATORY: Normal respiratory effort; lungs CTAB, no crackles, rhonchi, or wheezes  ABDOMEN: soft, non-tender to palpation, non-distended, normal bowel sounds, no rebound/guarding; no hepatosplenomegaly  EXTREMITIES: No lower extremity edema; peripheral pulses are 2+ bilaterally  MUSCULOSKELETAL: no clubbing or cyanosis of digits; no joint swelling or tenderness to palpation  NEURO: nonfocal, strength grossly intact  PSYCH: A+O to person, place, and time; affect appropriate  SKIN: warm, dry      LABS:                          9.6    8.78  )-----------( 143      ( 28 Nov 2019 01:46 )             29.3     11-27    140  |  105  |  34<H>  ----------------------------<  104<H>  5.1   |  24  |  1.25    Ca    10.7<H>      27 Nov 2019 19:58    TPro  7.0  /  Alb  4.1  /  TBili  0.3  /  DBili  x   /  AST  37  /  ALT  20  /  AlkPhos  86  11-27    LIVER FUNCTIONS - ( 27 Nov 2019 19:58 )  Alb: 4.1 g/dL / Pro: 7.0 g/dL / ALK PHOS: 86 U/L / ALT: 20 U/L / AST: 37 U/L / GGT: x             PT/INR - ( 27 Nov 2019 19:58 )   PT: 12.8 sec;   INR: 1.12 ratio         PTT - ( 27 Nov 2019 19:58 )  PTT:23.4 sec                    IMAGING:      MEDICATIONS:  MEDICATIONS  (STANDING):  acetaminophen   Tablet .. 975 milliGRAM(s) Oral every 8 hours  atorvastatin 40 milliGRAM(s) Oral at bedtime  donepezil 5 milliGRAM(s) Oral <User Schedule>  memantine 10 milliGRAM(s) Oral two times a day  multivitamin 1 Tablet(s) Oral daily  predniSONE   Tablet 1 milliGRAM(s) Oral two times a day    MEDICATIONS  (PRN): Rigo Diaz PGY 1  Internal Medicine  [id number]      Progress Note:    Patient is a 89y old  Male who presents with a chief complaint of R Iliac Fracture, RP bleed (28 Nov 2019 08:12)      SUBJECTIVE / OVERNIGHT EVENTS: Admitted to floor. No major events. No pain while at rest in bed. No chest pain, lighthadedness, n/v, f/c, or SOB.     ADDITIONAL REVIEW OF SYSTEMS:    MEDICATIONS  (STANDING):  acetaminophen   Tablet .. 975 milliGRAM(s) Oral every 8 hours  atorvastatin 40 milliGRAM(s) Oral at bedtime  donepezil 5 milliGRAM(s) Oral <User Schedule>  memantine 10 milliGRAM(s) Oral two times a day  multivitamin 1 Tablet(s) Oral daily  predniSONE   Tablet 1 milliGRAM(s) Oral two times a day    MEDICATIONS  (PRN):      CAPILLARY BLOOD GLUCOSE        I&O's Summary    27 Nov 2019 07:01  -  28 Nov 2019 07:00  --------------------------------------------------------  IN: 500 mL / OUT: 200 mL / NET: 300 mL        PHYSICAL EXAM:  Vital Signs Last 24 Hrs  T(C): 36.6 (28 Nov 2019 04:36), Max: 36.8 (27 Nov 2019 19:19)  T(F): 97.8 (28 Nov 2019 04:36), Max: 98.3 (27 Nov 2019 19:19)  HR: 90 (28 Nov 2019 04:36) (78 - 101)  BP: 137/88 (28 Nov 2019 04:36) (114/72 - 162/79)  BP(mean): --  RR: 16 (28 Nov 2019 04:36) (14 - 20)  SpO2: 98% (28 Nov 2019 04:36) (97% - 100%)    CONSTITUTIONAL: NAD, well-developed, resting comfortably   HEENT: NC/AT, PERRLA, EOMI, oropharnyx clear, no LAD  CV: RRR, normal S1,S2; soft systolic murmur  RESPIRATORY: Normal respiratory effort; lungs CTAB, no crackles, rhonchi, or wheezes  ABDOMEN: soft, non-tender to palpation, non-distended, normal bowel sounds, no rebound/guarding  EXTREMITIES: No lower extremity edema; peripheral pulses are 2+ bilaterally  MUSCULOSKELETAL: no clubbing or cyanosis of digits; no joint swelling or tenderness to palpation  NEURO: nonfocal, strength grossly intact  PSYCH: A+O to person, place, and time; affect appropriate  SKIN: warm, dry        LABS:                        9.8    7.43  )-----------( 129      ( 28 Nov 2019 09:44 )             30.2     11-28    138  |  102  |  26<H>  ----------------------------<  92  4.8   |  23  |  1.00    Ca    10.2      28 Nov 2019 09:44    TPro  7.0  /  Alb  4.1  /  TBili  0.3  /  DBili  x   /  AST  37  /  ALT  20  /  AlkPhos  86  11-27    PT/INR - ( 27 Nov 2019 19:58 )   PT: 12.8 sec;   INR: 1.12 ratio         PTT - ( 27 Nov 2019 19:58 )  PTT:23.4 sec            Telemetry:       RADIOLOGY & ADDITIONAL TESTS: Rigo Diaz PGY 1  Internal Medicine  [id number]      Progress Note:    Patient is a 89y old  Male who presents with a chief complaint of R Iliac Fracture, RP bleed (28 Nov 2019 08:12)      SUBJECTIVE / OVERNIGHT EVENTS: Admitted to floor. No major events. No pain while at rest in bed. No chest pain, lighthadedness, n/v, f/c, or SOB.     ADDITIONAL REVIEW OF SYSTEMS:    MEDICATIONS  (STANDING):  acetaminophen   Tablet .. 975 milliGRAM(s) Oral every 8 hours  atorvastatin 40 milliGRAM(s) Oral at bedtime  donepezil 5 milliGRAM(s) Oral <User Schedule>  memantine 10 milliGRAM(s) Oral two times a day  multivitamin 1 Tablet(s) Oral daily  predniSONE   Tablet 1 milliGRAM(s) Oral two times a day    MEDICATIONS  (PRN):      CAPILLARY BLOOD GLUCOSE        I&O's Summary    27 Nov 2019 07:01  -  28 Nov 2019 07:00  --------------------------------------------------------  IN: 500 mL / OUT: 200 mL / NET: 300 mL        PHYSICAL EXAM:  Vital Signs Last 24 Hrs  T(C): 36.6 (28 Nov 2019 04:36), Max: 36.8 (27 Nov 2019 19:19)  T(F): 97.8 (28 Nov 2019 04:36), Max: 98.3 (27 Nov 2019 19:19)  HR: 90 (28 Nov 2019 04:36) (78 - 101)  BP: 137/88 (28 Nov 2019 04:36) (114/72 - 162/79)  BP(mean): --  RR: 16 (28 Nov 2019 04:36) (14 - 20)  SpO2: 98% (28 Nov 2019 04:36) (97% - 100%)    CONSTITUTIONAL: NAD, well-developed, resting comfortably   HEENT: NC/AT, PERRLA, EOMI, oropharnyx clear, no LAD  CV: RRR, normal S1,S2; soft systolic murmur  RESPIRATORY: Normal respiratory effort; lungs CTAB, no crackles, rhonchi, or wheezes  ABDOMEN: soft, non-tender to palpation, non-distended, normal bowel sounds, no rebound/guarding  EXTREMITIES: No lower extremity edema; peripheral pulses are 2+ bilaterally  MUSCULOSKELETAL: right LE warm, well-perfused, mild tenderness to palpation over anterior hip, no skin changes; unable to flex leg due to pain  NEURO: nonfocal, strength grossly intact  PSYCH: A+O to person, place, and time; affect appropriate  SKIN: warm, dry        LABS:                        9.8    7.43  )-----------( 129      ( 28 Nov 2019 09:44 )             30.2     11-28    138  |  102  |  26<H>  ----------------------------<  92  4.8   |  23  |  1.00    Ca    10.2      28 Nov 2019 09:44    TPro  7.0  /  Alb  4.1  /  TBili  0.3  /  DBili  x   /  AST  37  /  ALT  20  /  AlkPhos  86  11-27    PT/INR - ( 27 Nov 2019 19:58 )   PT: 12.8 sec;   INR: 1.12 ratio         PTT - ( 27 Nov 2019 19:58 )  PTT:23.4 sec            Telemetry:       RADIOLOGY & ADDITIONAL TESTS: Rigo Diaz PGY 1  Internal Medicine  [id number]      Progress Note:    Patient is a 89y old  Male who presents with a chief complaint of R Iliac Fracture, RP bleed (28 Nov 2019 08:12)      SUBJECTIVE / OVERNIGHT EVENTS: Admitted to floor. No major events. No hip pain while at rest in bed. Right sided shoulder soreness. No chest pain, lightheadedness n/v, f/c, or SOB.     ADDITIONAL REVIEW OF SYSTEMS:    MEDICATIONS  (STANDING):  acetaminophen   Tablet .. 975 milliGRAM(s) Oral every 8 hours  atorvastatin 40 milliGRAM(s) Oral at bedtime  donepezil 5 milliGRAM(s) Oral <User Schedule>  memantine 10 milliGRAM(s) Oral two times a day  multivitamin 1 Tablet(s) Oral daily  predniSONE   Tablet 1 milliGRAM(s) Oral two times a day    MEDICATIONS  (PRN):      CAPILLARY BLOOD GLUCOSE        I&O's Summary    27 Nov 2019 07:01  -  28 Nov 2019 07:00  --------------------------------------------------------  IN: 500 mL / OUT: 200 mL / NET: 300 mL        PHYSICAL EXAM:  Vital Signs Last 24 Hrs  T(C): 36.6 (28 Nov 2019 04:36), Max: 36.8 (27 Nov 2019 19:19)  T(F): 97.8 (28 Nov 2019 04:36), Max: 98.3 (27 Nov 2019 19:19)  HR: 90 (28 Nov 2019 04:36) (78 - 101)  BP: 137/88 (28 Nov 2019 04:36) (114/72 - 162/79)  BP(mean): --  RR: 16 (28 Nov 2019 04:36) (14 - 20)  SpO2: 98% (28 Nov 2019 04:36) (97% - 100%)    CONSTITUTIONAL: NAD, well-developed, resting comfortably   HEENT: NC/AT, PERRLA, EOMI, oropharnyx clear, no LAD  CV: RRR, normal S1,S2; soft systolic murmur  RESPIRATORY: Normal respiratory effort; lungs CTAB, no crackles, rhonchi, or wheezes  ABDOMEN: soft, non-tender to palpation, non-distended, normal bowel sounds, no rebound/guarding  EXTREMITIES: No lower extremity edema; peripheral pulses are 2+ bilaterally  MUSCULOSKELETAL: right LE warm, well-perfused, mild tenderness to palpation over anterior hip, no skin changes; unable to flex leg due to pain; R UE non-tender, normal ROM  NEURO: nonfocal, strength grossly intact  PSYCH: A+O to person, place, and time; affect appropriate  SKIN: warm, dry        LABS:                        9.8    7.43  )-----------( 129      ( 28 Nov 2019 09:44 )             30.2     11-28    138  |  102  |  26<H>  ----------------------------<  92  4.8   |  23  |  1.00    Ca    10.2      28 Nov 2019 09:44    TPro  7.0  /  Alb  4.1  /  TBili  0.3  /  DBili  x   /  AST  37  /  ALT  20  /  AlkPhos  86  11-27    PT/INR - ( 27 Nov 2019 19:58 )   PT: 12.8 sec;   INR: 1.12 ratio         PTT - ( 27 Nov 2019 19:58 )  PTT:23.4 sec            Telemetry:       RADIOLOGY & ADDITIONAL TESTS:

## 2019-11-28 NOTE — PROGRESS NOTE ADULT - PROBLEM SELECTOR PLAN 2
- patient with evidence of RP hematoma on CT of the pelvis  - Hgb has dropped from last ED visit 2 weeks ago from 12.7 --> 10.5 --> 9.6, now stable at 9.8 this am.  -HDS, but mildly tachycardic  bpm.  -CBC q8h hours  -maintain active type and screen  -repeat imaging if Hb downtrending - patient with evidence of RP hematoma on CT of the pelvis  - Hgb has dropped from last ED visit 2 weeks ago from 12.7 --> 10.5 --> 9.6, now stable at 9.8 this am.  -HDS, but mildly tachycardic  bpm.  -CBC q8h hours  -maintain active type and screen  -repeat imaging if Hb downtrending, transfuse for Hb < 7

## 2019-11-28 NOTE — H&P ADULT - NSHPPHYSICALEXAM_GEN_ALL_CORE
Vital Signs Last 24 Hrs  T(C): 36.4 (28 Nov 2019 00:09), Max: 36.8 (27 Nov 2019 19:19)  T(F): 97.6 (28 Nov 2019 00:09), Max: 98.3 (27 Nov 2019 19:19)  HR: 101 (28 Nov 2019 00:09) (78 - 101)  BP: 114/72 (28 Nov 2019 00:09) (114/72 - 162/79)  BP(mean): --  RR: 14 (28 Nov 2019 00:09) (14 - 20)  SpO2: 97% (28 Nov 2019 00:09) (97% - 100%)    PHYSICAL EXAM:    GENERAL: Comfortable, no acute distress   HEAD:  Normocephalic, atraumatic  EYES: EOMI, PERRLA  HEENT: Moist mucous membranes  NECK: Supple, No JVD  NERVOUS SYSTEM: AAOx2 (does not know year), CN II-XII grossly in tact bilaterally, unable to raise R leg off the bed secondary to pain but able to raise his knee, sensation grossly in tact   CHEST/LUNG: Clear to auscultation bilaterally  HEART: RRR, grade II/VI systolic murmur appreciated   ABDOMEN: +BS, soft, non tender, non distended   EXTREMITIES:   No clubbing, cyanosis, or edema  MUSCULOSKELETAL: no bony tenderness elicited on palpation of R hip, no ecchymoses noted   SKIN: warm and dry, no rash

## 2019-11-28 NOTE — H&P ADULT - NSHPSOCIALHISTORY_GEN_ALL_CORE
Patient lives at home with his wife. He smoked less than 1ppd for 12 years (quit at age 27), denies alcohol or recreational drug use.

## 2019-11-28 NOTE — PROGRESS NOTE ADULT - PROBLEM SELECTOR PLAN 10
1.  Name of PCP: Judson Murrieta   2.  PCP Contacted on Admission: [ ] Y    [ ] N    3.  PCP contacted at Discharge: [ ] Y    [ ] N    [ ] N/A  4.  Post-Discharge Appointment Date and Location:  5.  Summary of Handoff given to PCP:

## 2019-11-28 NOTE — H&P ADULT - PROBLEM SELECTOR PLAN 10
1.  Name of PCP: Jduson Murrieta   2.  PCP Contacted on Admission: [ ] Y    [ ] N    3.  PCP contacted at Discharge: [ ] Y    [ ] N    [ ] N/A  4.  Post-Discharge Appointment Date and Location:  5.  Summary of Handoff given to PCP:

## 2019-11-28 NOTE — PROGRESS NOTE ADULT - ASSESSMENT
89 y/o M with PMH afib on eliquis, PPM, HTN, BPH, and dementia who presents to the hospital s/p mechanical fall, admitted for R comminuted iliac fracture complicated by RP hematoma. 89 y/o M with PMH afib on eliquis, CABG x5, PPM, HTN, BPH, and dementia who presents to the hospital s/p mechanical fall, admitted for R comminuted iliac fracture complicated by RP hematoma.

## 2019-11-28 NOTE — PROGRESS NOTE ADULT - PROBLEM SELECTOR PLAN 5
- on quinapril 10 mg and metoprolol 25 mg at home  - will hold BP meds given concern for acute bleeding at this time - on quinapril 10 mg and metoprolol succ 25 mg at home  - will hold BP meds given concern for acute bleeding at this time

## 2019-11-28 NOTE — H&P ADULT - ATTENDING COMMENTS
Pt seen and examined at bedside. Pt with mechanical fall while climbing up stairs, onto R side, with imaging now showing R iliac fracture and RP hematoma. No surgical intervention for fracture as per ortho, WBAT and PT eval with pain control. Pt with downtrending Hg in setting of RP hematoma, HD stable thus far - will c/t monitor vitals and CBC closely, if continued to drop may require transfusions and further imaging and possible surgical vs IR intervention.    Patient assigned to me by night hospitalist in charge for management and care for patient for this evening only. Care to be resumed by day hospitalist in the morning and thereafter.     Pt seen and examined. Case d/w house staff Dr. Gomez. Agree with assessment and plan, with changes made where appropriate.

## 2019-11-28 NOTE — H&P ADULT - NSICDXPASTMEDICALHX_GEN_ALL_CORE_FT
PAST MEDICAL HISTORY:  Atrial fibrillation     BPH (benign prostatic hyperplasia)     History of polymyalgia rheumatica this is why patient is on chronic prednisone    HTN (hypertension)

## 2019-11-28 NOTE — H&P ADULT - PROBLEM SELECTOR PLAN 3
- patient with acute blood loss anemia secondary to new RP hematoma as a result of recent fall while on blood thinners  - normocytic anemia and in this clinical scenario, secondary to blood loss  - maintain active type and screen, transfuse if actively bleeding or Hgb < 7

## 2019-11-28 NOTE — PROGRESS NOTE ADULT - PROBLEM SELECTOR PLAN 7
- patient on finasteride and tamsulosin at home for BPH  - no signs of retention at this time  - will hold both medications at this time to avoid dropping patient's BP

## 2019-11-28 NOTE — H&P ADULT - NSHPREVIEWOFSYSTEMS_GEN_ALL_CORE
REVIEW OF SYSTEMS    CONSTITUTIONAL:  Denies fevers or chills   HEENT: Denies vision changes or sore throat   SKIN:  Denies rash or itching.  CARDIOVASCULAR:  Denies chest pain, DUYEN  RESPIRATORY:  Denies shortness of breath, cough or sputum.  GASTROINTESTINAL:  Denies abdominal pain, nausea, or vomiting   GENITOURINARY:  Denies any hematuria, dysuria  NEUROLOGICAL:  Denies headache, dizziness, or near syncope   MUSCULOSKELETAL:  + R hip pain   HEMATOLOGIC:  Denies anemia, bleeding or bruising.  LYMPHATICS:  Denies enlarged nodes.

## 2019-11-28 NOTE — H&P ADULT - HISTORY OF PRESENT ILLNESS
89 y/o M with PMH afib on eliquis, PPM, HTN, BPH, and dementia who presents to the hospital s/p mechanical fall. Patient states he had been walking his dog outside and was entering his home when he fell when he was trying to climb the steps into his home. He fell on his right hip, did not hit his head or loose consciousness. Denies any prodromal symptoms, CP, or SOB. He states immediately after he fell, he could not move his R leg at all so he called EMS. Of note, this is the third fall in the last year.     Upon arrival to the ED, T: 98.3, HR: 70s-100, BP: 152/78, RR 20 sating 99% on RA. He was given 975mg of tylenol. 89 y/o M with PMH afib on eliquis, PPM, HTN, BPH, and dementia who presents to the hospital s/p mechanical fall. Patient states he had been walking his dog outside and was entering his home when he fell when he was trying to climb the steps into his home. He fell on his right hip, did not hit his head or loose consciousness. Denies any prodromal symptoms, CP, or SOB. He states immediately after he fell, he could not move his R leg at all so he called EMS. Pain was 10/10, all throughout the R leg; currently pain is improved, 5/10 located in R hip radiating to knee, no pain below the knee, denies any numbness/tingling, sensation intact throughout. Of note, this is the third fall in the last year.     Upon arrival to the ED, T: 98.3, HR: 70s-100, BP: 152/78, RR 20 sating 99% on RA. He was given 975mg of tylenol.

## 2019-11-28 NOTE — PHYSICAL THERAPY INITIAL EVALUATION ADULT - ACTIVE RANGE OF MOTION EXAMINATION, REHAB EVAL
bilateral upper extremity Active ROM was WFL (within functional limits)/bilateral  lower extremity Active ROM was WFL (within functional limits)/RLE limited by pain

## 2019-11-28 NOTE — PHYSICAL THERAPY INITIAL EVALUATION ADULT - ADDITIONAL COMMENTS
Pt lives in private home with spouse, no steps to enter through back, 3 steps from front. Full flight inside home to bedroom, able to reside on first floor if needed. Prior to admission pt was independent with all functional mobility.

## 2019-11-28 NOTE — PHYSICAL THERAPY INITIAL EVALUATION ADULT - PERTINENT HX OF CURRENT PROBLEM, REHAB EVAL
89 y/o M with PMH afib on eliquis, PPM, HTN, BPH, and dementia who presents to the hospital s/p mechanical fall. Pt states he had been walking his dog outside and was entering his home when he fell when he was trying to climb the steps into his home. He fell on his R hip, did not hit his head/no LOC. Denies any prodromal symptoms, CP, or SOB. He states immediately after he fell, he could not move his R leg at all so he called EMS.

## 2019-11-28 NOTE — H&P ADULT - PROBLEM SELECTOR PLAN 9
- DVT ppx SCDs  - DASH diet  - PT  - Dispo pending PT recs and stabilization of CBC    Margaret Carlson MD  PGY3 Internal Medicine  Pager [phone]

## 2019-11-28 NOTE — H&P ADULT - PROBLEM SELECTOR PLAN 4
- [id number] 3  - patient on eliquis at home  - rate controlled at this time and rhythm controlled as well  - holding home metoprolol tartrate 25mg daily (confirmed dosing with patient's wife, will need to clarify with son in AM when he brings list) to avoid masking tachycardia that could indicate active bleeding  - patient now with three falls within the last year --> team should discuss with patient and family about benefits vs risks of being on AC

## 2019-11-28 NOTE — PHYSICAL THERAPY INITIAL EVALUATION ADULT - PRECAUTIONS/LIMITATIONS, REHAB EVAL
fall precautions/Pain was 10/10, all throughout the R leg; currently pain is improved, 5/10 located in R hip radiating to knee, no pain below the knee, denies any numbness/tingling, sensation intact throughout. Of note, this is the third fall in the last year. CTPelvis: Acute, comminuted and displaced fracture of the right anterior iliac bone with adjacent soft tissue hematoma.  CXR (-)

## 2019-11-29 ENCOUNTER — TRANSCRIPTION ENCOUNTER (OUTPATIENT)
Age: 84
End: 2019-11-29

## 2019-11-29 LAB
24R-OH-CALCIDIOL SERPL-MCNC: 41.3 NG/ML — SIGNIFICANT CHANGE UP (ref 30–80)
ANION GAP SERPL CALC-SCNC: 10 MMOL/L — SIGNIFICANT CHANGE UP (ref 5–17)
BASOPHILS # BLD AUTO: 0.02 K/UL — SIGNIFICANT CHANGE UP (ref 0–0.2)
BASOPHILS NFR BLD AUTO: 0.2 % — SIGNIFICANT CHANGE UP (ref 0–2)
BUN SERPL-MCNC: 25 MG/DL — HIGH (ref 7–23)
CALCIUM SERPL-MCNC: 10 MG/DL — SIGNIFICANT CHANGE UP (ref 8.4–10.5)
CHLORIDE SERPL-SCNC: 100 MMOL/L — SIGNIFICANT CHANGE UP (ref 96–108)
CO2 SERPL-SCNC: 27 MMOL/L — SIGNIFICANT CHANGE UP (ref 22–31)
CREAT SERPL-MCNC: 1.04 MG/DL — SIGNIFICANT CHANGE UP (ref 0.5–1.3)
EOSINOPHIL # BLD AUTO: 0.4 K/UL — SIGNIFICANT CHANGE UP (ref 0–0.5)
EOSINOPHIL NFR BLD AUTO: 4.5 % — SIGNIFICANT CHANGE UP (ref 0–6)
GLUCOSE SERPL-MCNC: 95 MG/DL — SIGNIFICANT CHANGE UP (ref 70–99)
HCT VFR BLD CALC: 29.5 % — LOW (ref 39–50)
HCT VFR BLD CALC: 30 % — LOW (ref 39–50)
HGB BLD-MCNC: 9.7 G/DL — LOW (ref 13–17)
HGB BLD-MCNC: 9.7 G/DL — LOW (ref 13–17)
IMM GRANULOCYTES NFR BLD AUTO: 0.4 % — SIGNIFICANT CHANGE UP (ref 0–1.5)
LYMPHOCYTES # BLD AUTO: 1.19 K/UL — SIGNIFICANT CHANGE UP (ref 1–3.3)
LYMPHOCYTES # BLD AUTO: 13.3 % — SIGNIFICANT CHANGE UP (ref 13–44)
MAGNESIUM SERPL-MCNC: 2.1 MG/DL — SIGNIFICANT CHANGE UP (ref 1.6–2.6)
MCHC RBC-ENTMCNC: 32 PG — SIGNIFICANT CHANGE UP (ref 27–34)
MCHC RBC-ENTMCNC: 32.3 GM/DL — SIGNIFICANT CHANGE UP (ref 32–36)
MCHC RBC-ENTMCNC: 32.3 PG — SIGNIFICANT CHANGE UP (ref 27–34)
MCHC RBC-ENTMCNC: 32.9 GM/DL — SIGNIFICANT CHANGE UP (ref 32–36)
MCV RBC AUTO: 98.3 FL — SIGNIFICANT CHANGE UP (ref 80–100)
MCV RBC AUTO: 99 FL — SIGNIFICANT CHANGE UP (ref 80–100)
MONOCYTES # BLD AUTO: 1.34 K/UL — HIGH (ref 0–0.9)
MONOCYTES NFR BLD AUTO: 14.9 % — HIGH (ref 2–14)
NEUTROPHILS # BLD AUTO: 5.98 K/UL — SIGNIFICANT CHANGE UP (ref 1.8–7.4)
NEUTROPHILS NFR BLD AUTO: 66.7 % — SIGNIFICANT CHANGE UP (ref 43–77)
NRBC # BLD: 0 /100 WBCS — SIGNIFICANT CHANGE UP (ref 0–0)
NRBC # BLD: 0 /100 WBCS — SIGNIFICANT CHANGE UP (ref 0–0)
PHOSPHATE SERPL-MCNC: 2.2 MG/DL — LOW (ref 2.5–4.5)
PLATELET # BLD AUTO: 139 K/UL — LOW (ref 150–400)
PLATELET # BLD AUTO: 144 K/UL — LOW (ref 150–400)
POTASSIUM SERPL-MCNC: 4.8 MMOL/L — SIGNIFICANT CHANGE UP (ref 3.5–5.3)
POTASSIUM SERPL-SCNC: 4.8 MMOL/L — SIGNIFICANT CHANGE UP (ref 3.5–5.3)
RBC # BLD: 3 M/UL — LOW (ref 4.2–5.8)
RBC # BLD: 3.03 M/UL — LOW (ref 4.2–5.8)
RBC # FLD: 14 % — SIGNIFICANT CHANGE UP (ref 10.3–14.5)
RBC # FLD: 14 % — SIGNIFICANT CHANGE UP (ref 10.3–14.5)
SODIUM SERPL-SCNC: 137 MMOL/L — SIGNIFICANT CHANGE UP (ref 135–145)
WBC # BLD: 8.97 K/UL — SIGNIFICANT CHANGE UP (ref 3.8–10.5)
WBC # BLD: 9.46 K/UL — SIGNIFICANT CHANGE UP (ref 3.8–10.5)
WBC # FLD AUTO: 8.97 K/UL — SIGNIFICANT CHANGE UP (ref 3.8–10.5)
WBC # FLD AUTO: 9.46 K/UL — SIGNIFICANT CHANGE UP (ref 3.8–10.5)

## 2019-11-29 PROCEDURE — 99233 SBSQ HOSP IP/OBS HIGH 50: CPT | Mod: GC

## 2019-11-29 RX ORDER — METOPROLOL TARTRATE 50 MG
25 TABLET ORAL DAILY
Refills: 0 | Status: DISCONTINUED | OUTPATIENT
Start: 2019-11-29 | End: 2019-11-30

## 2019-11-29 RX ORDER — ACETAMINOPHEN 500 MG
650 TABLET ORAL EVERY 6 HOURS
Refills: 0 | Status: DISCONTINUED | OUTPATIENT
Start: 2019-11-29 | End: 2019-11-30

## 2019-11-29 RX ORDER — ASPIRIN/CALCIUM CARB/MAGNESIUM 324 MG
81 TABLET ORAL DAILY
Refills: 0 | Status: DISCONTINUED | OUTPATIENT
Start: 2019-11-29 | End: 2019-11-29

## 2019-11-29 RX ORDER — TAMSULOSIN HYDROCHLORIDE 0.4 MG/1
0.4 CAPSULE ORAL AT BEDTIME
Refills: 0 | Status: DISCONTINUED | OUTPATIENT
Start: 2019-11-29 | End: 2019-11-30

## 2019-11-29 RX ORDER — SODIUM,POTASSIUM PHOSPHATES 278-250MG
1 POWDER IN PACKET (EA) ORAL ONCE
Refills: 0 | Status: COMPLETED | OUTPATIENT
Start: 2019-11-29 | End: 2019-11-29

## 2019-11-29 RX ORDER — APIXABAN 2.5 MG/1
1 TABLET, FILM COATED ORAL
Qty: 0 | Refills: 0 | DISCHARGE

## 2019-11-29 RX ORDER — FINASTERIDE 5 MG/1
5 TABLET, FILM COATED ORAL DAILY
Refills: 0 | Status: DISCONTINUED | OUTPATIENT
Start: 2019-11-29 | End: 2019-11-30

## 2019-11-29 RX ADMIN — DONEPEZIL HYDROCHLORIDE 5 MILLIGRAM(S): 10 TABLET, FILM COATED ORAL at 21:26

## 2019-11-29 RX ADMIN — ATORVASTATIN CALCIUM 40 MILLIGRAM(S): 80 TABLET, FILM COATED ORAL at 21:26

## 2019-11-29 RX ADMIN — MEMANTINE HYDROCHLORIDE 10 MILLIGRAM(S): 10 TABLET ORAL at 17:54

## 2019-11-29 RX ADMIN — Medication 1 MILLIGRAM(S): at 17:54

## 2019-11-29 RX ADMIN — Medication 975 MILLIGRAM(S): at 06:56

## 2019-11-29 RX ADMIN — DONEPEZIL HYDROCHLORIDE 5 MILLIGRAM(S): 10 TABLET, FILM COATED ORAL at 10:29

## 2019-11-29 RX ADMIN — Medication 975 MILLIGRAM(S): at 21:29

## 2019-11-29 RX ADMIN — FINASTERIDE 5 MILLIGRAM(S): 5 TABLET, FILM COATED ORAL at 14:13

## 2019-11-29 RX ADMIN — POLYETHYLENE GLYCOL 3350 17 GRAM(S): 17 POWDER, FOR SOLUTION ORAL at 14:10

## 2019-11-29 RX ADMIN — Medication 975 MILLIGRAM(S): at 21:26

## 2019-11-29 RX ADMIN — Medication 975 MILLIGRAM(S): at 14:11

## 2019-11-29 RX ADMIN — Medication 25 MILLIGRAM(S): at 14:15

## 2019-11-29 RX ADMIN — MEMANTINE HYDROCHLORIDE 10 MILLIGRAM(S): 10 TABLET ORAL at 06:56

## 2019-11-29 RX ADMIN — Medication 1 MILLIGRAM(S): at 06:56

## 2019-11-29 RX ADMIN — TAMSULOSIN HYDROCHLORIDE 0.4 MILLIGRAM(S): 0.4 CAPSULE ORAL at 21:26

## 2019-11-29 RX ADMIN — Medication 1 TABLET(S): at 14:11

## 2019-11-29 RX ADMIN — Medication 1 PACKET(S): at 10:30

## 2019-11-29 NOTE — PROGRESS NOTE ADULT - SUBJECTIVE AND OBJECTIVE BOX
Rigo Diaz PGY 1  Internal Medicine  [id number]      Progress Note:    Patient is a 89y old  Male who presents with a chief complaint of R Iliac Fracture, RP bleed (28 Nov 2019 08:12)      SUBJECTIVE / OVERNIGHT EVENTS: No major events. PT recommending subacute rehab. Hb stable.     ADDITIONAL REVIEW OF SYSTEMS:    MEDICATIONS  (STANDING):  acetaminophen   Tablet .. 975 milliGRAM(s) Oral every 8 hours  atorvastatin 40 milliGRAM(s) Oral at bedtime  cyclobenzaprine 5 milliGRAM(s) Oral once  donepezil 5 milliGRAM(s) Oral <User Schedule>  memantine 10 milliGRAM(s) Oral two times a day  multivitamin 1 Tablet(s) Oral daily  predniSONE   Tablet 1 milliGRAM(s) Oral two times a day    MEDICATIONS  (PRN):  lidocaine   Patch 1 Patch Transdermal every 24 hours PRN pain  polyethylene glycol 3350 17 Gram(s) Oral daily PRN Constipation  senna 2 Tablet(s) Oral at bedtime PRN Constipation      CAPILLARY BLOOD GLUCOSE        I&O's Summary    27 Nov 2019 07:01  -  28 Nov 2019 07:00  --------------------------------------------------------  IN: 500 mL / OUT: 200 mL / NET: 300 mL    28 Nov 2019 07:01  -  29 Nov 2019 06:39  --------------------------------------------------------  IN: 0 mL / OUT: 1101 mL / NET: -1101 mL        PHYSICAL EXAM:  Vital Signs Last 24 Hrs  T(C): 36.6 (29 Nov 2019 06:25), Max: 36.6 (28 Nov 2019 13:55)  T(F): 97.8 (29 Nov 2019 06:25), Max: 97.9 (28 Nov 2019 13:55)  HR: 84 (29 Nov 2019 06:25) (72 - 84)  BP: 125/64 (29 Nov 2019 06:25) (94/46 - 125/64)  BP(mean): --  RR: 18 (29 Nov 2019 06:25) (16 - 18)  SpO2: 95% (29 Nov 2019 06:25) (95% - 98%)    CONSTITUTIONAL: NAD, well-developed, resting comfortably   HEENT: NC/AT, PERRLA, EOMI, oropharnyx clear, no LAD  CV: RRR, normal S1,S2; soft systolic murmur  RESPIRATORY: Normal respiratory effort; lungs CTAB, no crackles, rhonchi, or wheezes  ABDOMEN: soft, non-tender to palpation, non-distended, normal bowel sounds, no rebound/guarding  EXTREMITIES: No lower extremity edema; peripheral pulses are 2+ bilaterally  MUSCULOSKELETAL: right LE warm, well-perfused, mild tenderness to palpation over anterior hip, no skin changes; unable to flex leg due to pain; R UE non-tender, normal ROM  NEURO: nonfocal, strength grossly intact  PSYCH: A+O to person, place, and time; affect appropriate  SKIN: warm, dry          LABS:                        9.7    7.96  )-----------( 138      ( 28 Nov 2019 18:51 )             29.6     11-28    138  |  102  |  26<H>  ----------------------------<  92  4.8   |  23  |  1.00    Ca    10.2      28 Nov 2019 09:44  Phos  2.5     11-28  Mg     2.1     11-28    TPro  7.0  /  Alb  4.1  /  TBili  0.3  /  DBili  x   /  AST  37  /  ALT  20  /  AlkPhos  86  11-27    PT/INR - ( 27 Nov 2019 19:58 )   PT: 12.8 sec;   INR: 1.12 ratio         PTT - ( 27 Nov 2019 19:58 )  PTT:23.4 sec            Telemetry:       RADIOLOGY & ADDITIONAL TESTS: Rigo Diaz PGY 1  Internal Medicine  [id number]      Progress Note:    Patient is a 89y old  Male who presents with a chief complaint of R Iliac Fracture, RP bleed (28 Nov 2019 08:12)      SUBJECTIVE / OVERNIGHT EVENTS: No major events. PT recommending subacute rehab. Hb stable. Denies any pain today while in bed, asking to try walking today. No chest pain or SOB.     ADDITIONAL REVIEW OF SYSTEMS:    MEDICATIONS  (STANDING):  acetaminophen   Tablet .. 975 milliGRAM(s) Oral every 8 hours  atorvastatin 40 milliGRAM(s) Oral at bedtime  cyclobenzaprine 5 milliGRAM(s) Oral once  donepezil 5 milliGRAM(s) Oral <User Schedule>  memantine 10 milliGRAM(s) Oral two times a day  multivitamin 1 Tablet(s) Oral daily  predniSONE   Tablet 1 milliGRAM(s) Oral two times a day    MEDICATIONS  (PRN):  lidocaine   Patch 1 Patch Transdermal every 24 hours PRN pain  polyethylene glycol 3350 17 Gram(s) Oral daily PRN Constipation  senna 2 Tablet(s) Oral at bedtime PRN Constipation      CAPILLARY BLOOD GLUCOSE        I&O's Summary    27 Nov 2019 07:01  -  28 Nov 2019 07:00  --------------------------------------------------------  IN: 500 mL / OUT: 200 mL / NET: 300 mL    28 Nov 2019 07:01  -  29 Nov 2019 06:39  --------------------------------------------------------  IN: 0 mL / OUT: 1101 mL / NET: -1101 mL        PHYSICAL EXAM:  Vital Signs Last 24 Hrs  T(C): 36.6 (29 Nov 2019 06:25), Max: 36.6 (28 Nov 2019 13:55)  T(F): 97.8 (29 Nov 2019 06:25), Max: 97.9 (28 Nov 2019 13:55)  HR: 84 (29 Nov 2019 06:25) (72 - 84)  BP: 125/64 (29 Nov 2019 06:25) (94/46 - 125/64)  BP(mean): --  RR: 18 (29 Nov 2019 06:25) (16 - 18)  SpO2: 95% (29 Nov 2019 06:25) (95% - 98%)    CONSTITUTIONAL: NAD, well-developed, resting comfortably   HEENT: NC/AT, PERRLA, EOMI, oropharnyx clear, no LAD  CV: RRR, normal S1,S2; soft systolic murmur  RESPIRATORY: Normal respiratory effort; lungs CTAB, no crackles, rhonchi, or wheezes  ABDOMEN: soft, non-tender to palpation, non-distended, normal bowel sounds, no rebound/guarding  EXTREMITIES: No lower extremity edema; peripheral pulses are 2+ bilaterally  MUSCULOSKELETAL: right LE warm, well-perfused, mild tenderness to palpation over anterior hip, small ecchymosis over lateral hip, able to flex leg ~30 degrees; R UE non-tender, normal ROM  NEURO: nonfocal, strength grossly intact  PSYCH: A+O to person, place, and time; affect appropriate  SKIN: warm, dry          LABS:                        9.7    7.96  )-----------( 138      ( 28 Nov 2019 18:51 )             29.6     11-28    138  |  102  |  26<H>  ----------------------------<  92  4.8   |  23  |  1.00    Ca    10.2      28 Nov 2019 09:44  Phos  2.5     11-28  Mg     2.1     11-28    TPro  7.0  /  Alb  4.1  /  TBili  0.3  /  DBili  x   /  AST  37  /  ALT  20  /  AlkPhos  86  11-27    PT/INR - ( 27 Nov 2019 19:58 )   PT: 12.8 sec;   INR: 1.12 ratio         PTT - ( 27 Nov 2019 19:58 )  PTT:23.4 sec            Telemetry:       RADIOLOGY & ADDITIONAL TESTS:

## 2019-11-29 NOTE — DISCHARGE NOTE PROVIDER - HOSPITAL COURSE
89 y/o M with PMH afib on eliquis, CABG, PPM, HTN, BPH, and mild dementia who presents to the hospital s/p mechanical fall. He was found to have a comminuted fracture of the right anterior iliac bone and RP hematoma. Orthopedic surgery was consulted and recommended no acute intervention, weight-bearing as tolerated. Pain was well controlled with standing tylenol. PT recommended subacute rehab. Eliquis was held given the RP bleed and Hb trended. Hb remained stable  ~9.5-10, down from recent baseline ~12, likely due to acute bleeding episode. Per conversation with wife and family, decision was made to stop Eliquis 2.5 mg bid on discharge, as risks likely outweigh the benefits, considering his multiple falls in the past year. Pt will follow up with his PCP and cardiologist.        He is being discharged home in stable condition. 89 y/o M with PMH afib on eliquis, CABG, PPM, HTN, BPH, and mild dementia who presents to the hospital s/p mechanical fall. He was found to have a comminuted fracture of the right anterior iliac bone and RP hematoma. Orthopedic surgery was consulted and recommended no acute intervention, weight-bearing as tolerated. Pain was well controlled with standing tylenol. PT recommended subacute rehab. Eliquis was held given the RP bleed and Hb trended. Hb remained stable  ~9.5-10, down from recent baseline ~12, likely due to acute bleeding episode. Per conversation with wife and family, decision was made to stop Eliquis 2.5 mg bid on discharge, as risks likely outweigh the benefits, considering his multiple falls in the past year. Pt will follow up with his PCP and cardiologist.        He is being discharged rehab in stable condition.

## 2019-11-29 NOTE — DISCHARGE NOTE PROVIDER - CARE PROVIDERS DIRECT ADDRESSES
[email or web address]

## 2019-11-29 NOTE — DISCHARGE NOTE PROVIDER - NSDCMRMEDTOKEN_GEN_ALL_CORE_FT
donepezil 5 mg oral tablet: 1 tab(s) orally 2 times a day  finasteride 5 mg oral tablet: 1 tab(s) orally once a day  memantine 10 mg oral tablet: 1 tab(s) orally 2 times a day  metoprolol succinate 25 mg oral tablet, extended release: 1 tab(s) orally once a day  MiraLax oral powder for reconstitution:   predniSONE 1 mg oral tablet: 1 tab(s) orally 2 times a day  quinapril 10 mg oral tablet: 1 tab(s) orally once a day  rosuvastatin 20 mg oral tablet: 1 tab(s) orally once a day  tamsulosin 0.4 mg oral capsule: 1 cap(s) orally once a day donepezil 5 mg oral tablet: 1 tab(s) orally 2 times a day  finasteride 5 mg oral tablet: 1 tab(s) orally once a day  memantine 10 mg oral tablet: 1 tab(s) orally 2 times a day  metoprolol succinate 25 mg oral tablet, extended release: 1 tab(s) orally once a day  MiraLax oral powder for reconstitution:   predniSONE 1 mg oral tablet: 1 tab(s) orally 2 times a day  rosuvastatin 20 mg oral tablet: 1 tab(s) orally once a day  tamsulosin 0.4 mg oral capsule: 1 cap(s) orally once a day

## 2019-11-29 NOTE — PROGRESS NOTE ADULT - PROBLEM SELECTOR PLAN 4
- [id number] 3  - patient on eliquis 2.5 mg bid at home  - rate controlled at this time and rhythm controlled as well  - holding home metoprolol succinate 25mg to avoid masking tachycardia that could indicate active bleeding, will re-start tomorrow, if hgb remains stable  - patient now with three falls within the last year --> will discuss with patient and family about benefits vs risks of being on AC - [id number] 3  - patient on eliquis 2.5 mg bid at home  - rate controlled at this time and rhythm controlled as well  - restart metoprolol succinate 25mg today, as Hb remains stable  - patient now with three falls within the last year --> will discuss with patient and family about benefits vs risks of being on AC - [id number] 3  - patient on eliquis 2.5 mg bid at home  - rate and r  - restart metoprolol succinate 25mg today, as Hb remains stable  - patient now with three falls within the last year --> will discuss with patient and family about benefits vs risks of being on AC - [id number] 3  - patient on eliquis 2.5 mg bid at home  - restart metoprolol succinate 25mg today, as Hb remains stable  - patient now with three falls within the last year --> will discuss with patient and family about benefits vs risks of being on AC

## 2019-11-29 NOTE — DISCHARGE NOTE PROVIDER - NSDCCPGOAL_GEN_ALL_CORE_FT
To get better and follow your care plan as instructed. Please seek medical care if you are feeling lightheaded or dizzy, or you are having chest pain or SOB.

## 2019-11-29 NOTE — PROGRESS NOTE ADULT - PROBLEM SELECTOR PLAN 1
- presents s/p mechanical fall with R comminuted fracture of the anterior iliac  - appreciate ortho recs --> no surgical intervention needed for this type of fracture, WBAT  - pain control with 975mg tylenol q8h standing --> patient currently not endorsing any pain at rest in bed  - can consider using small dose oxycodone or tramadol for severe pain or prior to working with PT for severe pain  - PT consulted  - Fall precautions - of note, this is his second mechanical fall this month - presents s/p mechanical fall with R comminuted fracture of the anterior iliac  - appreciate ortho recs --> no surgical intervention needed for this type of fracture, WBAT  - pain control with 975mg tylenol q8h standing --> patient currently not endorsing any pain at rest in bed  - can consider using small dose oxycodone or tramadol for severe pain or prior to working with PT for severe pain  - PT consulted -> RONI  - Fall precautions - of note, this is his second mechanical fall this month - presents s/p mechanical fall with R comminuted fracture of the anterior iliac  - appreciate ortho recs --> no surgical intervention needed for this type of fracture, WBAT  - pain control with 975mg tylenol q8h standing again today (switch to prn tmrw likely)--> patient currently not endorsing any pain at rest in bed  - can consider using small dose oxycodone or tramadol for severe pain or prior to working with PT for severe pain  - PT consulted -> RONI  - Fall precautions - of note, this is his second mechanical fall this month

## 2019-11-29 NOTE — PROGRESS NOTE ADULT - PROBLEM SELECTOR PLAN 5
- on quinapril 10 mg and metoprolol succ 25 mg at home  - will hold BP meds given normotensive and c/f for bleeding. - on quinapril 10 mg and metoprolol succ 25 mg at home  - restart metop today

## 2019-11-29 NOTE — PROGRESS NOTE ADULT - PROBLEM SELECTOR PLAN 10
1.  Name of PCP: Judson Murrieta   2.  PCP Contacted on Admission: [ ] Y    [X ] N    3.  PCP contacted at Discharge: [ ] Y    [ ] N    [ ] N/A  4.  Post-Discharge Appointment Date and Location:  5.  Summary of Handoff given to PCP:

## 2019-11-29 NOTE — PROGRESS NOTE ADULT - PROBLEM SELECTOR PLAN 2
- patient with evidence of RP hematoma on CT of the pelvis  - Hgb has dropped from last ED visit 2 weeks ago from 12.7 --> 10.5 --> 9.6, now stable ~9.5.   -HDS, no longer tachycardic  -CBC q12h hours  -maintain active type and screen  -repeat imaging if Hb downtrending, transfuse for Hb < 7 - patient with evidence of RP hematoma on CT of the pelvis  - Hgb has dropped from last ED visit 2 weeks ago from 12.7 --> 10.5 --> 9.6, now stable ~9.5.   -HDS, no longer tachycardic  -CBC q12h hours for now  -maintain active type and screen  -repeat imaging if Hb downtrending, transfuse for Hb < 7

## 2019-11-29 NOTE — DISCHARGE NOTE PROVIDER - CARE PROVIDER_API CALL
Judson Murrieta (MD)  Gastroenterology; Internal Medicine  [address]  [place] [address]  Phone: [phone]  Fax: [phone]  Follow Up Time:

## 2019-11-29 NOTE — PROGRESS NOTE ADULT - ASSESSMENT
89 y/o M with PMH afib on eliquis, CABG x5, PPM, HTN, BPH, and dementia who presents to the hospital s/p mechanical fall, admitted for R comminuted iliac fracture complicated by RP hematoma.

## 2019-11-29 NOTE — PROGRESS NOTE ADULT - PROBLEM SELECTOR PLAN 7
- patient on finasteride and tamsulosin at home for BPH  - no signs of retention at this time  - plan to restart today if BP stable - patient on finasteride and tamsulosin at home for BPH  - no signs of retention at this time  - restart home meds today now that Hb and BP stable

## 2019-11-29 NOTE — PROGRESS NOTE ADULT - PROBLEM SELECTOR PLAN 3
- patient with acute blood loss anemia secondary to new RP hematoma as a result of recent fall while on blood thinners  - normocytic anemia and in this clinical scenario, secondary to blood loss  - maintain active type and screen, transfuse if actively bleeding or Hgb < 7 - patient with acute blood loss anemia secondary to new RP hematoma as a result of recent fall while on blood thinners  - normocytic anemia and in this clinical scenario, secondary to blood loss  - maintain active type and screen, transfuse if actively bleeding or Hgb < 7  -CBC q12h for now

## 2019-11-29 NOTE — DISCHARGE NOTE PROVIDER - NSDCCPCAREPLAN_GEN_ALL_CORE_FT
PRINCIPAL DISCHARGE DIAGNOSIS  Diagnosis: Fall with injury  Assessment and Plan of Treatment: You were found to have a fracture of one of your pelvic bones. Orthopedics recommended no acute intervention. There are no limitations on your movement. Please use a walker at all time to prevent future falls. You are being discharged to rehab.      SECONDARY DISCHARGE DIAGNOSES  Diagnosis: Retroperitoneal bleed  Assessment and Plan of Treatment: You have a large bleed behind your right hip which caused a hematoma to form. Your blood counts dropped initially but then stabilized. You were taking a blood thinner, which likely caused you to bleed much more than you would have. Because of the risks of continuing to take blood thinners while at high risk for falls, please stop taking Eliquis. You should discuss this decision with your primary care doctor and cardiologist.

## 2019-11-30 ENCOUNTER — TRANSCRIPTION ENCOUNTER (OUTPATIENT)
Age: 84
End: 2019-11-30

## 2019-11-30 VITALS
OXYGEN SATURATION: 96 % | SYSTOLIC BLOOD PRESSURE: 106 MMHG | RESPIRATION RATE: 18 BRPM | TEMPERATURE: 98 F | DIASTOLIC BLOOD PRESSURE: 69 MMHG | HEART RATE: 89 BPM

## 2019-11-30 PROBLEM — Z87.39 PERSONAL HISTORY OF OTHER DISEASES OF THE MUSCULOSKELETAL SYSTEM AND CONNECTIVE TISSUE: Chronic | Status: ACTIVE | Noted: 2019-11-28

## 2019-11-30 PROCEDURE — 86901 BLOOD TYPING SEROLOGIC RH(D): CPT

## 2019-11-30 PROCEDURE — 76377 3D RENDER W/INTRP POSTPROCES: CPT

## 2019-11-30 PROCEDURE — 84100 ASSAY OF PHOSPHORUS: CPT

## 2019-11-30 PROCEDURE — 99239 HOSP IP/OBS DSCHRG MGMT >30: CPT

## 2019-11-30 PROCEDURE — 85610 PROTHROMBIN TIME: CPT

## 2019-11-30 PROCEDURE — 73502 X-RAY EXAM HIP UNI 2-3 VIEWS: CPT

## 2019-11-30 PROCEDURE — 99285 EMERGENCY DEPT VISIT HI MDM: CPT

## 2019-11-30 PROCEDURE — 73552 X-RAY EXAM OF FEMUR 2/>: CPT

## 2019-11-30 PROCEDURE — 80048 BASIC METABOLIC PNL TOTAL CA: CPT

## 2019-11-30 PROCEDURE — 85730 THROMBOPLASTIN TIME PARTIAL: CPT

## 2019-11-30 PROCEDURE — 86850 RBC ANTIBODY SCREEN: CPT

## 2019-11-30 PROCEDURE — 97530 THERAPEUTIC ACTIVITIES: CPT

## 2019-11-30 PROCEDURE — 80053 COMPREHEN METABOLIC PANEL: CPT

## 2019-11-30 PROCEDURE — 82306 VITAMIN D 25 HYDROXY: CPT

## 2019-11-30 PROCEDURE — 83735 ASSAY OF MAGNESIUM: CPT

## 2019-11-30 PROCEDURE — 85027 COMPLETE CBC AUTOMATED: CPT

## 2019-11-30 PROCEDURE — 72192 CT PELVIS W/O DYE: CPT

## 2019-11-30 PROCEDURE — 93005 ELECTROCARDIOGRAM TRACING: CPT

## 2019-11-30 PROCEDURE — 71045 X-RAY EXAM CHEST 1 VIEW: CPT

## 2019-11-30 PROCEDURE — 97162 PT EVAL MOD COMPLEX 30 MIN: CPT

## 2019-11-30 PROCEDURE — 86900 BLOOD TYPING SEROLOGIC ABO: CPT

## 2019-11-30 PROCEDURE — 97110 THERAPEUTIC EXERCISES: CPT

## 2019-11-30 RX ORDER — POLYETHYLENE GLYCOL 3350 17 G/17G
17 POWDER, FOR SOLUTION ORAL DAILY
Refills: 0 | Status: DISCONTINUED | OUTPATIENT
Start: 2019-11-30 | End: 2019-11-30

## 2019-11-30 RX ORDER — QUINAPRIL HYDROCHLORIDE 40 MG/1
1 TABLET, FILM COATED ORAL
Qty: 0 | Refills: 0 | DISCHARGE

## 2019-11-30 RX ADMIN — Medication 25 MILLIGRAM(S): at 06:33

## 2019-11-30 RX ADMIN — Medication 1 MILLIGRAM(S): at 06:33

## 2019-11-30 RX ADMIN — MEMANTINE HYDROCHLORIDE 10 MILLIGRAM(S): 10 TABLET ORAL at 06:33

## 2019-11-30 RX ADMIN — DONEPEZIL HYDROCHLORIDE 5 MILLIGRAM(S): 10 TABLET, FILM COATED ORAL at 09:27

## 2019-11-30 NOTE — PROGRESS NOTE ADULT - SUBJECTIVE AND OBJECTIVE BOX
Adryan Morrison  Resident Physician   Pager [phone] or [phone] from 7am to 7pm, after 7pm page night float   Patient is a 89y old  Male who presents with a chief complaint of R Iliac Fracture, RP bleed (29 Nov 2019 22:23)    SUBJECTIVE / OVERNIGHT EVENTS: ADDITIONAL REVIEW OF SYSTEMS:  No acute overnight events. Patient denied fever, chills, sob, nausea, vomiting, diarrhea. Last bowel movement yesterday as per RN.     MEDICATIONS  (STANDING):  atorvastatin 40 milliGRAM(s) Oral at bedtime  cyclobenzaprine 5 milliGRAM(s) Oral once  donepezil 5 milliGRAM(s) Oral <User Schedule>  finasteride 5 milliGRAM(s) Oral daily  memantine 10 milliGRAM(s) Oral two times a day  metoprolol succinate ER 25 milliGRAM(s) Oral daily  multivitamin 1 Tablet(s) Oral daily  predniSONE   Tablet 1 milliGRAM(s) Oral two times a day  tamsulosin 0.4 milliGRAM(s) Oral at bedtime    MEDICATIONS  (PRN):  acetaminophen   Tablet .. 650 milliGRAM(s) Oral every 6 hours PRN Mild Pain (1 - 3), Moderate Pain (4 - 6), Severe Pain (7 - 10)  lidocaine   Patch 1 Patch Transdermal every 24 hours PRN pain  polyethylene glycol 3350 17 Gram(s) Oral daily PRN Constipation  senna 2 Tablet(s) Oral at bedtime PRN Constipation    CAPILLARY BLOOD GLUCOSE    I&O's Summary    PHYSICAL EXAM:  Vital Signs Last 24 Hrs  T(C): 36.6 (30 Nov 2019 06:28), Max: 36.7 (29 Nov 2019 10:45)  T(F): 97.9 (30 Nov 2019 06:28), Max: 98.1 (29 Nov 2019 10:45)  HR: 88 (30 Nov 2019 06:28) (81 - 99)  BP: 119/75 (30 Nov 2019 06:28) (97/62 - 134/80)  RR: 16 (30 Nov 2019 06:28) (16 - 18)  SpO2: 95% (30 Nov 2019 06:28) (95% - 100%)    Physical Examination:   CONSTITUTIONAL: NAD, well-developed, resting comfortably   HEENT: NC/AT, EOMI, oropharnyx clear, no LAD  CV: RRR, normal S1,S2; soft systolic murmur  RESPIRATORY: Normal respiratory effort; lungs CTAB, no crackles, rhonchi, or wheezes  ABDOMEN: soft, non-tender to palpation, non-distended, normal bowel sounds, no rebound/guarding  EXTREMITIES: No lower extremity edema; peripheral pulses are 2+ bilaterally  MUSCULOSKELETAL: right LE warm, well-perfused, mild tenderness to palpation over anterior hip, no skin changes; unable to flex leg due to pain; R UE non-tender, normal ROM  NEURO: nonfocal, strength grossly intact  PSYCH: A+O to person, place; affect appropriate  SKIN: warm, dry    LABS:                        9.7    9.46  )-----------( 139      ( 29 Nov 2019 19:07 )             29.5     11-29    137  |  100  |  25<H>  ----------------------------<  95  4.8   |  27  |  1.04    Ca    10.0      29 Nov 2019 07:27  Phos  2.2     11-29  Mg     2.1     11-29        RADIOLOGY & ADDITIONAL TESTS:  Results Reviewed:   Imaging Personally Reviewed:  Electrocardiogram Personally Reviewed:    COORDINATION OF CARE:  Care Discussed with Consultants/Other Providers [Y/N]:  Prior or Outpatient Records Reviewed [Y/N]:

## 2019-11-30 NOTE — PROGRESS NOTE ADULT - ATTENDING COMMENTS
Patient seen and examined. Patient planned for discharge today, discharge planning 40 minutes. D/w Patient and wife yesterday regarding a/c and patient w/ recurrent falls and now RP bleed, would recommend discontinuing a/c, given risks of bleeding. Patient and wife agree, will continue to hold  a/c on discharge. Wife states PMD had talked to them previously regarding wanting to discontinue a/c due to recurrent falls. PMD emailed by resident on discharge regarding update.
Patient seen and examined. D/w patient and wife, that we will reach out to his cardiologist regarding a/c as he has had multiple falls and w/ now RP bleed, at risk for another bleed.
Patient seen and examined. Patient states he has some cramping in the lower legs, left< right, which he believes is from staying in one place. Will add magnesium and phos to labs, replete as needed. PT pending. continue to monitor hgb. Patient w/ no focal neurological deficits. Had complaints of shoulder pain earlier, but now resolved with full ROM.

## 2019-11-30 NOTE — PROGRESS NOTE ADULT - PROBLEM SELECTOR PLAN 6
- Oriented to person, place (hospital starts with an N), month and year.  - continue memantine and donepezil

## 2019-11-30 NOTE — DISCHARGE NOTE NURSING/CASE MANAGEMENT/SOCIAL WORK - PATIENT PORTAL LINK FT
You can access the FollowMyHealth Patient Portal offered by [facility] by registering at the following website: [email or web address]. By joining [employer]’s FollowMyHealth portal, you will also be able to view your health information using other applications (apps) compatible with our system.

## 2019-11-30 NOTE — PROGRESS NOTE ADULT - PROBLEM SELECTOR PLAN 9
- DVT ppx: SCDs  - DASH diet  - Dispo: subacute rehab
- DVT ppx: SCDs  - DASH diet  - Dispo: subacute rehab
- DVT ppx: SCDs  - DASH diet  - Dispo pending PT recs

## 2019-11-30 NOTE — PROGRESS NOTE ADULT - PROBLEM SELECTOR PLAN 4
- [id number] 3  - patient on eliquis 2.5 mg bid at home  - restart metoprolol succinate 25mg today, as Hb remains stable  - patient now with three falls within the last year --> will discuss with patient and family about benefits vs risks of being on AC

## 2019-11-30 NOTE — PROGRESS NOTE ADULT - PROBLEM SELECTOR PLAN 8
- per patients wife, has been on chronic prednisone 1mg daily for many years for diagnosis of polymyalgia rheumatica. Per wife, patient was sleeping more throughout the day and prednisone dose was recently increased to 1mg BID (within the last few weeks)  - will continue while in the hospital

## 2019-11-30 NOTE — PROGRESS NOTE ADULT - PROBLEM SELECTOR PLAN 10
1.  Name of PCP: Judson Murrieta   2.  PCP Contacted on Admission: [ ] Y    [X ] N    3.  PCP contacted at Discharge: [ ] Y    [ ] N    [ ] N/A  4.  Post-Discharge Appointment Date and Location:  5.  Summary of Handoff given to PCP: 1.  Name of PCP: Judson Murrieta   2.  PCP Contacted on Admission: [ x] Y    [X ] N    3.  PCP contacted at Discharge: [ x] Y    [ ] N    [ ] N/A Emailed.   4.  Post-Discharge Appointment Date and Location: D  5.  Summary of Handoff given to PCP: Emailed

## 2019-11-30 NOTE — PROGRESS NOTE ADULT - PROBLEM SELECTOR PLAN 3
- patient with acute blood loss anemia secondary to new RP hematoma as a result of recent fall while on blood thinners  - normocytic anemia and in this clinical scenario, secondary to blood loss  - maintain active type and screen, transfuse if actively bleeding or Hgb < 7  -CBC q12h for now  patient and wife, that we will reach out to his cardiologist regarding a/c as he has had multiple falls and w/ now RP bleed, at risk for another bleed.

## 2019-11-30 NOTE — PROGRESS NOTE ADULT - PROBLEM SELECTOR PLAN 7
- patient on finasteride and tamsulosin at home for BPH  - no signs of retention at this time  - restart home meds today now that Hb and BP stable - patient on finasteride and tamsulosin at home for BPH  - no signs of retention at this time

## 2019-11-30 NOTE — PROGRESS NOTE ADULT - ASSESSMENT
89 y/o M with PMH afib on eliquis, CABG x5, PPM, HTN, BPH, and dementia who presents to the hospital s/p mechanical fall, admitted for R comminuted iliac fracture complicated by RP hematoma.

## 2019-11-30 NOTE — PROGRESS NOTE ADULT - PROBLEM SELECTOR PLAN 2
- patient with evidence of RP hematoma on CT of the pelvis  - Hgb has dropped from last ED visit 2 weeks ago from 12.7 --> 10.5 --> 9.6, now stable ~9.5.   -HDS, no longer tachycardic  -CBC q12h hours for now  -maintain active type and screen  -repeat imaging if Hb downtrending, transfuse for Hb < 7

## 2019-11-30 NOTE — PROGRESS NOTE ADULT - PROBLEM SELECTOR PLAN 1
- presents s/p mechanical fall with R comminuted fracture of the anterior iliac  - appreciate ortho recs --> no surgical intervention needed for this type of fracture, WBAT  - pain control with 975mg tylenol q8h standing again today (switch to prn tmrw likely)--> patient currently not endorsing any pain at rest in bed  - can consider using small dose oxycodone or tramadol for severe pain or prior to working with PT for severe pain  - PT consulted -> RONI today.   - Fall precautions - of note, this is his second mechanical fall this month

## 2019-12-11 ENCOUNTER — APPOINTMENT (OUTPATIENT)
Dept: INTERNAL MEDICINE | Facility: CLINIC | Age: 84
End: 2019-12-11

## 2020-01-01 ENCOUNTER — APPOINTMENT (OUTPATIENT)
Dept: INTERNAL MEDICINE | Facility: CLINIC | Age: 85
End: 2020-01-01

## 2020-01-01 ENCOUNTER — NON-APPOINTMENT (OUTPATIENT)
Age: 85
End: 2020-01-01

## 2020-01-01 ENCOUNTER — TRANSCRIPTION ENCOUNTER (OUTPATIENT)
Age: 85
End: 2020-01-01

## 2020-01-01 ENCOUNTER — APPOINTMENT (OUTPATIENT)
Dept: ORTHOPEDIC SURGERY | Facility: CLINIC | Age: 85
End: 2020-01-01
Payer: MEDICARE

## 2020-01-01 ENCOUNTER — RX RENEWAL (OUTPATIENT)
Age: 85
End: 2020-01-01

## 2020-01-01 ENCOUNTER — EMERGENCY (EMERGENCY)
Facility: HOSPITAL | Age: 85
LOS: 1 days | Discharge: LEFT BEFORE DISCHARGE | End: 2020-01-01
Attending: EMERGENCY MEDICINE
Payer: MEDICARE

## 2020-01-01 ENCOUNTER — APPOINTMENT (OUTPATIENT)
Dept: INTERNAL MEDICINE | Facility: CLINIC | Age: 85
End: 2020-01-01
Payer: MEDICARE

## 2020-01-01 ENCOUNTER — INPATIENT (INPATIENT)
Facility: HOSPITAL | Age: 85
LOS: 6 days | Discharge: NURSING, REHAB OR LONG-TERM CARE | DRG: 964 | End: 2020-09-09
Attending: SURGERY | Admitting: SURGERY
Payer: MEDICARE

## 2020-01-01 VITALS
SYSTOLIC BLOOD PRESSURE: 110 MMHG | RESPIRATION RATE: 21 BRPM | HEART RATE: 99 BPM | OXYGEN SATURATION: 96 % | DIASTOLIC BLOOD PRESSURE: 78 MMHG

## 2020-01-01 VITALS
BODY MASS INDEX: 23.04 KG/M2 | DIASTOLIC BLOOD PRESSURE: 65 MMHG | SYSTOLIC BLOOD PRESSURE: 117 MMHG | WEIGHT: 152 LBS | TEMPERATURE: 97 F | HEART RATE: 88 BPM | HEIGHT: 68 IN

## 2020-01-01 VITALS
DIASTOLIC BLOOD PRESSURE: 51 MMHG | TEMPERATURE: 97.6 F | HEART RATE: 76 BPM | WEIGHT: 152 LBS | HEIGHT: 68 IN | BODY MASS INDEX: 23.04 KG/M2 | SYSTOLIC BLOOD PRESSURE: 105 MMHG

## 2020-01-01 VITALS
WEIGHT: 169.98 LBS | RESPIRATION RATE: 20 BRPM | SYSTOLIC BLOOD PRESSURE: 113 MMHG | HEIGHT: 72 IN | DIASTOLIC BLOOD PRESSURE: 63 MMHG | HEART RATE: 103 BPM

## 2020-01-01 VITALS
SYSTOLIC BLOOD PRESSURE: 136 MMHG | TEMPERATURE: 97.7 F | BODY MASS INDEX: 22.81 KG/M2 | DIASTOLIC BLOOD PRESSURE: 77 MMHG | WEIGHT: 150 LBS | RESPIRATION RATE: 15 BRPM | HEART RATE: 108 BPM | OXYGEN SATURATION: 95 %

## 2020-01-01 VITALS
RESPIRATION RATE: 18 BRPM | WEIGHT: 149.91 LBS | OXYGEN SATURATION: 94 % | HEART RATE: 72 BPM | HEIGHT: 72 IN | SYSTOLIC BLOOD PRESSURE: 127 MMHG | DIASTOLIC BLOOD PRESSURE: 84 MMHG | TEMPERATURE: 99 F

## 2020-01-01 VITALS
SYSTOLIC BLOOD PRESSURE: 132 MMHG | HEART RATE: 99 BPM | DIASTOLIC BLOOD PRESSURE: 66 MMHG | HEIGHT: 68 IN | BODY MASS INDEX: 23.04 KG/M2 | TEMPERATURE: 97.2 F | WEIGHT: 152 LBS

## 2020-01-01 VITALS
SYSTOLIC BLOOD PRESSURE: 123 MMHG | OXYGEN SATURATION: 99 % | DIASTOLIC BLOOD PRESSURE: 70 MMHG | RESPIRATION RATE: 20 BRPM | HEART RATE: 94 BPM

## 2020-01-01 DIAGNOSIS — Z11.59 ENCOUNTER FOR SCREENING FOR OTHER VIRAL DISEASES: ICD-10-CM

## 2020-01-01 DIAGNOSIS — F03.90 UNSPECIFIED DEMENTIA W/OUT BEHAVIORAL DISTURBANCE: ICD-10-CM

## 2020-01-01 DIAGNOSIS — T07.XXXA UNSPECIFIED MULTIPLE INJURIES, INITIAL ENCOUNTER: ICD-10-CM

## 2020-01-01 DIAGNOSIS — S42.022D DISPLACED FRACTURE OF SHAFT OF LEFT CLAVICLE, SUBSEQUENT ENCOUNTER FOR FRACTURE WITH ROUTINE HEALING: ICD-10-CM

## 2020-01-01 DIAGNOSIS — Z23 ENCOUNTER FOR IMMUNIZATION: ICD-10-CM

## 2020-01-01 DIAGNOSIS — Z74.09 OTHER REDUCED MOBILITY: ICD-10-CM

## 2020-01-01 DIAGNOSIS — I10 ESSENTIAL (PRIMARY) HYPERTENSION: ICD-10-CM

## 2020-01-01 DIAGNOSIS — Z95.1 PRESENCE OF AORTOCORONARY BYPASS GRAFT: Chronic | ICD-10-CM

## 2020-01-01 DIAGNOSIS — S42.022A DISPLACED FRACTURE OF SHAFT OF LEFT CLAVICLE, INITIAL ENCOUNTER FOR CLOSED FRACTURE: ICD-10-CM

## 2020-01-01 DIAGNOSIS — T14.8XXA OTHER INJURY OF UNSPECIFIED BODY REGION, INITIAL ENCOUNTER: ICD-10-CM

## 2020-01-01 DIAGNOSIS — S22.39XA FRACTURE OF ONE RIB, UNSPECIFIED SIDE, INITIAL ENCOUNTER FOR CLOSED FRACTURE: ICD-10-CM

## 2020-01-01 DIAGNOSIS — M35.3 POLYMYALGIA RHEUMATICA: ICD-10-CM

## 2020-01-01 DIAGNOSIS — Z29.9 ENCOUNTER FOR PROPHYLACTIC MEASURES, UNSPECIFIED: ICD-10-CM

## 2020-01-01 DIAGNOSIS — R29.6 REPEATED FALLS: ICD-10-CM

## 2020-01-01 DIAGNOSIS — R26.81 UNSTEADINESS ON FEET: ICD-10-CM

## 2020-01-01 DIAGNOSIS — Z71.89 OTHER SPECIFIED COUNSELING: ICD-10-CM

## 2020-01-01 DIAGNOSIS — E78.5 HYPERLIPIDEMIA, UNSPECIFIED: ICD-10-CM

## 2020-01-01 DIAGNOSIS — Z95.0 PRESENCE OF CARDIAC PACEMAKER: Chronic | ICD-10-CM

## 2020-01-01 DIAGNOSIS — I25.10 ATHEROSCLEROTIC HEART DISEASE OF NATIVE CORONARY ARTERY W/OUT ANGINA PECTORIS: ICD-10-CM

## 2020-01-01 DIAGNOSIS — M89.8X1 OTHER SPECIFIED DISORDERS OF BONE, SHOULDER: ICD-10-CM

## 2020-01-01 DIAGNOSIS — N40.0 BENIGN PROSTATIC HYPERPLASIA WITHOUT LOWER URINARY TRACT SYMPMS: ICD-10-CM

## 2020-01-01 DIAGNOSIS — N40.0 BENIGN PROSTATIC HYPERPLASIA WITHOUT LOWER URINARY TRACT SYMPTOMS: ICD-10-CM

## 2020-01-01 DIAGNOSIS — I48.91 UNSPECIFIED ATRIAL FIBRILLATION: ICD-10-CM

## 2020-01-01 DIAGNOSIS — L97.409 NON-PRESSURE CHRONIC ULCER OF UNSPECIFIED HEEL AND MIDFOOT WITH UNSPECIFIED SEVERITY: ICD-10-CM

## 2020-01-01 DIAGNOSIS — G30.1 ALZHEIMER'S DISEASE WITH LATE ONSET: ICD-10-CM

## 2020-01-01 DIAGNOSIS — I25.10 ATHEROSCLEROTIC HEART DISEASE OF NATIVE CORONARY ARTERY WITHOUT ANGINA PECTORIS: ICD-10-CM

## 2020-01-01 DIAGNOSIS — I48.11 LONGSTANDING PERSISTENT ATRIAL FIBRILLATION: ICD-10-CM

## 2020-01-01 LAB
25(OH)D3 SERPL-MCNC: 42 NG/ML
ALBUMIN SERPL ELPH-MCNC: 3.6 G/DL — SIGNIFICANT CHANGE UP (ref 3.3–5)
ALBUMIN SERPL ELPH-MCNC: 3.7 G/DL — SIGNIFICANT CHANGE UP (ref 3.3–5)
ALBUMIN SERPL ELPH-MCNC: 3.9 G/DL
ALP BLD-CCNC: 118 U/L
ALP SERPL-CCNC: 159 U/L — HIGH (ref 40–120)
ALP SERPL-CCNC: 83 U/L — SIGNIFICANT CHANGE UP (ref 40–120)
ALT FLD-CCNC: 32 U/L — SIGNIFICANT CHANGE UP (ref 10–45)
ALT FLD-CCNC: 42 U/L — SIGNIFICANT CHANGE UP (ref 10–45)
ALT SERPL-CCNC: 25 U/L
ANION GAP SERPL CALC-SCNC: 10 MMOL/L — SIGNIFICANT CHANGE UP (ref 5–17)
ANION GAP SERPL CALC-SCNC: 10 MMOL/L — SIGNIFICANT CHANGE UP (ref 5–17)
ANION GAP SERPL CALC-SCNC: 11 MMOL/L
ANION GAP SERPL CALC-SCNC: 12 MMOL/L — SIGNIFICANT CHANGE UP (ref 5–17)
ANION GAP SERPL CALC-SCNC: 13 MMOL/L — SIGNIFICANT CHANGE UP (ref 5–17)
ANION GAP SERPL CALC-SCNC: 18 MMOL/L — HIGH (ref 5–17)
ANION GAP SERPL CALC-SCNC: 8 MMOL/L — SIGNIFICANT CHANGE UP (ref 5–17)
ANION GAP SERPL CALC-SCNC: 8 MMOL/L — SIGNIFICANT CHANGE UP (ref 5–17)
APPEARANCE UR: CLEAR — SIGNIFICANT CHANGE UP
APTT BLD: 20 SEC — LOW (ref 27.5–35.5)
APTT BLD: 21.7 SEC — LOW (ref 27.5–35.5)
APTT BLD: 22.5 SEC — LOW (ref 27.5–35.5)
APTT BLD: 23.8 SEC — LOW (ref 27.5–35.5)
APTT BLD: 24.1 SEC — LOW (ref 27.5–35.5)
APTT BLD: 25.8 SEC — LOW (ref 27.5–35.5)
AST SERPL-CCNC: 34 U/L
AST SERPL-CCNC: 35 U/L — SIGNIFICANT CHANGE UP (ref 10–40)
AST SERPL-CCNC: 43 U/L — HIGH (ref 10–40)
BACTERIA # UR AUTO: NEGATIVE — SIGNIFICANT CHANGE UP
BASE EXCESS BLDV CALC-SCNC: 3.4 MMOL/L — HIGH (ref -2–2)
BASE EXCESS BLDV CALC-SCNC: 4.3 MMOL/L — HIGH (ref -2–2)
BASE EXCESS BLDV CALC-SCNC: 4.3 MMOL/L — HIGH (ref -2–2)
BASE EXCESS BLDV CALC-SCNC: 4.7 MMOL/L — HIGH (ref -2–2)
BASOPHILS # BLD AUTO: 0.02 K/UL — SIGNIFICANT CHANGE UP (ref 0–0.2)
BASOPHILS # BLD AUTO: 0.02 K/UL — SIGNIFICANT CHANGE UP (ref 0–0.2)
BASOPHILS # BLD AUTO: 0.04 K/UL
BASOPHILS NFR BLD AUTO: 0.2 % — SIGNIFICANT CHANGE UP (ref 0–2)
BASOPHILS NFR BLD AUTO: 0.2 % — SIGNIFICANT CHANGE UP (ref 0–2)
BASOPHILS NFR BLD AUTO: 0.6 %
BILIRUB SERPL-MCNC: 0.3 MG/DL
BILIRUB SERPL-MCNC: 0.4 MG/DL — SIGNIFICANT CHANGE UP (ref 0.2–1.2)
BILIRUB SERPL-MCNC: 0.6 MG/DL — SIGNIFICANT CHANGE UP (ref 0.2–1.2)
BILIRUB UR QL STRIP: NEGATIVE
BILIRUB UR-MCNC: NEGATIVE — SIGNIFICANT CHANGE UP
BLD GP AB SCN SERPL QL: NEGATIVE — SIGNIFICANT CHANGE UP
BLD GP AB SCN SERPL QL: NEGATIVE — SIGNIFICANT CHANGE UP
BUN SERPL-MCNC: 23 MG/DL — SIGNIFICANT CHANGE UP (ref 7–23)
BUN SERPL-MCNC: 29 MG/DL
BUN SERPL-MCNC: 30 MG/DL — HIGH (ref 7–23)
BUN SERPL-MCNC: 30 MG/DL — HIGH (ref 7–23)
BUN SERPL-MCNC: 32 MG/DL — HIGH (ref 7–23)
BUN SERPL-MCNC: 32 MG/DL — HIGH (ref 7–23)
BUN SERPL-MCNC: 36 MG/DL — HIGH (ref 7–23)
BUN SERPL-MCNC: 37 MG/DL — HIGH (ref 7–23)
CA-I SERPL-SCNC: 1.3 MMOL/L — SIGNIFICANT CHANGE UP (ref 1.12–1.3)
CA-I SERPL-SCNC: 1.32 MMOL/L — HIGH (ref 1.12–1.3)
CA-I SERPL-SCNC: 1.32 MMOL/L — HIGH (ref 1.12–1.3)
CA-I SERPL-SCNC: 1.33 MMOL/L — HIGH (ref 1.12–1.3)
CALCIUM SERPL-MCNC: 10 MG/DL — SIGNIFICANT CHANGE UP (ref 8.4–10.5)
CALCIUM SERPL-MCNC: 10.3 MG/DL
CALCIUM SERPL-MCNC: 10.3 MG/DL — SIGNIFICANT CHANGE UP (ref 8.4–10.5)
CALCIUM SERPL-MCNC: 11.1 MG/DL — HIGH (ref 8.4–10.5)
CALCIUM SERPL-MCNC: 9.5 MG/DL — SIGNIFICANT CHANGE UP (ref 8.4–10.5)
CALCIUM SERPL-MCNC: 9.5 MG/DL — SIGNIFICANT CHANGE UP (ref 8.4–10.5)
CALCIUM SERPL-MCNC: 9.7 MG/DL — SIGNIFICANT CHANGE UP (ref 8.4–10.5)
CALCIUM SERPL-MCNC: 9.8 MG/DL — SIGNIFICANT CHANGE UP (ref 8.4–10.5)
CHLORIDE BLDV-SCNC: 109 MMOL/L — HIGH (ref 96–108)
CHLORIDE BLDV-SCNC: 110 MMOL/L — HIGH (ref 96–108)
CHLORIDE BLDV-SCNC: 110 MMOL/L — HIGH (ref 96–108)
CHLORIDE BLDV-SCNC: 111 MMOL/L — HIGH (ref 96–108)
CHLORIDE SERPL-SCNC: 104 MMOL/L
CHLORIDE SERPL-SCNC: 104 MMOL/L — SIGNIFICANT CHANGE UP (ref 96–108)
CHLORIDE SERPL-SCNC: 106 MMOL/L — SIGNIFICANT CHANGE UP (ref 96–108)
CHLORIDE SERPL-SCNC: 107 MMOL/L — SIGNIFICANT CHANGE UP (ref 96–108)
CHLORIDE SERPL-SCNC: 107 MMOL/L — SIGNIFICANT CHANGE UP (ref 96–108)
CHLORIDE SERPL-SCNC: 108 MMOL/L — SIGNIFICANT CHANGE UP (ref 96–108)
CHLORIDE SERPL-SCNC: 110 MMOL/L — HIGH (ref 96–108)
CHLORIDE SERPL-SCNC: 99 MMOL/L — SIGNIFICANT CHANGE UP (ref 96–108)
CHOLEST SERPL-MCNC: 122 MG/DL
CHOLEST/HDLC SERPL: 2 RATIO
CO2 BLDV-SCNC: 29 MMOL/L — SIGNIFICANT CHANGE UP (ref 22–30)
CO2 BLDV-SCNC: 30 MMOL/L — SIGNIFICANT CHANGE UP (ref 22–30)
CO2 BLDV-SCNC: 31 MMOL/L — HIGH (ref 22–30)
CO2 BLDV-SCNC: 31 MMOL/L — HIGH (ref 22–30)
CO2 SERPL-SCNC: 21 MMOL/L — LOW (ref 22–31)
CO2 SERPL-SCNC: 22 MMOL/L — SIGNIFICANT CHANGE UP (ref 22–31)
CO2 SERPL-SCNC: 23 MMOL/L — SIGNIFICANT CHANGE UP (ref 22–31)
CO2 SERPL-SCNC: 24 MMOL/L — SIGNIFICANT CHANGE UP (ref 22–31)
CO2 SERPL-SCNC: 24 MMOL/L — SIGNIFICANT CHANGE UP (ref 22–31)
CO2 SERPL-SCNC: 25 MMOL/L — SIGNIFICANT CHANGE UP (ref 22–31)
CO2 SERPL-SCNC: 26 MMOL/L
CO2 SERPL-SCNC: 26 MMOL/L — SIGNIFICANT CHANGE UP (ref 22–31)
COLOR SPEC: SIGNIFICANT CHANGE UP
CREAT SERPL-MCNC: 0.81 MG/DL — SIGNIFICANT CHANGE UP (ref 0.5–1.3)
CREAT SERPL-MCNC: 0.85 MG/DL — SIGNIFICANT CHANGE UP (ref 0.5–1.3)
CREAT SERPL-MCNC: 0.93 MG/DL — SIGNIFICANT CHANGE UP (ref 0.5–1.3)
CREAT SERPL-MCNC: 0.93 MG/DL — SIGNIFICANT CHANGE UP (ref 0.5–1.3)
CREAT SERPL-MCNC: 0.99 MG/DL — SIGNIFICANT CHANGE UP (ref 0.5–1.3)
CREAT SERPL-MCNC: 1 MG/DL — SIGNIFICANT CHANGE UP (ref 0.5–1.3)
CREAT SERPL-MCNC: 1.08 MG/DL — SIGNIFICANT CHANGE UP (ref 0.5–1.3)
CREAT SERPL-MCNC: 1.1 MG/DL
CRP SERPL-MCNC: <0.1 MG/DL
CULTURE RESULTS: SIGNIFICANT CHANGE UP
DIFF PNL FLD: NEGATIVE — SIGNIFICANT CHANGE UP
EOSINOPHIL # BLD AUTO: 0.01 K/UL — SIGNIFICANT CHANGE UP (ref 0–0.5)
EOSINOPHIL # BLD AUTO: 0.09 K/UL — SIGNIFICANT CHANGE UP (ref 0–0.5)
EOSINOPHIL # BLD AUTO: 0.41 K/UL
EOSINOPHIL NFR BLD AUTO: 0.1 % — SIGNIFICANT CHANGE UP (ref 0–6)
EOSINOPHIL NFR BLD AUTO: 0.9 % — SIGNIFICANT CHANGE UP (ref 0–6)
EOSINOPHIL NFR BLD AUTO: 6.4 %
EPI CELLS # UR: 1 /HPF — SIGNIFICANT CHANGE UP
ERYTHROCYTE [SEDIMENTATION RATE] IN BLOOD BY WESTERGREN METHOD: 52 MM/HR
ESTIMATED AVERAGE GLUCOSE: 123 MG/DL
GAS PNL BLDV: 138 MMOL/L — SIGNIFICANT CHANGE UP (ref 135–145)
GAS PNL BLDV: 140 MMOL/L — SIGNIFICANT CHANGE UP (ref 135–145)
GAS PNL BLDV: 142 MMOL/L — SIGNIFICANT CHANGE UP (ref 135–145)
GAS PNL BLDV: 142 MMOL/L — SIGNIFICANT CHANGE UP (ref 135–145)
GAS PNL BLDV: SIGNIFICANT CHANGE UP
GLUCOSE BLDV-MCNC: 100 MG/DL — HIGH (ref 70–99)
GLUCOSE BLDV-MCNC: 101 MG/DL — HIGH (ref 70–99)
GLUCOSE BLDV-MCNC: 114 MG/DL — HIGH (ref 70–99)
GLUCOSE BLDV-MCNC: 115 MG/DL — HIGH (ref 70–99)
GLUCOSE SERPL-MCNC: 103 MG/DL — HIGH (ref 70–99)
GLUCOSE SERPL-MCNC: 104 MG/DL — HIGH (ref 70–99)
GLUCOSE SERPL-MCNC: 106 MG/DL — HIGH (ref 70–99)
GLUCOSE SERPL-MCNC: 119 MG/DL — HIGH (ref 70–99)
GLUCOSE SERPL-MCNC: 122 MG/DL — HIGH (ref 70–99)
GLUCOSE SERPL-MCNC: 150 MG/DL — HIGH (ref 70–99)
GLUCOSE SERPL-MCNC: 150 MG/DL — HIGH (ref 70–99)
GLUCOSE SERPL-MCNC: 88 MG/DL
GLUCOSE UR QL: NEGATIVE — SIGNIFICANT CHANGE UP
GLUCOSE UR-MCNC: NEGATIVE
HBA1C MFR BLD HPLC: 5.9 %
HCG UR QL: 0.2 EU/DL
HCO3 BLDV-SCNC: 28 MMOL/L — SIGNIFICANT CHANGE UP (ref 21–29)
HCO3 BLDV-SCNC: 29 MMOL/L — SIGNIFICANT CHANGE UP (ref 21–29)
HCO3 BLDV-SCNC: 29 MMOL/L — SIGNIFICANT CHANGE UP (ref 21–29)
HCO3 BLDV-SCNC: 30 MMOL/L — HIGH (ref 21–29)
HCT VFR BLD CALC: 15.2 % — CRITICAL LOW (ref 39–50)
HCT VFR BLD CALC: 25.2 % — LOW (ref 39–50)
HCT VFR BLD CALC: 25.6 % — LOW (ref 39–50)
HCT VFR BLD CALC: 26.1 % — LOW (ref 39–50)
HCT VFR BLD CALC: 26.4 % — LOW (ref 39–50)
HCT VFR BLD CALC: 26.5 % — LOW (ref 39–50)
HCT VFR BLD CALC: 27.1 % — LOW (ref 39–50)
HCT VFR BLD CALC: 27.7 % — LOW (ref 39–50)
HCT VFR BLD CALC: 29.5 % — LOW (ref 39–50)
HCT VFR BLD CALC: 31.3 % — LOW (ref 39–50)
HCT VFR BLD CALC: 35.1 %
HCT VFR BLD CALC: 37.8 % — LOW (ref 39–50)
HCT VFR BLDA CALC: 26 % — LOW (ref 39–50)
HCT VFR BLDA CALC: 27 % — LOW (ref 39–50)
HCT VFR BLDA CALC: 29 % — LOW (ref 39–50)
HCT VFR BLDA CALC: 30 % — LOW (ref 39–50)
HDLC SERPL-MCNC: 61 MG/DL
HGB BLD CALC-MCNC: 8.4 G/DL — LOW (ref 13–17)
HGB BLD CALC-MCNC: 8.6 G/DL — LOW (ref 13–17)
HGB BLD CALC-MCNC: 9.4 G/DL — LOW (ref 13–17)
HGB BLD CALC-MCNC: 9.6 G/DL — LOW (ref 13–17)
HGB BLD-MCNC: 11 G/DL
HGB BLD-MCNC: 11.3 G/DL — LOW (ref 13–17)
HGB BLD-MCNC: 4.4 G/DL — CRITICAL LOW (ref 13–17)
HGB BLD-MCNC: 7.8 G/DL — LOW (ref 13–17)
HGB BLD-MCNC: 8.1 G/DL — LOW (ref 13–17)
HGB BLD-MCNC: 8.2 G/DL — LOW (ref 13–17)
HGB BLD-MCNC: 8.2 G/DL — LOW (ref 13–17)
HGB BLD-MCNC: 8.3 G/DL — LOW (ref 13–17)
HGB BLD-MCNC: 8.4 G/DL — LOW (ref 13–17)
HGB BLD-MCNC: 8.7 G/DL — LOW (ref 13–17)
HGB BLD-MCNC: 9.4 G/DL — LOW (ref 13–17)
HGB BLD-MCNC: 9.7 G/DL — LOW (ref 13–17)
HGB UR QL STRIP.AUTO: NEGATIVE
HOROWITZ INDEX BLDV+IHG-RTO: 21 — SIGNIFICANT CHANGE UP
HYALINE CASTS # UR AUTO: 4 /LPF — HIGH (ref 0–2)
IMM GRANULOCYTES NFR BLD AUTO: 0.3 %
IMM GRANULOCYTES NFR BLD AUTO: 0.9 % — SIGNIFICANT CHANGE UP (ref 0–1.5)
IMM GRANULOCYTES NFR BLD AUTO: 0.9 % — SIGNIFICANT CHANGE UP (ref 0–1.5)
INR BLD: 1 RATIO — SIGNIFICANT CHANGE UP (ref 0.88–1.16)
INR BLD: 1.07 RATIO — SIGNIFICANT CHANGE UP (ref 0.88–1.16)
INR BLD: 1.1 RATIO — SIGNIFICANT CHANGE UP (ref 0.88–1.16)
INR BLD: 1.11 RATIO — SIGNIFICANT CHANGE UP (ref 0.88–1.16)
INR BLD: 1.12 RATIO — SIGNIFICANT CHANGE UP (ref 0.88–1.16)
INR BLD: 1.12 RATIO — SIGNIFICANT CHANGE UP (ref 0.88–1.16)
INR BLD: 1.16 RATIO — SIGNIFICANT CHANGE UP (ref 0.88–1.16)
KETONES UR-MCNC: NEGATIVE
KETONES UR-MCNC: NEGATIVE — SIGNIFICANT CHANGE UP
LACTATE BLDV-MCNC: 1 MMOL/L — SIGNIFICANT CHANGE UP (ref 0.7–2)
LACTATE BLDV-MCNC: 1.1 MMOL/L — SIGNIFICANT CHANGE UP (ref 0.7–2)
LACTATE BLDV-MCNC: 1.3 MMOL/L — SIGNIFICANT CHANGE UP (ref 0.7–2)
LACTATE BLDV-MCNC: 1.6 MMOL/L — SIGNIFICANT CHANGE UP (ref 0.7–2)
LACTATE BLDV-MCNC: 1.7 MMOL/L — SIGNIFICANT CHANGE UP (ref 0.7–2)
LDLC SERPL CALC-MCNC: 53 MG/DL
LEUKOCYTE ESTERASE UR QL STRIP: NEGATIVE
LEUKOCYTE ESTERASE UR-ACNC: NEGATIVE — SIGNIFICANT CHANGE UP
LIDOCAIN IGE QN: 49 U/L — SIGNIFICANT CHANGE UP (ref 7–60)
LYMPHOCYTES # BLD AUTO: 0.63 K/UL — LOW (ref 1–3.3)
LYMPHOCYTES # BLD AUTO: 1.33 K/UL
LYMPHOCYTES # BLD AUTO: 1.88 K/UL — SIGNIFICANT CHANGE UP (ref 1–3.3)
LYMPHOCYTES # BLD AUTO: 19.4 % — SIGNIFICANT CHANGE UP (ref 13–44)
LYMPHOCYTES # BLD AUTO: 5.4 % — LOW (ref 13–44)
LYMPHOCYTES NFR BLD AUTO: 20.7 %
MAGNESIUM SERPL-MCNC: 2.1 MG/DL — SIGNIFICANT CHANGE UP (ref 1.6–2.6)
MAGNESIUM SERPL-MCNC: 2.1 MG/DL — SIGNIFICANT CHANGE UP (ref 1.6–2.6)
MAGNESIUM SERPL-MCNC: 2.2 MG/DL — SIGNIFICANT CHANGE UP (ref 1.6–2.6)
MAN DIFF?: NORMAL
MCHC RBC-ENTMCNC: 28.9 GM/DL — LOW (ref 32–36)
MCHC RBC-ENTMCNC: 29.9 GM/DL — LOW (ref 32–36)
MCHC RBC-ENTMCNC: 29.9 PG — SIGNIFICANT CHANGE UP (ref 27–34)
MCHC RBC-ENTMCNC: 31 GM/DL — LOW (ref 32–36)
MCHC RBC-ENTMCNC: 31.1 GM/DL — LOW (ref 32–36)
MCHC RBC-ENTMCNC: 31.3 GM/DL
MCHC RBC-ENTMCNC: 31.3 GM/DL — LOW (ref 32–36)
MCHC RBC-ENTMCNC: 31.4 GM/DL — LOW (ref 32–36)
MCHC RBC-ENTMCNC: 31.5 PG — SIGNIFICANT CHANGE UP (ref 27–34)
MCHC RBC-ENTMCNC: 31.5 PG — SIGNIFICANT CHANGE UP (ref 27–34)
MCHC RBC-ENTMCNC: 31.6 PG — SIGNIFICANT CHANGE UP (ref 27–34)
MCHC RBC-ENTMCNC: 31.6 PG — SIGNIFICANT CHANGE UP (ref 27–34)
MCHC RBC-ENTMCNC: 31.9 GM/DL — LOW (ref 32–36)
MCHC RBC-ENTMCNC: 31.9 PG
MCHC RBC-ENTMCNC: 31.9 PG — SIGNIFICANT CHANGE UP (ref 27–34)
MCHC RBC-ENTMCNC: 32 GM/DL — SIGNIFICANT CHANGE UP (ref 32–36)
MCHC RBC-ENTMCNC: 32 PG — SIGNIFICANT CHANGE UP (ref 27–34)
MCHC RBC-ENTMCNC: 32.1 PG — SIGNIFICANT CHANGE UP (ref 27–34)
MCHC RBC-ENTMCNC: 32.2 PG — SIGNIFICANT CHANGE UP (ref 27–34)
MCHC RBC-ENTMCNC: 32.4 PG — SIGNIFICANT CHANGE UP (ref 27–34)
MCHC RBC-ENTMCNC: 32.7 PG — SIGNIFICANT CHANGE UP (ref 27–34)
MCV RBC AUTO: 100 FL — SIGNIFICANT CHANGE UP (ref 80–100)
MCV RBC AUTO: 101.6 FL — HIGH (ref 80–100)
MCV RBC AUTO: 101.6 FL — HIGH (ref 80–100)
MCV RBC AUTO: 101.7 FL
MCV RBC AUTO: 101.7 FL — HIGH (ref 80–100)
MCV RBC AUTO: 101.8 FL — HIGH (ref 80–100)
MCV RBC AUTO: 101.9 FL — HIGH (ref 80–100)
MCV RBC AUTO: 102 FL — HIGH (ref 80–100)
MCV RBC AUTO: 102 FL — HIGH (ref 80–100)
MCV RBC AUTO: 102.7 FL — HIGH (ref 80–100)
MCV RBC AUTO: 102.7 FL — HIGH (ref 80–100)
MCV RBC AUTO: 110.9 FL — HIGH (ref 80–100)
MONOCYTES # BLD AUTO: 0.69 K/UL — SIGNIFICANT CHANGE UP (ref 0–0.9)
MONOCYTES # BLD AUTO: 0.84 K/UL
MONOCYTES # BLD AUTO: 1.5 K/UL — HIGH (ref 0–0.9)
MONOCYTES NFR BLD AUTO: 13 % — SIGNIFICANT CHANGE UP (ref 2–14)
MONOCYTES NFR BLD AUTO: 13.1 %
MONOCYTES NFR BLD AUTO: 7.1 % — SIGNIFICANT CHANGE UP (ref 2–14)
NEUTROPHILS # BLD AUTO: 3.77 K/UL
NEUTROPHILS # BLD AUTO: 6.91 K/UL — SIGNIFICANT CHANGE UP (ref 1.8–7.4)
NEUTROPHILS # BLD AUTO: 9.3 K/UL — HIGH (ref 1.8–7.4)
NEUTROPHILS NFR BLD AUTO: 58.9 %
NEUTROPHILS NFR BLD AUTO: 71.5 % — SIGNIFICANT CHANGE UP (ref 43–77)
NEUTROPHILS NFR BLD AUTO: 80.4 % — HIGH (ref 43–77)
NITRITE UR QL STRIP: NEGATIVE
NITRITE UR-MCNC: NEGATIVE — SIGNIFICANT CHANGE UP
NRBC # BLD: 0 /100 WBCS — SIGNIFICANT CHANGE UP (ref 0–0)
NT-PROBNP SERPL-SCNC: 1178 PG/ML — HIGH (ref 0–300)
OB PNL STL: NEGATIVE — SIGNIFICANT CHANGE UP
OTHER CELLS CSF MANUAL: 5 ML/DL — LOW (ref 18–23)
OTHER CELLS CSF MANUAL: 8 ML/DL — LOW (ref 18–23)
PCO2 BLDV: 44 MMHG — SIGNIFICANT CHANGE UP (ref 35–50)
PCO2 BLDV: 45 MMHG — SIGNIFICANT CHANGE UP (ref 35–50)
PCO2 BLDV: 48 MMHG — SIGNIFICANT CHANGE UP (ref 35–50)
PCO2 BLDV: 48 MMHG — SIGNIFICANT CHANGE UP (ref 35–50)
PH BLDV: 7.4 — SIGNIFICANT CHANGE UP (ref 7.35–7.45)
PH BLDV: 7.4 — SIGNIFICANT CHANGE UP (ref 7.35–7.45)
PH BLDV: 7.41 — SIGNIFICANT CHANGE UP (ref 7.35–7.45)
PH BLDV: 7.43 — SIGNIFICANT CHANGE UP (ref 7.35–7.45)
PH UR STRIP: 6.5
PH UR: 6.5 — SIGNIFICANT CHANGE UP (ref 5–8)
PHOSPHATE SERPL-MCNC: 2.1 MG/DL — LOW (ref 2.5–4.5)
PHOSPHATE SERPL-MCNC: 2.4 MG/DL — LOW (ref 2.5–4.5)
PHOSPHATE SERPL-MCNC: 2.5 MG/DL — SIGNIFICANT CHANGE UP (ref 2.5–4.5)
PHOSPHATE SERPL-MCNC: 2.6 MG/DL — SIGNIFICANT CHANGE UP (ref 2.5–4.5)
PHOSPHATE SERPL-MCNC: 3 MG/DL — SIGNIFICANT CHANGE UP (ref 2.5–4.5)
PLATELET # BLD AUTO: 115 K/UL — LOW (ref 150–400)
PLATELET # BLD AUTO: 116 K/UL — LOW (ref 150–400)
PLATELET # BLD AUTO: 123 K/UL — LOW (ref 150–400)
PLATELET # BLD AUTO: 127 K/UL — LOW (ref 150–400)
PLATELET # BLD AUTO: 130 K/UL — LOW (ref 150–400)
PLATELET # BLD AUTO: 137 K/UL — LOW (ref 150–400)
PLATELET # BLD AUTO: 147 K/UL — LOW (ref 150–400)
PLATELET # BLD AUTO: 151 K/UL — SIGNIFICANT CHANGE UP (ref 150–400)
PLATELET # BLD AUTO: 157 K/UL
PLATELET # BLD AUTO: 167 K/UL — SIGNIFICANT CHANGE UP (ref 150–400)
PLATELET # BLD AUTO: 174 K/UL — SIGNIFICANT CHANGE UP (ref 150–400)
PLATELET # BLD AUTO: 63 K/UL — LOW (ref 150–400)
PO2 BLDV: 25 MMHG — SIGNIFICANT CHANGE UP (ref 25–45)
PO2 BLDV: 25 MMHG — SIGNIFICANT CHANGE UP (ref 25–45)
PO2 BLDV: 27 MMHG — SIGNIFICANT CHANGE UP (ref 25–45)
PO2 BLDV: 33 MMHG — SIGNIFICANT CHANGE UP (ref 25–45)
POTASSIUM BLDV-SCNC: 4.2 MMOL/L — SIGNIFICANT CHANGE UP (ref 3.5–5.3)
POTASSIUM BLDV-SCNC: 4.2 MMOL/L — SIGNIFICANT CHANGE UP (ref 3.5–5.3)
POTASSIUM BLDV-SCNC: 4.4 MMOL/L — SIGNIFICANT CHANGE UP (ref 3.5–5.3)
POTASSIUM BLDV-SCNC: 4.5 MMOL/L — SIGNIFICANT CHANGE UP (ref 3.5–5.3)
POTASSIUM SERPL-MCNC: 4.3 MMOL/L — SIGNIFICANT CHANGE UP (ref 3.5–5.3)
POTASSIUM SERPL-MCNC: 4.4 MMOL/L — SIGNIFICANT CHANGE UP (ref 3.5–5.3)
POTASSIUM SERPL-MCNC: 4.5 MMOL/L — SIGNIFICANT CHANGE UP (ref 3.5–5.3)
POTASSIUM SERPL-MCNC: 4.7 MMOL/L — SIGNIFICANT CHANGE UP (ref 3.5–5.3)
POTASSIUM SERPL-MCNC: 4.8 MMOL/L — SIGNIFICANT CHANGE UP (ref 3.5–5.3)
POTASSIUM SERPL-MCNC: 4.8 MMOL/L — SIGNIFICANT CHANGE UP (ref 3.5–5.3)
POTASSIUM SERPL-MCNC: 4.9 MMOL/L — SIGNIFICANT CHANGE UP (ref 3.5–5.3)
POTASSIUM SERPL-SCNC: 4.3 MMOL/L — SIGNIFICANT CHANGE UP (ref 3.5–5.3)
POTASSIUM SERPL-SCNC: 4.4 MMOL/L — SIGNIFICANT CHANGE UP (ref 3.5–5.3)
POTASSIUM SERPL-SCNC: 4.5 MMOL/L — SIGNIFICANT CHANGE UP (ref 3.5–5.3)
POTASSIUM SERPL-SCNC: 4.7 MMOL/L — SIGNIFICANT CHANGE UP (ref 3.5–5.3)
POTASSIUM SERPL-SCNC: 4.8 MMOL/L — SIGNIFICANT CHANGE UP (ref 3.5–5.3)
POTASSIUM SERPL-SCNC: 4.8 MMOL/L — SIGNIFICANT CHANGE UP (ref 3.5–5.3)
POTASSIUM SERPL-SCNC: 4.9 MMOL/L
POTASSIUM SERPL-SCNC: 4.9 MMOL/L — SIGNIFICANT CHANGE UP (ref 3.5–5.3)
PROT SERPL-MCNC: 6.5 G/DL
PROT SERPL-MCNC: 6.6 G/DL — SIGNIFICANT CHANGE UP (ref 6–8.3)
PROT SERPL-MCNC: 7.9 G/DL — SIGNIFICANT CHANGE UP (ref 6–8.3)
PROT UR STRIP-MCNC: NEGATIVE
PROT UR-MCNC: ABNORMAL
PROTHROM AB SERPL-ACNC: 12 SEC — SIGNIFICANT CHANGE UP (ref 10.6–13.6)
PROTHROM AB SERPL-ACNC: 12.7 SEC — SIGNIFICANT CHANGE UP (ref 10.6–13.6)
PROTHROM AB SERPL-ACNC: 13 SEC — SIGNIFICANT CHANGE UP (ref 10.6–13.6)
PROTHROM AB SERPL-ACNC: 13 SEC — SIGNIFICANT CHANGE UP (ref 10.6–13.6)
PROTHROM AB SERPL-ACNC: 13.2 SEC — SIGNIFICANT CHANGE UP (ref 10.6–13.6)
PROTHROM AB SERPL-ACNC: 13.3 SEC — SIGNIFICANT CHANGE UP (ref 10.6–13.6)
PROTHROM AB SERPL-ACNC: 13.7 SEC — HIGH (ref 10.6–13.6)
RBC # BLD: 1.37 M/UL — LOW (ref 4.2–5.8)
RBC # BLD: 2.47 M/UL — LOW (ref 4.2–5.8)
RBC # BLD: 2.51 M/UL — LOW (ref 4.2–5.8)
RBC # BLD: 2.57 M/UL — LOW (ref 4.2–5.8)
RBC # BLD: 2.57 M/UL — LOW (ref 4.2–5.8)
RBC # BLD: 2.58 M/UL — LOW (ref 4.2–5.8)
RBC # BLD: 2.66 M/UL — LOW (ref 4.2–5.8)
RBC # BLD: 2.72 M/UL — LOW (ref 4.2–5.8)
RBC # BLD: 2.9 M/UL — LOW (ref 4.2–5.8)
RBC # BLD: 3.08 M/UL — LOW (ref 4.2–5.8)
RBC # BLD: 3.45 M/UL
RBC # BLD: 3.78 M/UL — LOW (ref 4.2–5.8)
RBC # FLD: 14.3 % — SIGNIFICANT CHANGE UP (ref 10.3–14.5)
RBC # FLD: 14.5 % — SIGNIFICANT CHANGE UP (ref 10.3–14.5)
RBC # FLD: 14.6 %
RBC # FLD: 14.6 % — HIGH (ref 10.3–14.5)
RBC # FLD: 14.8 % — HIGH (ref 10.3–14.5)
RBC # FLD: 15.6 % — HIGH (ref 10.3–14.5)
RBC CASTS # UR COMP ASSIST: 2 /HPF — SIGNIFICANT CHANGE UP (ref 0–4)
RH IG SCN BLD-IMP: POSITIVE — SIGNIFICANT CHANGE UP
RH IG SCN BLD-IMP: POSITIVE — SIGNIFICANT CHANGE UP
SAO2 % BLDV: 37 % — LOW (ref 67–88)
SAO2 % BLDV: 39 % — LOW (ref 67–88)
SAO2 % BLDV: 46 % — LOW (ref 67–88)
SAO2 % BLDV: 58 % — LOW (ref 67–88)
SARS-COV-2 IGG SERPL IA-ACNC: <0.1 INDEX
SARS-COV-2 IGG SERPL QL IA: NEGATIVE
SARS-COV-2 RNA SPEC QL NAA+PROBE: SIGNIFICANT CHANGE UP
SODIUM SERPL-SCNC: 138 MMOL/L — SIGNIFICANT CHANGE UP (ref 135–145)
SODIUM SERPL-SCNC: 138 MMOL/L — SIGNIFICANT CHANGE UP (ref 135–145)
SODIUM SERPL-SCNC: 139 MMOL/L — SIGNIFICANT CHANGE UP (ref 135–145)
SODIUM SERPL-SCNC: 141 MMOL/L
SODIUM SERPL-SCNC: 141 MMOL/L — SIGNIFICANT CHANGE UP (ref 135–145)
SODIUM SERPL-SCNC: 142 MMOL/L — SIGNIFICANT CHANGE UP (ref 135–145)
SODIUM SERPL-SCNC: 143 MMOL/L — SIGNIFICANT CHANGE UP (ref 135–145)
SODIUM SERPL-SCNC: 144 MMOL/L — SIGNIFICANT CHANGE UP (ref 135–145)
SP GR SPEC: 1.03 — HIGH (ref 1.01–1.02)
SP GR UR STRIP: 1.02
SPECIMEN SOURCE: SIGNIFICANT CHANGE UP
T4 FREE SERPL-MCNC: 1.2 NG/DL
TRIGL SERPL-MCNC: 41 MG/DL
TROPONIN T, HIGH SENSITIVITY RESULT: 69 NG/L — HIGH (ref 0–51)
TROPONIN T, HIGH SENSITIVITY RESULT: 75 NG/L — HIGH (ref 0–51)
TROPONIN T, HIGH SENSITIVITY RESULT: 78 NG/L — HIGH (ref 0–51)
TROPONIN T, HIGH SENSITIVITY RESULT: 98 NG/L — HIGH (ref 0–51)
TSH SERPL-ACNC: 2.72 UIU/ML
UROBILINOGEN FLD QL: NEGATIVE — SIGNIFICANT CHANGE UP
WBC # BLD: 10.03 K/UL — SIGNIFICANT CHANGE UP (ref 3.8–10.5)
WBC # BLD: 10.19 K/UL — SIGNIFICANT CHANGE UP (ref 3.8–10.5)
WBC # BLD: 10.92 K/UL — HIGH (ref 3.8–10.5)
WBC # BLD: 11.05 K/UL — HIGH (ref 3.8–10.5)
WBC # BLD: 11.56 K/UL — HIGH (ref 3.8–10.5)
WBC # BLD: 5.01 K/UL — SIGNIFICANT CHANGE UP (ref 3.8–10.5)
WBC # BLD: 7.55 K/UL — SIGNIFICANT CHANGE UP (ref 3.8–10.5)
WBC # BLD: 7.69 K/UL — SIGNIFICANT CHANGE UP (ref 3.8–10.5)
WBC # BLD: 8.19 K/UL — SIGNIFICANT CHANGE UP (ref 3.8–10.5)
WBC # BLD: 8.99 K/UL — SIGNIFICANT CHANGE UP (ref 3.8–10.5)
WBC # BLD: 9.68 K/UL — SIGNIFICANT CHANGE UP (ref 3.8–10.5)
WBC # FLD AUTO: 10.03 K/UL — SIGNIFICANT CHANGE UP (ref 3.8–10.5)
WBC # FLD AUTO: 10.19 K/UL — SIGNIFICANT CHANGE UP (ref 3.8–10.5)
WBC # FLD AUTO: 10.92 K/UL — HIGH (ref 3.8–10.5)
WBC # FLD AUTO: 11.05 K/UL — HIGH (ref 3.8–10.5)
WBC # FLD AUTO: 11.56 K/UL — HIGH (ref 3.8–10.5)
WBC # FLD AUTO: 5.01 K/UL — SIGNIFICANT CHANGE UP (ref 3.8–10.5)
WBC # FLD AUTO: 6.41 K/UL
WBC # FLD AUTO: 7.55 K/UL — SIGNIFICANT CHANGE UP (ref 3.8–10.5)
WBC # FLD AUTO: 7.69 K/UL — SIGNIFICANT CHANGE UP (ref 3.8–10.5)
WBC # FLD AUTO: 8.19 K/UL — SIGNIFICANT CHANGE UP (ref 3.8–10.5)
WBC # FLD AUTO: 8.99 K/UL — SIGNIFICANT CHANGE UP (ref 3.8–10.5)
WBC # FLD AUTO: 9.68 K/UL — SIGNIFICANT CHANGE UP (ref 3.8–10.5)
WBC UR QL: 1 /HPF — SIGNIFICANT CHANGE UP (ref 0–5)

## 2020-01-01 PROCEDURE — 82435 ASSAY OF BLOOD CHLORIDE: CPT

## 2020-01-01 PROCEDURE — 99284 EMERGENCY DEPT VISIT MOD MDM: CPT | Mod: CS,GC

## 2020-01-01 PROCEDURE — 80048 BASIC METABOLIC PNL TOTAL CA: CPT

## 2020-01-01 PROCEDURE — 83880 ASSAY OF NATRIURETIC PEPTIDE: CPT

## 2020-01-01 PROCEDURE — 82947 ASSAY GLUCOSE BLOOD QUANT: CPT

## 2020-01-01 PROCEDURE — 73000 X-RAY EXAM OF COLLAR BONE: CPT | Mod: LT

## 2020-01-01 PROCEDURE — 93005 ELECTROCARDIOGRAM TRACING: CPT

## 2020-01-01 PROCEDURE — 71045 X-RAY EXAM CHEST 1 VIEW: CPT | Mod: 26

## 2020-01-01 PROCEDURE — 80053 COMPREHEN METABOLIC PANEL: CPT

## 2020-01-01 PROCEDURE — 86901 BLOOD TYPING SEROLOGIC RH(D): CPT

## 2020-01-01 PROCEDURE — 71045 X-RAY EXAM CHEST 1 VIEW: CPT

## 2020-01-01 PROCEDURE — 85610 PROTHROMBIN TIME: CPT

## 2020-01-01 PROCEDURE — 72125 CT NECK SPINE W/O DYE: CPT

## 2020-01-01 PROCEDURE — 84484 ASSAY OF TROPONIN QUANT: CPT

## 2020-01-01 PROCEDURE — 85730 THROMBOPLASTIN TIME PARTIAL: CPT

## 2020-01-01 PROCEDURE — 73552 X-RAY EXAM OF FEMUR 2/>: CPT | Mod: 26,RT

## 2020-01-01 PROCEDURE — 73552 X-RAY EXAM OF FEMUR 2/>: CPT

## 2020-01-01 PROCEDURE — 97162 PT EVAL MOD COMPLEX 30 MIN: CPT

## 2020-01-01 PROCEDURE — 84295 ASSAY OF SERUM SODIUM: CPT

## 2020-01-01 PROCEDURE — 99214 OFFICE O/P EST MOD 30 MIN: CPT

## 2020-01-01 PROCEDURE — 82565 ASSAY OF CREATININE: CPT

## 2020-01-01 PROCEDURE — 85018 HEMOGLOBIN: CPT

## 2020-01-01 PROCEDURE — 81003 URINALYSIS AUTO W/O SCOPE: CPT | Mod: QW

## 2020-01-01 PROCEDURE — 74177 CT ABD & PELVIS W/CONTRAST: CPT

## 2020-01-01 PROCEDURE — 84132 ASSAY OF SERUM POTASSIUM: CPT

## 2020-01-01 PROCEDURE — 99285 EMERGENCY DEPT VISIT HI MDM: CPT | Mod: 25

## 2020-01-01 PROCEDURE — 86900 BLOOD TYPING SEROLOGIC ABO: CPT

## 2020-01-01 PROCEDURE — 84100 ASSAY OF PHOSPHORUS: CPT

## 2020-01-01 PROCEDURE — 83605 ASSAY OF LACTIC ACID: CPT

## 2020-01-01 PROCEDURE — 85027 COMPLETE CBC AUTOMATED: CPT

## 2020-01-01 PROCEDURE — 99231 SBSQ HOSP IP/OBS SF/LOW 25: CPT

## 2020-01-01 PROCEDURE — 93010 ELECTROCARDIOGRAM REPORT: CPT | Mod: GC

## 2020-01-01 PROCEDURE — 87086 URINE CULTURE/COLONY COUNT: CPT

## 2020-01-01 PROCEDURE — 85025 COMPLETE CBC W/AUTO DIFF WBC: CPT

## 2020-01-01 PROCEDURE — 93010 ELECTROCARDIOGRAM REPORT: CPT

## 2020-01-01 PROCEDURE — 99283 EMERGENCY DEPT VISIT LOW MDM: CPT | Mod: 25

## 2020-01-01 PROCEDURE — 72190 X-RAY EXAM OF PELVIS: CPT | Mod: 26,59

## 2020-01-01 PROCEDURE — 99238 HOSP IP/OBS DSCHRG MGMT 30/<: CPT

## 2020-01-01 PROCEDURE — 82803 BLOOD GASES ANY COMBINATION: CPT

## 2020-01-01 PROCEDURE — 73502 X-RAY EXAM HIP UNI 2-3 VIEWS: CPT

## 2020-01-01 PROCEDURE — 99233 SBSQ HOSP IP/OBS HIGH 50: CPT | Mod: GC

## 2020-01-01 PROCEDURE — 70450 CT HEAD/BRAIN W/O DYE: CPT | Mod: 26

## 2020-01-01 PROCEDURE — 83735 ASSAY OF MAGNESIUM: CPT

## 2020-01-01 PROCEDURE — 85014 HEMATOCRIT: CPT

## 2020-01-01 PROCEDURE — 97110 THERAPEUTIC EXERCISES: CPT

## 2020-01-01 PROCEDURE — 99223 1ST HOSP IP/OBS HIGH 75: CPT

## 2020-01-01 PROCEDURE — 82272 OCCULT BLD FECES 1-3 TESTS: CPT

## 2020-01-01 PROCEDURE — 87040 BLOOD CULTURE FOR BACTERIA: CPT

## 2020-01-01 PROCEDURE — 82330 ASSAY OF CALCIUM: CPT

## 2020-01-01 PROCEDURE — 99232 SBSQ HOSP IP/OBS MODERATE 35: CPT

## 2020-01-01 PROCEDURE — 71275 CT ANGIOGRAPHY CHEST: CPT | Mod: 26

## 2020-01-01 PROCEDURE — 73010 X-RAY EXAM OF SHOULDER BLADE: CPT | Mod: LT

## 2020-01-01 PROCEDURE — 99285 EMERGENCY DEPT VISIT HI MDM: CPT | Mod: CS,GC

## 2020-01-01 PROCEDURE — 81001 URINALYSIS AUTO W/SCOPE: CPT

## 2020-01-01 PROCEDURE — 96374 THER/PROPH/DIAG INJ IV PUSH: CPT | Mod: XU

## 2020-01-01 PROCEDURE — 97530 THERAPEUTIC ACTIVITIES: CPT

## 2020-01-01 PROCEDURE — 72125 CT NECK SPINE W/O DYE: CPT | Mod: 26

## 2020-01-01 PROCEDURE — 83690 ASSAY OF LIPASE: CPT

## 2020-01-01 PROCEDURE — 86850 RBC ANTIBODY SCREEN: CPT

## 2020-01-01 PROCEDURE — 99291 CRITICAL CARE FIRST HOUR: CPT

## 2020-01-01 PROCEDURE — 73502 X-RAY EXAM HIP UNI 2-3 VIEWS: CPT | Mod: 26,RT

## 2020-01-01 PROCEDURE — 99233 SBSQ HOSP IP/OBS HIGH 50: CPT

## 2020-01-01 PROCEDURE — U0003: CPT

## 2020-01-01 PROCEDURE — 87635 SARS-COV-2 COVID-19 AMP PRB: CPT

## 2020-01-01 PROCEDURE — 72190 X-RAY EXAM OF PELVIS: CPT

## 2020-01-01 PROCEDURE — G0439: CPT

## 2020-01-01 PROCEDURE — 74177 CT ABD & PELVIS W/CONTRAST: CPT | Mod: 26

## 2020-01-01 PROCEDURE — 99204 OFFICE O/P NEW MOD 45 MIN: CPT

## 2020-01-01 PROCEDURE — A4565: CPT

## 2020-01-01 PROCEDURE — 99232 SBSQ HOSP IP/OBS MODERATE 35: CPT | Mod: GC

## 2020-01-01 PROCEDURE — 97165 OT EVAL LOW COMPLEX 30 MIN: CPT

## 2020-01-01 PROCEDURE — 71275 CT ANGIOGRAPHY CHEST: CPT

## 2020-01-01 PROCEDURE — 96372 THER/PROPH/DIAG INJ SC/IM: CPT | Mod: XU

## 2020-01-01 PROCEDURE — 99214 OFFICE O/P EST MOD 30 MIN: CPT | Mod: 25

## 2020-01-01 PROCEDURE — 70450 CT HEAD/BRAIN W/O DYE: CPT

## 2020-01-01 PROCEDURE — 36415 COLL VENOUS BLD VENIPUNCTURE: CPT

## 2020-01-01 RX ORDER — SODIUM CHLORIDE 9 MG/ML
500 INJECTION INTRAMUSCULAR; INTRAVENOUS; SUBCUTANEOUS ONCE
Refills: 0 | Status: COMPLETED | OUTPATIENT
Start: 2020-01-01 | End: 2020-01-01

## 2020-01-01 RX ORDER — APIXABAN 2.5 MG/1
1 TABLET, FILM COATED ORAL
Qty: 0 | Refills: 0 | DISCHARGE

## 2020-01-01 RX ORDER — FINASTERIDE 5 MG/1
5 TABLET, FILM COATED ORAL DAILY
Refills: 0 | Status: DISCONTINUED | OUTPATIENT
Start: 2020-01-01 | End: 2020-01-01

## 2020-01-01 RX ORDER — PIPERACILLIN AND TAZOBACTAM 4; .5 G/20ML; G/20ML
3.38 INJECTION, POWDER, LYOPHILIZED, FOR SOLUTION INTRAVENOUS ONCE
Refills: 0 | Status: COMPLETED | OUTPATIENT
Start: 2020-01-01 | End: 2020-01-01

## 2020-01-01 RX ORDER — CHOLECALCIFEROL (VITAMIN D3) 125 MCG
1000 CAPSULE ORAL DAILY
Refills: 0 | Status: DISCONTINUED | OUTPATIENT
Start: 2020-01-01 | End: 2020-01-01

## 2020-01-01 RX ORDER — LIDOCAINE 4 G/100G
2 CREAM TOPICAL DAILY
Refills: 0 | Status: DISCONTINUED | OUTPATIENT
Start: 2020-01-01 | End: 2020-01-01

## 2020-01-01 RX ORDER — CALCIUM CARBONATE 500(1250)
1 TABLET ORAL
Qty: 0 | Refills: 0 | DISCHARGE
Start: 2020-01-01

## 2020-01-01 RX ORDER — HALOPERIDOL DECANOATE 100 MG/ML
2.5 INJECTION INTRAMUSCULAR ONCE
Refills: 0 | Status: COMPLETED | OUTPATIENT
Start: 2020-01-01 | End: 2020-01-01

## 2020-01-01 RX ORDER — ROSUVASTATIN CALCIUM 20 MG/1
20 TABLET, FILM COATED ORAL
Qty: 90 | Refills: 3 | Status: ACTIVE | COMMUNITY
Start: 2018-08-16 | End: 1900-01-01

## 2020-01-01 RX ORDER — DONEPEZIL HYDROCHLORIDE 10 MG/1
1 TABLET, FILM COATED ORAL
Qty: 0 | Refills: 0 | DISCHARGE

## 2020-01-01 RX ORDER — SODIUM CHLORIDE 9 MG/ML
250 INJECTION INTRAMUSCULAR; INTRAVENOUS; SUBCUTANEOUS ONCE
Refills: 0 | Status: COMPLETED | OUTPATIENT
Start: 2020-01-01 | End: 2020-01-01

## 2020-01-01 RX ORDER — ENOXAPARIN SODIUM 100 MG/ML
40 INJECTION SUBCUTANEOUS DAILY
Refills: 0 | Status: DISCONTINUED | OUTPATIENT
Start: 2020-01-01 | End: 2020-01-01

## 2020-01-01 RX ORDER — SODIUM CHLORIDE 9 MG/ML
500 INJECTION, SOLUTION INTRAVENOUS ONCE
Refills: 0 | Status: COMPLETED | OUTPATIENT
Start: 2020-01-01 | End: 2020-01-01

## 2020-01-01 RX ORDER — METOPROLOL TARTRATE 50 MG
25 TABLET ORAL DAILY
Refills: 0 | Status: DISCONTINUED | OUTPATIENT
Start: 2020-01-01 | End: 2020-01-01

## 2020-01-01 RX ORDER — ACETAMINOPHEN 500 MG
1000 TABLET ORAL EVERY 6 HOURS
Refills: 0 | Status: COMPLETED | OUTPATIENT
Start: 2020-01-01 | End: 2020-01-01

## 2020-01-01 RX ORDER — ATORVASTATIN CALCIUM 40 MG/1
40 TABLET, FILM COATED ORAL
Qty: 90 | Refills: 3 | Status: DISCONTINUED | COMMUNITY
Start: 2017-10-24 | End: 2020-01-01

## 2020-01-01 RX ORDER — ACETAMINOPHEN 500 MG
650 TABLET ORAL EVERY 6 HOURS
Refills: 0 | Status: DISCONTINUED | OUTPATIENT
Start: 2020-01-01 | End: 2020-01-01

## 2020-01-01 RX ORDER — ACETAMINOPHEN 500 MG
2 TABLET ORAL
Qty: 0 | Refills: 0 | DISCHARGE
Start: 2020-01-01

## 2020-01-01 RX ORDER — SODIUM,POTASSIUM PHOSPHATES 278-250MG
2 POWDER IN PACKET (EA) ORAL ONCE
Refills: 0 | Status: COMPLETED | OUTPATIENT
Start: 2020-01-01 | End: 2020-01-01

## 2020-01-01 RX ORDER — SODIUM CHLORIDE 9 MG/ML
1000 INJECTION, SOLUTION INTRAVENOUS
Refills: 0 | Status: DISCONTINUED | OUTPATIENT
Start: 2020-01-01 | End: 2020-01-01

## 2020-01-01 RX ORDER — TAMSULOSIN HYDROCHLORIDE 0.4 MG/1
0.4 CAPSULE ORAL AT BEDTIME
Refills: 0 | Status: DISCONTINUED | OUTPATIENT
Start: 2020-01-01 | End: 2020-01-01

## 2020-01-01 RX ORDER — CHLORHEXIDINE GLUCONATE 213 G/1000ML
1 SOLUTION TOPICAL
Refills: 0 | Status: DISCONTINUED | OUTPATIENT
Start: 2020-01-01 | End: 2020-01-01

## 2020-01-01 RX ORDER — CHOLECALCIFEROL (VITAMIN D3) 125 MCG
1000 CAPSULE ORAL
Qty: 0 | Refills: 0 | DISCHARGE
Start: 2020-01-01

## 2020-01-01 RX ORDER — METOPROLOL TARTRATE 50 MG
25 TABLET ORAL EVERY 12 HOURS
Refills: 0 | Status: DISCONTINUED | OUTPATIENT
Start: 2020-01-01 | End: 2020-01-01

## 2020-01-01 RX ORDER — CALCIUM CARBONATE 500(1250)
1 TABLET ORAL EVERY 4 HOURS
Refills: 0 | Status: DISCONTINUED | OUTPATIENT
Start: 2020-01-01 | End: 2020-01-01

## 2020-01-01 RX ORDER — ATORVASTATIN CALCIUM 80 MG/1
80 TABLET, FILM COATED ORAL AT BEDTIME
Refills: 0 | Status: DISCONTINUED | OUTPATIENT
Start: 2020-01-01 | End: 2020-01-01

## 2020-01-01 RX ORDER — SODIUM CHLORIDE 9 MG/ML
1000 INJECTION INTRAMUSCULAR; INTRAVENOUS; SUBCUTANEOUS ONCE
Refills: 0 | Status: DISCONTINUED | OUTPATIENT
Start: 2020-01-01 | End: 2020-01-01

## 2020-01-01 RX ORDER — LIDOCAINE 4 G/100G
1 CREAM TOPICAL
Qty: 0 | Refills: 0 | DISCHARGE
Start: 2020-01-01

## 2020-01-01 RX ORDER — TRAMADOL HYDROCHLORIDE 50 MG/1
25 TABLET ORAL ONCE
Refills: 0 | Status: DISCONTINUED | OUTPATIENT
Start: 2020-01-01 | End: 2020-01-01

## 2020-01-01 RX ORDER — POLYETHYLENE GLYCOL 3350 17 G/17G
0 POWDER, FOR SOLUTION ORAL
Qty: 0 | Refills: 0 | DISCHARGE

## 2020-01-01 RX ORDER — ASPIRIN/CALCIUM CARB/MAGNESIUM 324 MG
162 TABLET ORAL DAILY
Refills: 0 | Status: DISCONTINUED | OUTPATIENT
Start: 2020-01-01 | End: 2020-01-01

## 2020-01-01 RX ORDER — MEMANTINE HYDROCHLORIDE 10 MG/1
10 TABLET ORAL
Refills: 0 | Status: DISCONTINUED | OUTPATIENT
Start: 2020-01-01 | End: 2020-01-01

## 2020-01-01 RX ADMIN — Medication 1000 MILLIGRAM(S): at 05:10

## 2020-01-01 RX ADMIN — Medication 25 MILLIGRAM(S): at 05:46

## 2020-01-01 RX ADMIN — SODIUM CHLORIDE 250 MILLILITER(S): 9 INJECTION INTRAMUSCULAR; INTRAVENOUS; SUBCUTANEOUS at 17:57

## 2020-01-01 RX ADMIN — Medication 650 MILLIGRAM(S): at 08:49

## 2020-01-01 RX ADMIN — ATORVASTATIN CALCIUM 80 MILLIGRAM(S): 80 TABLET, FILM COATED ORAL at 21:06

## 2020-01-01 RX ADMIN — LIDOCAINE 2 PATCH: 4 CREAM TOPICAL at 11:38

## 2020-01-01 RX ADMIN — Medication 62.5 MILLIMOLE(S): at 05:09

## 2020-01-01 RX ADMIN — Medication 2 MILLIGRAM(S): at 21:08

## 2020-01-01 RX ADMIN — TRAMADOL HYDROCHLORIDE 25 MILLIGRAM(S): 50 TABLET ORAL at 11:13

## 2020-01-01 RX ADMIN — Medication 2 MILLIGRAM(S): at 21:42

## 2020-01-01 RX ADMIN — Medication 650 MILLIGRAM(S): at 12:00

## 2020-01-01 RX ADMIN — Medication 2 MILLIGRAM(S): at 21:07

## 2020-01-01 RX ADMIN — MEMANTINE HYDROCHLORIDE 10 MILLIGRAM(S): 10 TABLET ORAL at 17:33

## 2020-01-01 RX ADMIN — LIDOCAINE 2 PATCH: 4 CREAM TOPICAL at 00:00

## 2020-01-01 RX ADMIN — ENOXAPARIN SODIUM 40 MILLIGRAM(S): 100 INJECTION SUBCUTANEOUS at 11:13

## 2020-01-01 RX ADMIN — MEMANTINE HYDROCHLORIDE 10 MILLIGRAM(S): 10 TABLET ORAL at 05:13

## 2020-01-01 RX ADMIN — Medication 1000 MILLIGRAM(S): at 12:44

## 2020-01-01 RX ADMIN — Medication 25 MILLIGRAM(S): at 05:20

## 2020-01-01 RX ADMIN — Medication 62.5 MILLIMOLE(S): at 05:20

## 2020-01-01 RX ADMIN — LIDOCAINE 2 PATCH: 4 CREAM TOPICAL at 18:44

## 2020-01-01 RX ADMIN — LIDOCAINE 2 PATCH: 4 CREAM TOPICAL at 12:30

## 2020-01-01 RX ADMIN — ENOXAPARIN SODIUM 40 MILLIGRAM(S): 100 INJECTION SUBCUTANEOUS at 11:20

## 2020-01-01 RX ADMIN — LIDOCAINE 2 PATCH: 4 CREAM TOPICAL at 19:00

## 2020-01-01 RX ADMIN — PIPERACILLIN AND TAZOBACTAM 200 GRAM(S): 4; .5 INJECTION, POWDER, LYOPHILIZED, FOR SOLUTION INTRAVENOUS at 13:21

## 2020-01-01 RX ADMIN — Medication 650 MILLIGRAM(S): at 10:51

## 2020-01-01 RX ADMIN — ATORVASTATIN CALCIUM 80 MILLIGRAM(S): 80 TABLET, FILM COATED ORAL at 21:00

## 2020-01-01 RX ADMIN — MEMANTINE HYDROCHLORIDE 10 MILLIGRAM(S): 10 TABLET ORAL at 17:27

## 2020-01-01 RX ADMIN — Medication 1 TABLET(S): at 08:58

## 2020-01-01 RX ADMIN — Medication 650 MILLIGRAM(S): at 11:15

## 2020-01-01 RX ADMIN — ATORVASTATIN CALCIUM 80 MILLIGRAM(S): 80 TABLET, FILM COATED ORAL at 21:42

## 2020-01-01 RX ADMIN — Medication 1000 UNIT(S): at 12:31

## 2020-01-01 RX ADMIN — MEMANTINE HYDROCHLORIDE 10 MILLIGRAM(S): 10 TABLET ORAL at 05:28

## 2020-01-01 RX ADMIN — Medication 1000 UNIT(S): at 12:37

## 2020-01-01 RX ADMIN — Medication 1000 UNIT(S): at 11:39

## 2020-01-01 RX ADMIN — Medication 1000 UNIT(S): at 11:20

## 2020-01-01 RX ADMIN — Medication 2 MILLIGRAM(S): at 20:58

## 2020-01-01 RX ADMIN — MEMANTINE HYDROCHLORIDE 10 MILLIGRAM(S): 10 TABLET ORAL at 05:46

## 2020-01-01 RX ADMIN — LIDOCAINE 2 PATCH: 4 CREAM TOPICAL at 23:30

## 2020-01-01 RX ADMIN — Medication 2 MILLIGRAM(S): at 22:07

## 2020-01-01 RX ADMIN — ATORVASTATIN CALCIUM 80 MILLIGRAM(S): 80 TABLET, FILM COATED ORAL at 21:59

## 2020-01-01 RX ADMIN — LIDOCAINE 2 PATCH: 4 CREAM TOPICAL at 17:34

## 2020-01-01 RX ADMIN — MEMANTINE HYDROCHLORIDE 10 MILLIGRAM(S): 10 TABLET ORAL at 06:17

## 2020-01-01 RX ADMIN — Medication 1000 MILLIGRAM(S): at 17:48

## 2020-01-01 RX ADMIN — ENOXAPARIN SODIUM 40 MILLIGRAM(S): 100 INJECTION SUBCUTANEOUS at 11:23

## 2020-01-01 RX ADMIN — ENOXAPARIN SODIUM 40 MILLIGRAM(S): 100 INJECTION SUBCUTANEOUS at 11:02

## 2020-01-01 RX ADMIN — Medication 1000 UNIT(S): at 11:21

## 2020-01-01 RX ADMIN — Medication 2 MILLIGRAM(S): at 21:59

## 2020-01-01 RX ADMIN — LIDOCAINE 2 PATCH: 4 CREAM TOPICAL at 00:31

## 2020-01-01 RX ADMIN — MEMANTINE HYDROCHLORIDE 10 MILLIGRAM(S): 10 TABLET ORAL at 17:17

## 2020-01-01 RX ADMIN — LIDOCAINE 2 PATCH: 4 CREAM TOPICAL at 11:20

## 2020-01-01 RX ADMIN — Medication 1 MILLIGRAM(S): at 09:23

## 2020-01-01 RX ADMIN — Medication 162 MILLIGRAM(S): at 15:09

## 2020-01-01 RX ADMIN — Medication 400 MILLIGRAM(S): at 05:09

## 2020-01-01 RX ADMIN — LIDOCAINE 2 PATCH: 4 CREAM TOPICAL at 23:40

## 2020-01-01 RX ADMIN — LIDOCAINE 2 PATCH: 4 CREAM TOPICAL at 22:01

## 2020-01-01 RX ADMIN — Medication 1000 UNIT(S): at 11:13

## 2020-01-01 RX ADMIN — Medication 25 MILLIGRAM(S): at 21:08

## 2020-01-01 RX ADMIN — Medication 1 MILLIGRAM(S): at 08:53

## 2020-01-01 RX ADMIN — Medication 25 MILLIGRAM(S): at 05:13

## 2020-01-01 RX ADMIN — SODIUM CHLORIDE 1000 MILLILITER(S): 9 INJECTION INTRAMUSCULAR; INTRAVENOUS; SUBCUTANEOUS at 00:48

## 2020-01-01 RX ADMIN — Medication 25 MILLIGRAM(S): at 17:27

## 2020-01-01 RX ADMIN — ATORVASTATIN CALCIUM 80 MILLIGRAM(S): 80 TABLET, FILM COATED ORAL at 21:08

## 2020-01-01 RX ADMIN — FINASTERIDE 5 MILLIGRAM(S): 5 TABLET, FILM COATED ORAL at 11:02

## 2020-01-01 RX ADMIN — Medication 25 MILLIGRAM(S): at 21:59

## 2020-01-01 RX ADMIN — TAMSULOSIN HYDROCHLORIDE 0.4 MILLIGRAM(S): 0.4 CAPSULE ORAL at 21:00

## 2020-01-01 RX ADMIN — LIDOCAINE 2 PATCH: 4 CREAM TOPICAL at 12:39

## 2020-01-01 RX ADMIN — Medication 1 MILLIGRAM(S): at 11:20

## 2020-01-01 RX ADMIN — ENOXAPARIN SODIUM 40 MILLIGRAM(S): 100 INJECTION SUBCUTANEOUS at 11:37

## 2020-01-01 RX ADMIN — TRAMADOL HYDROCHLORIDE 25 MILLIGRAM(S): 50 TABLET ORAL at 11:43

## 2020-01-01 RX ADMIN — FINASTERIDE 5 MILLIGRAM(S): 5 TABLET, FILM COATED ORAL at 11:13

## 2020-01-01 RX ADMIN — MEMANTINE HYDROCHLORIDE 10 MILLIGRAM(S): 10 TABLET ORAL at 18:15

## 2020-01-01 RX ADMIN — MEMANTINE HYDROCHLORIDE 10 MILLIGRAM(S): 10 TABLET ORAL at 05:20

## 2020-01-01 RX ADMIN — LIDOCAINE 2 PATCH: 4 CREAM TOPICAL at 11:03

## 2020-01-01 RX ADMIN — TAMSULOSIN HYDROCHLORIDE 0.4 MILLIGRAM(S): 0.4 CAPSULE ORAL at 21:59

## 2020-01-01 RX ADMIN — MEMANTINE HYDROCHLORIDE 10 MILLIGRAM(S): 10 TABLET ORAL at 17:54

## 2020-01-01 RX ADMIN — ATORVASTATIN CALCIUM 80 MILLIGRAM(S): 80 TABLET, FILM COATED ORAL at 22:07

## 2020-01-01 RX ADMIN — Medication 1 MILLIGRAM(S): at 08:04

## 2020-01-01 RX ADMIN — FINASTERIDE 5 MILLIGRAM(S): 5 TABLET, FILM COATED ORAL at 11:39

## 2020-01-01 RX ADMIN — Medication 1000 MILLIGRAM(S): at 23:41

## 2020-01-01 RX ADMIN — TAMSULOSIN HYDROCHLORIDE 0.4 MILLIGRAM(S): 0.4 CAPSULE ORAL at 21:42

## 2020-01-01 RX ADMIN — Medication 25 MILLIGRAM(S): at 08:54

## 2020-01-01 RX ADMIN — CHLORHEXIDINE GLUCONATE 1 APPLICATION(S): 213 SOLUTION TOPICAL at 05:28

## 2020-01-01 RX ADMIN — LIDOCAINE 2 PATCH: 4 CREAM TOPICAL at 11:12

## 2020-01-01 RX ADMIN — LIDOCAINE 2 PATCH: 4 CREAM TOPICAL at 19:17

## 2020-01-01 RX ADMIN — Medication 650 MILLIGRAM(S): at 11:03

## 2020-01-01 RX ADMIN — TAMSULOSIN HYDROCHLORIDE 0.4 MILLIGRAM(S): 0.4 CAPSULE ORAL at 21:08

## 2020-01-01 RX ADMIN — ENOXAPARIN SODIUM 40 MILLIGRAM(S): 100 INJECTION SUBCUTANEOUS at 12:37

## 2020-01-01 RX ADMIN — Medication 1 MILLIGRAM(S): at 08:19

## 2020-01-01 RX ADMIN — FINASTERIDE 5 MILLIGRAM(S): 5 TABLET, FILM COATED ORAL at 12:37

## 2020-01-01 RX ADMIN — LIDOCAINE 2 PATCH: 4 CREAM TOPICAL at 11:22

## 2020-01-01 RX ADMIN — FINASTERIDE 5 MILLIGRAM(S): 5 TABLET, FILM COATED ORAL at 12:30

## 2020-01-01 RX ADMIN — TAMSULOSIN HYDROCHLORIDE 0.4 MILLIGRAM(S): 0.4 CAPSULE ORAL at 22:07

## 2020-01-01 RX ADMIN — LIDOCAINE 2 PATCH: 4 CREAM TOPICAL at 21:07

## 2020-01-01 RX ADMIN — Medication 1000 UNIT(S): at 11:02

## 2020-01-01 RX ADMIN — Medication 1 MILLIGRAM(S): at 08:52

## 2020-01-01 RX ADMIN — LIDOCAINE 2 PATCH: 4 CREAM TOPICAL at 11:47

## 2020-01-01 RX ADMIN — HALOPERIDOL DECANOATE 2.5 MILLIGRAM(S): 100 INJECTION INTRAMUSCULAR at 16:13

## 2020-01-01 RX ADMIN — SODIUM CHLORIDE 75 MILLILITER(S): 9 INJECTION, SOLUTION INTRAVENOUS at 03:49

## 2020-01-01 RX ADMIN — Medication 2 PACKET(S): at 17:45

## 2020-01-01 RX ADMIN — LIDOCAINE 2 PATCH: 4 CREAM TOPICAL at 00:11

## 2020-01-01 RX ADMIN — MEMANTINE HYDROCHLORIDE 10 MILLIGRAM(S): 10 TABLET ORAL at 06:06

## 2020-01-01 RX ADMIN — Medication 25 MILLIGRAM(S): at 12:31

## 2020-01-01 RX ADMIN — LIDOCAINE 2 PATCH: 4 CREAM TOPICAL at 08:37

## 2020-01-01 RX ADMIN — CHLORHEXIDINE GLUCONATE 1 APPLICATION(S): 213 SOLUTION TOPICAL at 05:20

## 2020-01-01 RX ADMIN — Medication 400 MILLIGRAM(S): at 17:33

## 2020-01-01 RX ADMIN — FINASTERIDE 5 MILLIGRAM(S): 5 TABLET, FILM COATED ORAL at 11:21

## 2020-01-01 RX ADMIN — Medication 25 MILLIGRAM(S): at 05:28

## 2020-01-01 RX ADMIN — TAMSULOSIN HYDROCHLORIDE 0.4 MILLIGRAM(S): 0.4 CAPSULE ORAL at 21:06

## 2020-01-01 RX ADMIN — FINASTERIDE 5 MILLIGRAM(S): 5 TABLET, FILM COATED ORAL at 11:20

## 2020-01-01 RX ADMIN — Medication 400 MILLIGRAM(S): at 23:41

## 2020-01-01 RX ADMIN — LIDOCAINE 2 PATCH: 4 CREAM TOPICAL at 19:18

## 2020-01-01 RX ADMIN — Medication 25 MILLIGRAM(S): at 06:17

## 2020-01-01 RX ADMIN — Medication 650 MILLIGRAM(S): at 08:19

## 2020-01-01 RX ADMIN — SODIUM CHLORIDE 1000 MILLILITER(S): 9 INJECTION, SOLUTION INTRAVENOUS at 02:00

## 2020-01-01 RX ADMIN — LIDOCAINE 2 PATCH: 4 CREAM TOPICAL at 00:24

## 2020-01-01 RX ADMIN — MEMANTINE HYDROCHLORIDE 10 MILLIGRAM(S): 10 TABLET ORAL at 17:26

## 2020-01-01 RX ADMIN — Medication 400 MILLIGRAM(S): at 12:29

## 2020-01-05 ENCOUNTER — RX RENEWAL (OUTPATIENT)
Age: 85
End: 2020-01-05

## 2020-01-05 RX ORDER — METOPROLOL SUCCINATE 25 MG/1
25 TABLET, EXTENDED RELEASE ORAL
Qty: 90 | Refills: 3 | Status: ACTIVE | COMMUNITY
Start: 2018-12-12 | End: 1900-01-01

## 2020-01-05 RX ORDER — FINASTERIDE 5 MG/1
5 TABLET, FILM COATED ORAL DAILY
Qty: 90 | Refills: 3 | Status: ACTIVE | COMMUNITY
Start: 2017-01-09 | End: 1900-01-01

## 2020-02-05 ENCOUNTER — APPOINTMENT (OUTPATIENT)
Dept: INTERNAL MEDICINE | Facility: CLINIC | Age: 85
End: 2020-02-05
Payer: MEDICARE

## 2020-02-05 VITALS
DIASTOLIC BLOOD PRESSURE: 70 MMHG | SYSTOLIC BLOOD PRESSURE: 120 MMHG | WEIGHT: 151 LBS | HEART RATE: 76 BPM | HEIGHT: 66 IN | BODY MASS INDEX: 24.27 KG/M2

## 2020-02-05 PROCEDURE — 99214 OFFICE O/P EST MOD 30 MIN: CPT | Mod: 25

## 2020-02-05 PROCEDURE — 36415 COLL VENOUS BLD VENIPUNCTURE: CPT

## 2020-02-05 RX ORDER — APIXABAN 2.5 MG/1
2.5 TABLET, FILM COATED ORAL
Qty: 60 | Refills: 5 | Status: ACTIVE | COMMUNITY
Start: 2020-02-05

## 2020-02-05 NOTE — PLAN
[FreeTextEntry1] : on prednisone 2 for pmr\par s/p pelvic fracture\par bp good off quinaprl\par appetite better off aricept

## 2020-02-05 NOTE — PHYSICAL EXAM
[Well Nourished] : well nourished [No Acute Distress] : no acute distress [Normal Outer Ear/Nose] : the outer ears and nose were normal in appearance [Normal Oropharynx] : the oropharynx was normal [No Lymphadenopathy] : no lymphadenopathy [No JVD] : no jugular venous distention [No Respiratory Distress] : no respiratory distress  [No Accessory Muscle Use] : no accessory muscle use [Normal Rate] : normal rate  [de-identified] : afib at 76 [de-identified] : 1 and 1/2 edema

## 2020-02-05 NOTE — HISTORY OF PRESENT ILLNESS
[FreeTextEntry1] : s/p fal and pelvic fracture [de-identified] : s/p fall and fracture pelvis\par back on eliques\par went to rehab\par shoulder still bothering on right side\par urination fair on med\par off donepzil \par appetite better

## 2020-02-05 NOTE — REVIEW OF SYSTEMS
[Earache] : no earache [Fever] : no fever [Chest Pain] : no chest pain [Orthopena] : no orthopnea [Shortness Of Breath] : no shortness of breath [FreeTextEntry5] : afib [Wheezing] : no wheezing

## 2020-02-06 LAB
ALBUMIN SERPL ELPH-MCNC: 4.2 G/DL
ALP BLD-CCNC: 166 U/L
ALT SERPL-CCNC: 15 U/L
ANION GAP SERPL CALC-SCNC: 13 MMOL/L
AST SERPL-CCNC: 27 U/L
BASOPHILS # BLD AUTO: 0.03 K/UL
BASOPHILS NFR BLD AUTO: 0.4 %
BILIRUB SERPL-MCNC: 0.3 MG/DL
BUN SERPL-MCNC: 24 MG/DL
CALCIUM SERPL-MCNC: 10.5 MG/DL
CHLORIDE SERPL-SCNC: 104 MMOL/L
CHOLEST SERPL-MCNC: 155 MG/DL
CHOLEST/HDLC SERPL: 2 RATIO
CO2 SERPL-SCNC: 25 MMOL/L
CREAT SERPL-MCNC: 0.9 MG/DL
EOSINOPHIL # BLD AUTO: 0.44 K/UL
EOSINOPHIL NFR BLD AUTO: 5.5 %
ESTIMATED AVERAGE GLUCOSE: 114 MG/DL
GLUCOSE SERPL-MCNC: 97 MG/DL
HBA1C MFR BLD HPLC: 5.6 %
HCT VFR BLD CALC: 34.4 %
HDLC SERPL-MCNC: 78 MG/DL
HGB BLD-MCNC: 10.9 G/DL
IMM GRANULOCYTES NFR BLD AUTO: 0.4 %
LDLC SERPL CALC-MCNC: 70 MG/DL
LYMPHOCYTES # BLD AUTO: 1.28 K/UL
LYMPHOCYTES NFR BLD AUTO: 16.1 %
MAN DIFF?: NORMAL
MCHC RBC-ENTMCNC: 31.7 GM/DL
MCHC RBC-ENTMCNC: 32.6 PG
MCV RBC AUTO: 103 FL
MONOCYTES # BLD AUTO: 0.92 K/UL
MONOCYTES NFR BLD AUTO: 11.6 %
NEUTROPHILS # BLD AUTO: 5.24 K/UL
NEUTROPHILS NFR BLD AUTO: 66 %
PLATELET # BLD AUTO: 176 K/UL
POTASSIUM SERPL-SCNC: 4.9 MMOL/L
PROT SERPL-MCNC: 6.8 G/DL
RBC # BLD: 3.34 M/UL
RBC # FLD: 14.1 %
SODIUM SERPL-SCNC: 142 MMOL/L
T4 FREE SERPL-MCNC: 1.2 NG/DL
TRIGL SERPL-MCNC: 38 MG/DL
TSH SERPL-ACNC: 2.48 UIU/ML
WBC # FLD AUTO: 7.94 K/UL

## 2020-04-01 PROBLEM — S42.022A CLOSED DISPLACED FRACTURE OF SHAFT OF LEFT CLAVICLE, INITIAL ENCOUNTER: Status: ACTIVE | Noted: 2020-01-01

## 2020-04-01 PROBLEM — M89.8X1 PAIN OF LEFT CLAVICLE: Status: ACTIVE | Noted: 2020-01-01

## 2020-04-01 NOTE — PHYSICAL EXAM
[de-identified] : Physical examination of the left shoulder discloses soft tissue localized swelling and ecchymosis in the mid clavicular region.  There is a slight prominence.  No focal neurovascular deficits are noted to the left upper extremity.  Mild shoulder restriction secondary to tenderness [de-identified] : X-rays of the left shoulder disclose a midshaft displaced clavicular fracture with bayonet t apposition

## 2020-04-01 NOTE — HISTORY OF PRESENT ILLNESS
[Worsening] : worsening [___ days] : [unfilled] day(s) ago [9] : a maximum pain level of 9/10 [Lifting] : lifting [Constant] : ~He/She~ states the symptoms seem to be constant [None] : No relieving factors are noted [de-identified] : Pt presents for initial evaluation with pain  in his left shoulder Pt is right hand dominant. Pt evidently fell out of bed on 3/28/20. Pt wife spoke with his PCP Dr Sinha and told to follow up with our orthopedic care. Pt is taking 1000 mg Tylenol every 6 hours for pain prn.Pt is ambulating with a walker. Pt has hx of balance issues. PT is taking  Eliqus and Prednisone. There is ecchymosis noted to the left shoulder and a bump over the left clavicle area. Pt states there is no numbness or tingling to the left upper extremity. Pt has a pace maker to the left chest wall. [de-identified] : certain movements

## 2020-04-22 NOTE — HISTORY OF PRESENT ILLNESS
[Stable] : stable [___ wks] : [unfilled] week(s) ago [Intermit.] : ~He/She~ states the symptoms seem to be intermittent [0] : a maximum pain level of 0/10 [Lifting] : worsened by lifting [Rest] : relieved by rest [de-identified] : Pt returns for follow up for his left clavicle fx.  Pt is right hand dominant. Pt is not taking any pain medication. Pt is ambulating with a walker.

## 2020-04-22 NOTE — DISCUSSION/SUMMARY
[de-identified] : Patient was shown his x-rays and advised that further signs of healing and consolidation are noted.  He was given a home exercise program of motion and stretching to the left shoulder.\par \par Follow-up in 2months for reevaluation and repeat x-rays left clavicle

## 2020-04-22 NOTE — PHYSICAL EXAM
[de-identified] : Physical examination of the left shoulder discloses nontender stable range of motion glenohumeral joint.  Mild prominence overlying the fracture site midshaft left clavicle, however this area is nontender to palpation. [de-identified] : XRays disclose maintained position of the clavicular shaft fracture as seen on AP and lordotic views

## 2020-06-03 PROBLEM — S42.022D CLOSED DISPLACED FRACTURE OF SHAFT OF LEFT CLAVICLE WITH ROUTINE HEALING, SUBSEQUENT ENCOUNTER: Status: ACTIVE | Noted: 2020-01-01

## 2020-06-03 NOTE — HISTORY OF PRESENT ILLNESS
[Stable] : stable [0] : a minimum pain level of 0/10 [None] : No relieving factors are noted [de-identified] : Pt returns for follow up for his left clavicle. Pt is no having any pain. Pt is right hand dominant. Pt is moving his left arm well. Pt is using a  walker for ambulation.

## 2020-06-03 NOTE — PHYSICAL EXAM
[de-identified] : XRays disclose fast healing of the midshaft clavicular fracture as seen in AP and lordotic views [de-identified] : Physical examination of the left shoulder region discloses  range of motion essentially nontender including overhead forward abduction and lateral abduction with a palpable callus is noted at the fracture midshaft clavicle

## 2020-06-03 NOTE — DISCUSSION/SUMMARY
[de-identified] : Cipriano is doing well with respect to his clavicle fracture.  He was advised on returning for final visit in approximately 2 to 3 months;\par  x-rays will be taken of the left clavicle at that time

## 2020-06-17 PROBLEM — R29.6 FREQUENT FALLS: Status: ACTIVE | Noted: 2020-01-01

## 2020-06-17 PROBLEM — Z74.09 IMPAIRED FUNCTIONAL MOBILITY, BALANCE, GAIT, AND ENDURANCE: Status: ACTIVE | Noted: 2020-01-01

## 2020-06-17 PROBLEM — Z23 ENCOUNTER FOR IMMUNIZATION: Status: ACTIVE | Noted: 2020-01-01

## 2020-06-17 PROBLEM — R26.81 UNSTEADY GAIT: Status: ACTIVE | Noted: 2017-06-07

## 2020-06-17 NOTE — HEALTH RISK ASSESSMENT
[Fair] :  ~his/her~ mood as fair [No] : No [Two or more falls in past year] : Patient reported two or more falls in the past year [] : No [1] : 1) Little interest or pleasure doing things for several days (1) [0] : 2) Feeling down, depressed, or hopeless: Not at all (0) [[id number]] : 1 [Change in mental status noted] : Change in mental status noted [Behavior] : denies difficulty with behavior [Language] : denies difficulty with language [Learning/Retaining New Information] : difficulty learning/retaining new information [Handling Complex Tasks] : difficulty handling complex tasks [Reasoning] : difficulty with reasoning [None] : None [With Family] : lives with family [Retired] : retired [College] : College [Sexually Active] : not sexually active [Feels Safe at Home] : Feels safe at home [Independent] : feeding [Some assistance needed] : walking [Reports changes in vision] : Reports no changes in vision [Full assistance needed] : managing finances [Reports changes in hearing] : Reports no changes in hearing [Reports changes in dental health] : Reports no changes in dental health [Smoke Detector] : smoke detector [Carbon Monoxide Detector] : carbon monoxide detector [Guns at Home] : no guns at home [Safety elements used in home] : safety elements used in home [Seat Belt] :  uses seat belt

## 2020-06-17 NOTE — HISTORY OF PRESENT ILLNESS
[de-identified] : Annual exam\par Decline all vaccines\par aged out of other health maintenance\par \par \par dementia a little worse\par back on aricept some decrease appetite\par weak inmoorning has increase prednisone to 2 ain am and 1 in pm with improvement\par Some recent fall\par mild black eye and left rib cage injury\par on eliques 2.5 discussed fall risk\par balance issue did better with therapy in past\par some recent swelling of feet\par see cardiology\par urination ok\par Sleeps a lot\par not depressed\par

## 2020-06-17 NOTE — PLAN
[FreeTextEntry1] : Annual exam\par decline vaccine\par \par \par \par \par appetite fair to decrease donepezil to 5 every other day and adjust as necessary\par memory\par falls need therapy\par has increased pred can adjust as necessary for PMR\par afib on eliques\par fall risk discussed\par full blood and urine

## 2020-06-17 NOTE — PHYSICAL EXAM
[Well Nourished] : well nourished [No Acute Distress] : no acute distress [Normal Oropharynx] : the oropharynx was normal [Normal Outer Ear/Nose] : the outer ears and nose were normal in appearance [No JVD] : no jugular venous distention [No Lymphadenopathy] : no lymphadenopathy [No Respiratory Distress] : no respiratory distress  [Normal Rate] : normal rate  [No CVA Tenderness] : no CVA  tenderness [Normal] : soft, non-tender, non-distended, no masses palpated, no HSM and normal bowel sounds [de-identified] : 1 plus edema [de-identified] : afib with syst murmur

## 2020-06-17 NOTE — REVIEW OF SYSTEMS
[Fatigue] : fatigue [Chills] : no chills [Orthopena] : no orthopnea [Chest Pain] : chest pain [Back Pain] : back pain [Joint Pain] : joint pain [Joint Stiffness] : joint stiffness [FreeTextEntry5] : rib injury [Negative] : Respiratory [FreeTextEntry7] : appetite down?

## 2020-09-02 NOTE — ED PROVIDER NOTE - PROGRESS NOTE DETAILS
Elevated troponin on labs - no acute st elevations seen on original or repeat EKG.  Pt continues to deny any pain.  Will repeat troponin.  Given elevated WBC and rising temp, will start abx.  - Evelia Ruvalcaba DO Pt noted to be tachycardic into the 160s.  Pt found to be agitated, but after calming him down, HR returned to low 100s.  EKG shows afib rvr, no STEMI but poor baseline.  - Evelia Ruvalcaba, DO received sign out from Dr Ruvalcaba, pending IS, trauma and ortho consult. pt is on 2 lit NC, and required haloperidol for episodes of sundowning. DJ

## 2020-09-02 NOTE — CONSULT NOTE ADULT - SUBJECTIVE AND OBJECTIVE BOX
HISTORY OF PRESENT ILLNESS:  89M w/ PMH Afib (on Eliquis), s/p CABG, PPM, HTN, dementia, presenting due to fall two days ago. History obtained from wife. Patient fell 2d ago in the morning, unclear mechanism (unwitnessed). States he hit his head but no LOC. Per wife, Eliquis stopped since fall. Wife states patient got more disoriented over the past 2 days which is why they brought him in this AM (A&Ox1 at baseline). Patient was recently hospitalized for an iliac fracture and has been at a rehab center. Denies any chest pain, shortness of breath, cough, fever, chills, n/v/d, or abdominal pain.    PAST MEDICAL HISTORY:   History of polymyalgia rheumatica  HTN (hypertension)  BPH (benign prostatic hyperplasia)  Atrial fibrillation  Macular degeneration      PAST SURGICAL HISTORY:   H/O cardiac pacemaker  S/P CABG x 5      FAMILY HISTORY: No pertinent family history in first degree relatives      SOCIAL HISTORY:    CODE STATUS:     HOME MEDICATIONS:    ALLERGIES: No Known Allergies      VITAL SIGNS:  ICU Vital Signs Last 24 Hrs  T(C): 36.8 (02 Sep 2020 21:14), Max: 37.8 (02 Sep 2020 10:58)  T(F): 98.2 (02 Sep 2020 21:14), Max: 100 (02 Sep 2020 10:58)  HR: 97 (02 Sep 2020 21:14) (72 - 103)  BP: 112/83 (02 Sep 2020 21:14) (105/65 - 139/66)  BP(mean): --  ABP: --  ABP(mean): --  RR: 21 (02 Sep 2020 21:14) (17 - 21)  SpO2: 98% (02 Sep 2020 21:14) (94% - 99%)      NEURO  Exam: oriented to person but not place or time. Follows commands, speaks coherently. Denies any pain.  Meds:    RESPIRATORY    Exam: satting well on RA. Pulling 1000ml on IS  Meds:    CARDIOVASCULAR  VBG - ( 02 Sep 2020 21:14 )  pH: 7.44  /  pCO2: 42    /  pO2: 24    / HCO3: 29    / Base Excess: 4.5   /  SaO2: 38     Lactate: 1.4        Exam: chest without bruising, nontender to palpation  Cardiac Rhythm: regular, reg rate  Meds:    GI/NUTRITION  Exam: soft, nontender, nondistended. Umbilical hernia, reducible  Diet:  Meds:    GENITOURINARY/RENAL  Meds:    09-02 @ 07:01  -  09-02 @ 21:34  --------------------------------------------------------  IN:  Total IN: 0 mL    OUT:    Voided: 250 mL  Total OUT: 250 mL    Total NET: -250 mL        Weight (kg): 68 (09-02 @ 10:42)  09-02    144  |  107  |  36<H>  ----------------------------<  150<H>  4.8   |  24  |  1.08    Ca    10.3      02 Sep 2020 11:20    TPro  6.6  /  Alb  3.6  /  TBili  0.6  /  DBili  x   /  AST  43<H>  /  ALT  42  /  AlkPhos  83  09-02    [ ] Delgado catheter, indication: urine output monitoring in critically ill patient    HEMATOLOGIC  [ ] VTE Prophylaxis:  aspirin  chewable 162 milliGRAM(s) Oral daily                          8.7    10.19 )-----------( 123      ( 02 Sep 2020 21:14 )             27.7     PT/INR - ( 02 Sep 2020 11:20 )   PT: 13.3 sec;   INR: 1.12 ratio         PTT - ( 02 Sep 2020 11:20 )  PTT:23.8 sec  Transfusion: [ ] PRBC	[ ] Platelets	[ ] FFP	[ ] Cryoprecipitate      INFECTIOUS DISEASES  Meds:  RECENT CULTURES:      ENDOCRINE  Meds:  CAPILLARY BLOOD GLUCOSE          PATIENT CARE ACCESS DEVICES:  [ ] Peripheral IV  [ ] Central Venous Line	[ ] R	[ ] L	[ ] IJ	[ ] Fem	[ ] SC	Placed:   [ ] Arterial Line		[ ] R	[ ] L	[ ] Fem	[ ] Rad	[ ] Ax	Placed:   [ ] PICC:					[ ] Mediport  [ ] Urinary Catheter, Date Placed:   [x] Necessity of urinary, arterial, and venous catheters discussed    OTHER MEDICATIONS:     IMAGING STUDIES:    < from: CT Angio Chest w/ IV Cont (09.02.20 @ 13:02) >  FINDINGS:  CHEST:  LUNGS AND LARGE AIRWAYS: Patent central airways. Subsegmental linear atelectasis in the left lung base.  PLEURA: Trace bilateral pleural effusions.  VESSELS: Evaluation of subsegmental pulmonary arteries is suboptimal. No filling defect in the main and segmental pulmonary arteries.  HEART: Heart size is normal. No pericardial effusion.  MEDIASTINUM AND MARIA DEL ROSARIO: No lymphadenopathy.  CHEST WALL AND LOWER NECK: CABG. Multiple acute right rib fractures, including a displaced rib fracture of the right fourth rib. Multiple healing left rib fractures. Multiple additional chronic rib fractures.    ABDOMEN AND PELVIS:  LIVER: Within normal limits.  BILE DUCTS: Normal caliber.  GALLBLADDER: Cholelithiasis.  SPLEEN: Within normal limits.  PANCREAS: Within normal limits.  ADRENALS: Within normal limits.  KIDNEYS/URETERS: Bilateral cysts. No hydronephrosis.    BLADDER: Decompressed with fatty stranding and small amount of hemorrhage in the space of Retzius.  REPRODUCTIVE ORGANS: Prostate is enlarged.    BOWEL: No bowel obstruction. Small hiatal hernia. Appendix is normal.  PERITONEUM: No ascites.  VESSELS: Marked vascular calcifications.  RETROPERITONEUM/LYMPH NODES: No lymphadenopathy.  ABDOMINAL WALL: Within normal limits.  BONES: Osteopenia. Fracture of right inferior pubic ramus, comminuted fractures of the right acetabulum, right pubic bone and nondisplaced fracture of right femoral head. Prior fracture deformity of the right iliac wing with suggestion of superimposed acute fractures. There are associated soft tissue hematomas with enlargement of the right obturator internus muscle and right iliacus muscles. Small amount of hemorrhage in the presacral space with extension into the retroperitoneum bilaterally.    IMPRESSION:  Suboptimal evaluation of the subsegmental arteries. Otherwise, no PE.  Extensive fractures of the right hemipelvis including a nondisplaced fracture of the right femoral head. Associated soft tissue hematomas involving the right obturator internus and iliacus muscles with extension in the retroperitoneum. Multiple acute fractures of the right ribs, including a displaced right fourth rib fracture.  Question of cystitis.    < end of copied text >    < from: Xray Hip 2-3 Views, Right (09.02.20 @ 18:36) >    INTERPRETATION:  Extensive fractures involving the right iliac bone, acetabulum and femur better evaluated on CT abdomen/pelvis performed on the same day.    < end of copied text > HISTORY OF PRESENT ILLNESS:  89M w/ PMH Afib (on Eliquis), s/p CABG, PPM, HTN, dementia, presenting due to fall two days ago. History obtained from wife. Patient fell 2d ago in the morning, unclear mechanism (unwitnessed). States he hit his head but no LOC. Per wife, Eliquis stopped since fall. Wife states patient got more disoriented over the past 2 days which is why they brought him in this AM (A&Ox1 at baseline). Patient was recently hospitalized for an iliac fracture and has been at a rehab center. Denies any chest pain, shortness of breath, cough, fever, chills, n/v/d, or abdominal pain.    PAST MEDICAL HISTORY:   History of polymyalgia rheumatica  HTN (hypertension)  BPH (benign prostatic hyperplasia)  Atrial fibrillation  Macular degeneration      PAST SURGICAL HISTORY:   H/O cardiac pacemaker  S/P CABG x 5      FAMILY HISTORY: No pertinent family history in first degree relatives      SOCIAL HISTORY:    CODE STATUS:     HOME MEDICATIONS:  Eliquis 2.5mg BID (last dose monday)  memantine 10mg BID  Donepezil 5mg daily  Metoprolol 25mg daily  tamsulosin 0.4 daily  Crestor 20mg qhs  finasteride 5mg qhs  Prednisone 1mg x2 AM, 2x PM  Miralax    ALLERGIES: No Known Allergies      VITAL SIGNS:  ICU Vital Signs Last 24 Hrs  T(C): 36.8 (02 Sep 2020 21:14), Max: 37.8 (02 Sep 2020 10:58)  T(F): 98.2 (02 Sep 2020 21:14), Max: 100 (02 Sep 2020 10:58)  HR: 97 (02 Sep 2020 21:14) (72 - 103)  BP: 112/83 (02 Sep 2020 21:14) (105/65 - 139/66)  BP(mean): --  ABP: --  ABP(mean): --  RR: 21 (02 Sep 2020 21:14) (17 - 21)  SpO2: 98% (02 Sep 2020 21:14) (94% - 99%)      NEURO  Exam: oriented to person but not place or time. Follows commands, speaks coherently. Denies any pain.  Meds:    RESPIRATORY    Exam: satting well on RA. Pulling 1000ml on IS  Meds:    CARDIOVASCULAR  VBG - ( 02 Sep 2020 21:14 )  pH: 7.44  /  pCO2: 42    /  pO2: 24    / HCO3: 29    / Base Excess: 4.5   /  SaO2: 38     Lactate: 1.4        Exam: chest without bruising, nontender to palpation  Cardiac Rhythm: regular, reg rate  Meds:    GI/NUTRITION  Exam: soft, nontender, nondistended. Umbilical hernia, reducible  Diet:  Meds:    GENITOURINARY/RENAL  Meds:    09-02 @ 07:01  -  09-02 @ 21:34  --------------------------------------------------------  IN:  Total IN: 0 mL    OUT:    Voided: 250 mL  Total OUT: 250 mL    Total NET: -250 mL        Weight (kg): 68 (09-02 @ 10:42)  09-02    144  |  107  |  36<H>  ----------------------------<  150<H>  4.8   |  24  |  1.08    Ca    10.3      02 Sep 2020 11:20    TPro  6.6  /  Alb  3.6  /  TBili  0.6  /  DBili  x   /  AST  43<H>  /  ALT  42  /  AlkPhos  83  09-02    [ ] Delgado catheter, indication: urine output monitoring in critically ill patient    HEMATOLOGIC  [ ] VTE Prophylaxis:  aspirin  chewable 162 milliGRAM(s) Oral daily                          8.7    10.19 )-----------( 123      ( 02 Sep 2020 21:14 )             27.7     PT/INR - ( 02 Sep 2020 11:20 )   PT: 13.3 sec;   INR: 1.12 ratio         PTT - ( 02 Sep 2020 11:20 )  PTT:23.8 sec  Transfusion: [ ] PRBC	[ ] Platelets	[ ] FFP	[ ] Cryoprecipitate      INFECTIOUS DISEASES  Meds:  RECENT CULTURES:      ENDOCRINE  Meds:  CAPILLARY BLOOD GLUCOSE          PATIENT CARE ACCESS DEVICES:  [ ] Peripheral IV  [ ] Central Venous Line	[ ] R	[ ] L	[ ] IJ	[ ] Fem	[ ] SC	Placed:   [ ] Arterial Line		[ ] R	[ ] L	[ ] Fem	[ ] Rad	[ ] Ax	Placed:   [ ] PICC:					[ ] Mediport  [ ] Urinary Catheter, Date Placed:   [x] Necessity of urinary, arterial, and venous catheters discussed    OTHER MEDICATIONS:     IMAGING STUDIES:    < from: CT Angio Chest w/ IV Cont (09.02.20 @ 13:02) >  FINDINGS:  CHEST:  LUNGS AND LARGE AIRWAYS: Patent central airways. Subsegmental linear atelectasis in the left lung base.  PLEURA: Trace bilateral pleural effusions.  VESSELS: Evaluation of subsegmental pulmonary arteries is suboptimal. No filling defect in the main and segmental pulmonary arteries.  HEART: Heart size is normal. No pericardial effusion.  MEDIASTINUM AND MARIA DEL ROSARIO: No lymphadenopathy.  CHEST WALL AND LOWER NECK: CABG. Multiple acute right rib fractures, including a displaced rib fracture of the right fourth rib. Multiple healing left rib fractures. Multiple additional chronic rib fractures.    ABDOMEN AND PELVIS:  LIVER: Within normal limits.  BILE DUCTS: Normal caliber.  GALLBLADDER: Cholelithiasis.  SPLEEN: Within normal limits.  PANCREAS: Within normal limits.  ADRENALS: Within normal limits.  KIDNEYS/URETERS: Bilateral cysts. No hydronephrosis.    BLADDER: Decompressed with fatty stranding and small amount of hemorrhage in the space of Retzius.  REPRODUCTIVE ORGANS: Prostate is enlarged.    BOWEL: No bowel obstruction. Small hiatal hernia. Appendix is normal.  PERITONEUM: No ascites.  VESSELS: Marked vascular calcifications.  RETROPERITONEUM/LYMPH NODES: No lymphadenopathy.  ABDOMINAL WALL: Within normal limits.  BONES: Osteopenia. Fracture of right inferior pubic ramus, comminuted fractures of the right acetabulum, right pubic bone and nondisplaced fracture of right femoral head. Prior fracture deformity of the right iliac wing with suggestion of superimposed acute fractures. There are associated soft tissue hematomas with enlargement of the right obturator internus muscle and right iliacus muscles. Small amount of hemorrhage in the presacral space with extension into the retroperitoneum bilaterally.    IMPRESSION:  Suboptimal evaluation of the subsegmental arteries. Otherwise, no PE.  Extensive fractures of the right hemipelvis including a nondisplaced fracture of the right femoral head. Associated soft tissue hematomas involving the right obturator internus and iliacus muscles with extension in the retroperitoneum. Multiple acute fractures of the right ribs, including a displaced right fourth rib fracture.  Question of cystitis.    < end of copied text >    < from: Xray Hip 2-3 Views, Right (09.02.20 @ 18:36) >    INTERPRETATION:  Extensive fractures involving the right iliac bone, acetabulum and femur better evaluated on CT abdomen/pelvis performed on the same day.    < end of copied text >

## 2020-09-02 NOTE — H&P ADULT - ASSESSMENT
89M hx a fib on eliquis, HTN, CABG x5, cardiac pacemaker, presenting with fall two days ago with declining mental status, found to have extensive fractures of R hemipelvis including nondisplaced fx of R femoral head, soft tissue hematomas extending into RP, multiple acute fx of R ribs, including displaced R 4th rib fx.     PLAN:  - Multimodal pain control: IV acetaminophen, lidocaine patch  - Diet: NPO + IVF   - Activity: OOB, IS   - Heme: SCDs b/l for DVT ppx, home eliquis held in context of possible hemorrhage   - Strict I's/O's  - q1 vitals, hemorrhage watch protocol, cont. trend CBC   - Admit to [facility]   - F/u ortho   - Plan to discuss with Dr. Arora 89M hx a fib on eliquis, HTN, CABG x5, cardiac pacemaker, presenting with fall two days ago with declining mental status, found to have extensive fractures of R hemipelvis including nondisplaced fx of R femoral head, soft tissue hematomas extending into RP, multiple acute fx of R ribs, including displaced R 4th rib fx.     PLAN:  - Multimodal pain control: IV acetaminophen, lidocaine patch  - Diet: NPO + IVF   - Activity: OOB, IS   - Heme: SCDs b/l for DVT ppx, home eliquis held in context of possible hemorrhage   - Strict I's/O's  - q1 vitals, hemorrhage watch protocol, cont. trend CBC   - Admit to [facility]   - F/u ortho for extensive R pelvis and R rib fractures   - Plan to discuss with Dr. Arora Patient is a 89 year old male with a PMH of afib (on Eliquis), HTN, s/p CABG, PPM, and dementia found to have extensive fractures of R hemipelvis including nondisplaced fx of R femoral head, soft tissue hematomas extending into RP, multiple acute fx of R ribs, including displaced R 4th rib fx.     PLAN:  - Multimodal pain control: IV acetaminophen, lidocaine patch  - Incentive spirometer  - Diet: NPO + IVF   - Activity: OOB, IS   - hemorrhage watch protocol  - Strict I's/O's  - q1 vitals, hemorrhage watch protocol, cont. trend CBC   - F/u ortho for extensive R pelvis and R rib fractures   - Plan discussed with Dr. Maite Carrillo PGY4  [facility][id number]

## 2020-09-02 NOTE — ED PROVIDER NOTE - OBJECTIVE STATEMENT
89M pmh afib on eliquis, CABG, PPM, HTN, BPH, and mild dementia p/w constipation and fall 2 days ago. 89M pmh afib on eliquis, CABG, PPM, HTN, BPH, and mild dementia p/w constipation and fall 2 days ago. Patient states he fell and hit the back of his head with no LOC. Per wife, patient stopped eliquis for the past few days. Patient was recently hospitalized for an iliac fracture and has been at a rehab center. Denies any chest pain, shortness of breath, cough, fever, chills, n/v/d, or abdominal pain.

## 2020-09-02 NOTE — ED ADULT NURSE REASSESSMENT NOTE - NS ED NURSE REASSESS COMMENT FT1
RN return from break- pt no longer in ED. surgery resident approached desk asking for pt chart. sx resident states he took pt up to SICU accompanied by 1:1 PCA. pt not on monitor during transport. RN not notified of pt transport prior to leaving ED.

## 2020-09-02 NOTE — ED ADULT NURSE REASSESSMENT NOTE - NS ED NURSE REASSESS COMMENT FT1
Pt attempted to climb out of bed, received 2.5mg Haldol IM per MD orders, pt resting in bed on constant observation, VSS.

## 2020-09-02 NOTE — ED PROVIDER NOTE - CLINICAL SUMMARY MEDICAL DECISION MAKING FREE TEXT BOX
89M pmh afib on eliquis, CABG, PPM, HTN, BPH, and mild dementia p/w constipation and fall 2 days ago.  Pt found to be mildly hypoxic in ER.  Pt denies any pain.  Abd soft ntnd, ecchymosis and dried abrasion to right parietal region, PERRL, EOMI, heart irregular, equal breath sounds.  concern for possible infectious vs electrolyte abnormality, possible intracranial pathology given recent head trauma, unclear etiology of hypoxia - will check labs, ua, CTs and likely admission.

## 2020-09-02 NOTE — ED ADULT NURSE REASSESSMENT NOTE - NS ED NURSE REASSESS COMMENT FT1
Pt assisted to use urinal- 100cc output, pt slightly agitated- taking off clothes, ripping off bp cuff and o2 sat. MD calvo aware, awaiting orders.

## 2020-09-02 NOTE — H&P ADULT - NSHPLABSRESULTS_GEN_ALL_CORE
CBC Full  -  ( 02 Sep 2020 21:14 )  WBC Count : 10.19 K/uL  RBC Count : 2.72 M/uL  Hemoglobin : 8.7 g/dL  Hematocrit : 27.7 %  Platelet Count - Automated : 123 K/uL  Mean Cell Volume : 101.8 fl  Mean Cell Hemoglobin : 32.0 pg  Mean Cell Hemoglobin Concentration : 31.4 gm/dL  Auto Neutrophil # : x  Auto Lymphocyte # : x  Auto Monocyte # : x  Auto Eosinophil # : x  Auto Basophil # : x  Auto Neutrophil % : x  Auto Lymphocyte % : x  Auto Monocyte % : x  Auto Eosinophil % : x  Auto Basophil % : x        143  |  108  |  32<H>  ----------------------------<  119<H>  4.3   |  25  |  0.93    Ca    9.5      02 Sep 2020 21:14  Phos  2.1       Mg     2.1         TPro  6.6  /  Alb  3.6  /  TBili  0.6  /  DBili  x   /  AST  43<H>  /  ALT  42  /  AlkPhos  83        Urinalysis Basic - ( 02 Sep 2020 12:12 )    Color: Light Yellow / Appearance: Clear / S.028 / pH: x  Gluc: x / Ketone: Negative  / Bili: Negative / Urobili: Negative   Blood: x / Protein: 30 mg/dL / Nitrite: Negative   Leuk Esterase: Negative / RBC: 2 /hpf / WBC 1 /HPF   Sq Epi: x / Non Sq Epi: 1 /hpf / Bacteria: Negative    COVID-19 PCR: NotDetec (02 Sep 2020 11:30)    < from: CT Abdomen and Pelvis w/ IV Cont (20 @ 13:39) >    IMPRESSION:  Suboptimal evaluation of the subsegmental arteries. Otherwise, no PE.  Extensive fractures of the right hemipelvis including a nondisplaced fracture of the right femoral head. Associated soft tissue hematomas involving the right obturator internus and iliacus muscles with extension in the retroperitoneum. Multiple acute fractures of the right ribs, including a displaced right fourth rib fracture.  Question of cystitis.    < end of copied text >    < from: CT Head No Cont (20 @ 13:38) >    IMPRESSION:    CT BRAIN:  No acute intracranial hemorrhage, brain edema, or mass effect. No displaced calvarial fracture.  No appreciable scalp hematoma.    CT CERVICAL SPINE:  No acute fracture or traumatic subluxation.  No prevertebral soft tissue swelling.  Degenerative changes.    < end of copied text >    < from: Xray Hip 2-3 Views, Right (20 @ 18:36) >    INTERPRETATION:  Extensive fractures involving the right iliac bone, acetabulum and femur better evaluated on CT abdomen/pelvis performed on the same day.    < end of copied text >

## 2020-09-02 NOTE — H&P ADULT - HISTORY OF PRESENT ILLNESS
89M with hx of a fib (on Eliquis), HTN, s/p CABG x5 and cardiac pacemaker, PMR, and mild dementia (AOX1 at baseline) presenting to ED s/p unwitnessed fall two days ago. Per patient's wife, patient fell 2 days ago in the morning and hit his head with no LOC. Patient has become more disoriented since the past few days, prompting trip to ED. Of note, patient has been at rehab s/p hospitalization for iliac fracture. He has stopped taking home eliquis since his fall. On ROS, patient denying... 89M with hx of a fib (on Eliquis), HTN, s/p CABG x5 and cardiac pacemaker, PMR, and mild dementia (AOX1 at baseline) presenting to ED s/p unwitnessed fall two days ago. Per patient's wife, patient fell 2 days ago in the morning and hit his head with no LOC. Patient has become more disoriented since the past few days, prompting trip to ED. Of note, patient has been at rehab s/p hospitalization for iliac fracture. Per wife, has stopped taking home eliquis since his fall. On interview, patient denying any pain, dizziness, SOB, palpitations, n/v/d. Patient is a 89 year old male with a PMH of afib (on Eliquis), HTN, s/p CABG, PPM, and dementia presenting to ED s/p unwitnessed fall two days ago. Per patient's wife, patient fell 2 days ago in the morning and hit his head with no LOC. Patient has become more agitated over the past few days, prompting trip to ED. Of note, patient has been at rehab s/p hospitalization for iliac fracture in November of 2019. Per wife, he stopped taking home eliquis since his fall 2 days ago. On interview, patient denying any pain, dizziness, SOB, palpitations, n/v/d.

## 2020-09-02 NOTE — ED ADULT NURSE NOTE - STOOL PATTERN
Nurse's Notes                                                                                     

 [facility]                                                                

Name: Dariel Reyes                                                                                

Age: 13 yrs                                                                                       

Sex: Male                                                                                         

: 2005                                                                                   

MRN: [id number]                                                                                   

Arrival Date: 2019                                                                          

Time: 06:56                                                                                       

Account#: [id number]                                                                            

Bed 13                                                                                            

Private MD: Ashley Pickett L                                                                     

Diagnosis: Nausea with vomiting, unspecified                                                      

                                                                                                  

Presentation:                                                                                     

                                                                                             

07:01 Presenting complaint: Mother states: vomiting that began today at 0500. Pt denies       aa5 

      diarrhea. Pt reports abd pain to umbilical area. Denies sore throat, denies fever.          

      Transition of care: patient was not received from another setting of care. Onset of         

      symptoms was 2019. Risk Assessment: Do you want to hurt yourself or someone       

      else? Patient reports no desire to harm self or others. Care prior to arrival: None.        

07:01 Method Of Arrival: Ambulatory                                                           aa5 

07:01 Acuity: JEANNE 3                                                                           aa5 

                                                                                                  

Historical:                                                                                       

- Allergies:                                                                                      

07:02 No Known Allergies;                                                                     aa5 

- Home Meds:                                                                                      

07:02 Adderall XR 10 mg Oral cp24 1 cap once daily [Active];                                  aa5 

- PMHx:                                                                                           

07:02 ADD/ADHD;                                                                               aa5 

- PSHx:                                                                                           

07:02 None;                                                                                   aa5 

                                                                                                  

- Immunization history:: Childhood immunizations are up to date.                                  

- Social history:: Smoking status: Patient/guardian denies using tobacco.                         

- Ebola Screening: : No symptoms or risks identified at this time.                                

                                                                                                  

                                                                                                  

Screenin:49 Abuse screen: Denies threats or abuse. Nutritional screening: No deficits noted.        ls4 

      Tuberculosis screening: No symptoms or risk factors identified.                             

08:49 Pedi Fall Risk Total Score: 0-1 Points : Low Risk for Falls.                            ls4 

                                                                                                  

      Fall Risk Scale Score:                                                                      

08:49 Mobility: Ambulatory with no gait disturbance (0); Mentation: Developmentally           ls4 

      appropriate and alert (0); Elimination: Diapers (0); Hx of Falls: No (0); Current Meds:     

      No (0); Total Score: 0                                                                      

Assessment:                                                                                       

07:59 General: Appears uncomfortable, Behavior is calm, cooperative. Pain: Denies pain. GI:   ls4 

      Abdomen is flat, non-distended, Bowel sounds present X 4 quads. Abd is soft and non         

      tender X 4 quads. Reports intolerance of fluids.                                            

08:48 Reassessment: Patient appears in no apparent distress at this time. Patient is          ls4 

      alert/active/playful, equal unlabored respirations, skin warm/dry/pink. PT DISCHARGED,      

      FLUIDS INFUSING. AWAITING COMPLETION OF ORDERED FLUIDS.                                     

                                                                                                  

Vital Signs:                                                                                      

07:02  / 79; Pulse 91; Resp 18 S; Temp 97.6(TE); Pulse Ox 100% on R/A;                  aa5 

07:04 Weight 31.89 kg (M);                                                                    aa5 

                                                                                                  

ED Course:                                                                                        

06:56 Patient arrived in ED.                                                                  ds1 

06:56 Ashley Pickett MD is Private Physician.                                                ds1 

07:00 [facility]                                                        kb  

07:00 Eleazar Carmona MD is Attending Physician.                                             kb  

07:01 Arm band placed on.                                                                     aa5 

07:02 Triage completed.                                                                       aa5 

07:12 Izabel Lee, RN is Primary Nurse.                                                     ls4 

07:30 Patient has correct armband on for positive identification. Bed in low position. Call   ls4 

      light in reach. Side rails up X 1.                                                          

07:41 No provider procedures requiring assistance completed. Initial lab(s) drawn, by me,     ls4 

      sent to lab. Inserted saline lock: 24 gauge in left antecubital area, using aseptic         

      technique. Blood collected.                                                                 

08:35 Diet: Patient given juice. Tolerated well.                                              ls4 

08:39 Ashley Pickett MD is Referral Physician.                                               kb  

                                                                                                  

Administered Medications:                                                                         

07:40 Drug: NS 0.9% (20 ml/kg) 20 ml/kg Route: IV; Rate: 1 bolus; Site: left antecubital;     ls4 

07:40 Drug: Zofran 4 mg Route: IVP; Site: left antecubital;                                   ls4 

08:15 Follow up: Response: No adverse reaction                                                ls4 

                                                                                                  

                                                                                                  

Outcome:                                                                                          

08:39 Discharge ordered by MD.                                                                kb  

09:56 Patient left the ED.                                                                    ls4 

                                                                                                  

Signatures:                                                                                       

Hellen Brush FNP-C FNP-Megan Cao                                ds1                                                  

Hanna Lambert, RN                     RN   aa5                                                  

Izabel Lee, RN                       RN   ls4                                                  

                                                                                                  

************************************************************************************************** constipation

## 2020-09-02 NOTE — ED PROVIDER NOTE - PMH
Atrial fibrillation    BPH (benign prostatic hyperplasia)    History of polymyalgia rheumatica  this is why patient is on chronic prednisone  HTN (hypertension)

## 2020-09-02 NOTE — ED ADULT TRIAGE NOTE - CCCP TRG CHIEF CMPLNT
Constipation also complaining of Fall on monday hit head no LOC, patient on eliquis/chronic right hip pain

## 2020-09-02 NOTE — H&P ADULT - NSHPPHYSICALEXAM_GEN_ALL_CORE
Constitutional:    Eyes:    ENMT:    Neck:    Back:    Respiratory:    Cardiovascular:    Abdomen:     Extremities:    Vascular:    Neurological:    Skin:    Lymph Nodes:    Musculoskeletal:    Psychiatric: Vital Signs Last 24 Hrs  T(C): 37.3 (02 Sep 2020 22:15), Max: 37.8 (02 Sep 2020 10:58)  T(F): 99.1 (02 Sep 2020 22:15), Max: 100 (02 Sep 2020 10:58)  HR: 94 (02 Sep 2020 22:15) (72 - 103)  BP: 126/77 (02 Sep 2020 22:15) (105/65 - 139/66)  BP(mean): 94 (02 Sep 2020 22:15) (94 - 94)  RR: 25 (02 Sep 2020 22:15) (17 - 25)  SpO2: 98% (02 Sep 2020 22:15) (94% - 99%)    Constitutional:    Eyes:    ENMT:    Neck:    Back:    Respiratory:    Cardiovascular:    Abdomen:     Extremities:    Vascular:    Neurological:    Skin:    Lymph Nodes:    Musculoskeletal:    Psychiatric: Vital Signs Last 24 Hrs  T(C): 37.3 (02 Sep 2020 22:15), Max: 37.8 (02 Sep 2020 10:58)  T(F): 99.1 (02 Sep 2020 22:15), Max: 100 (02 Sep 2020 10:58)  HR: 94 (02 Sep 2020 22:15) (72 - 103)  BP: 126/77 (02 Sep 2020 22:15) (105/65 - 139/66)  BP(mean): 94 (02 Sep 2020 22:15) (94 - 94)  RR: 25 (02 Sep 2020 22:15) (17 - 25)  SpO2: 98% (02 Sep 2020 22:15) (94% - 99%)    Constitutional: NAD, lying in bed, drowsy, difficult to rouse  Eyes: PERRLA, EOMI, clear bilaterally   ENMT: MMM, airway patent   Neck: supple, full ROM, no lymphadenopathy   Chest: clear to auscultation bilaterally; no bruising, no tenderness to palpation   Respiratory: good inspiratory effort, breathing comfortably on RA, pulling 1000 IS   Cardiovascular: RRR, S1/S2  Abdomen: Soft, nondistended, nontender; hernia soft and reducible   Extremities: WWP, distal pulses full   Neurological: AOx1, to person   Skin: Ecchymoses present on forearms; skin intact   Lymph Nodes: no lymphadenopathy appreciated   Musculoskeletal: Vital Signs Last 24 Hrs  T(C): 37.3 (02 Sep 2020 22:15), Max: 37.8 (02 Sep 2020 10:58)  T(F): 99.1 (02 Sep 2020 22:15), Max: 100 (02 Sep 2020 10:58)  HR: 94 (02 Sep 2020 22:15) (72 - 103)  BP: 126/77 (02 Sep 2020 22:15) (105/65 - 139/66)  BP(mean): 94 (02 Sep 2020 22:15) (94 - 94)  RR: 25 (02 Sep 2020 22:15) (17 - 25)  SpO2: 98% (02 Sep 2020 22:15) (94% - 99%)    Constitutional: NAD, lying in bed comfortably  Eyes: clear bilaterally   Neck: supple  Chest: no accessory muscle use, left sided chest wall tenderness  Respiratory: good inspiratory effort, breathing comfortably on RA, pulling 1000 IS   Cardiovascular: Regular rate  Abdomen: Soft, nondistended, nontender; hernia soft and reducible    Neurological: AOx1, to person

## 2020-09-02 NOTE — ED ADULT NURSE NOTE - CHPI ED NUR SYMPTOMS NEG
no bleeding/no tingling/no numbness/no loss of consciousness/no abrasion/no deformity/no vomiting/no weakness

## 2020-09-02 NOTE — ED ADULT NURSE NOTE - OBJECTIVE STATEMENT
89y M presents to ED by EMS after wife called reporting pt is constipated. Per EMS report, pt fell on monday. Pt on eliquis. 89y M A&Ox1 baseline, presents to ED by EMS after wife called reporting pt is constipated. Per EMS report, pt fell and hit back of head on monday- no LOC. PMH- afib on eliquis, cabg, htn, bph, chronic right hip pain. A&Ox1, breathing unlabored, abd soft nontender nondistended, umbilical hernia present, positive pedal pulses, strength and sensation maintained in all extremities. Pt denies headache, dizziness, numbness/tingling, cp, sob, abd pain.

## 2020-09-02 NOTE — ED ADULT NURSE REASSESSMENT NOTE - NS ED NURSE REASSESS COMMENT FT1
Straight cathed pt, 500 cc of yellow clear urine obtained. Urine sample sent to lab. Pt tolerated well. Sterile technique used and maintained. 2 staff members present. MD aware.

## 2020-09-03 NOTE — CONSULT NOTE ADULT - ATTENDING COMMENTS
Strongly favor nonoperative treatment. ORIF would be a major trans-abdominal operation with significant bleeding risk and likely failure due to comminution and osteopenia secondary to age and chronic steroid use.    DVT prophylaxis when appropriate re bleeding, out of bed to chair when comfort allows.

## 2020-09-03 NOTE — CONSULT NOTE ADULT - PROBLEM SELECTOR RECOMMENDATION 3
Agitation likely related to underlying dementia and exacerbated by acute medical condition and pain. Suspect delirium.  1. UA not suggestive of UTI. No significant metabolic derangement noted on testing.  2. Resume home dose of memantine 10 mg PO BID.  3. Pain control as above.  4. Fall/aspiration precautions.  5. Limit sedatives as able.  6. Frequent reorientation.  7. Maintain sleep-wake cycle. Consider melatonin qhs.

## 2020-09-03 NOTE — CONSULT NOTE ADULT - PROBLEM SELECTOR RECOMMENDATION 2
Appears stable based on H/H.  1. Continue to hold eliquis.  2. Trend CBC; if dropping, obtain repeat abdominal imaging

## 2020-09-03 NOTE — PROGRESS NOTE ADULT - SUBJECTIVE AND OBJECTIVE BOX
24 HOUR EVENTS:  -Admitted to SICU for fall  -CT with extensive fractures, soft tissue and RP hematoma: hemorrhage watch    SUBJECTIVE/ROS:  [X ] A ten-point review of systems was otherwise negative except as noted.  [ ] Due to altered mental status/intubation, subjective information were not able to be obtained from the patient. History was obtained, to the extent possible, from review of the chart and collateral sources of information.      NEURO  Exam: A&Ox1, responsive to voice, follows commands. Reports no pain  Meds: acetaminophen  IVPB .. 1000 milliGRAM(s) IV Intermittent every 6 hours    [x] Adequacy of sedation and pain control has been assessed and adjusted      RESPIRATORY  RR: 20 (09-03-20 @ 05:00) (9 - 36)  SpO2: 99% (09-03-20 @ 05:00) (91% - 99%)  Wt(kg): --  Exam: unlabored, clear to auscultation bilaterally      CARDIOVASCULAR  HR: 91 (09-03-20 @ 05:00) (72 - 103)  BP: 103/63 (09-03-20 @ 05:00) (75/45 - 154/70)  BP(mean): 78 (09-03-20 @ 05:00) (55 - 101)  ABP: --  ABP(mean): --  Wt(kg): --  CVP(cm H2O): --  VBG - ( 03 Sep 2020 02:43 )  pH: 7.40  /  pCO2: 48    /  pO2: 27    / HCO3: 29    / Base Excess: 4.3   /  SaO2: 46     Lactate: 1.1        Exam: RRR  Cardiac Rhythm: sinus  Perfusion     [x]Adequate   [ ]Inadequate  Mentation   [x ]Normal       [ ]Reduced  Extremities  [x ]Warm         [ ]Cool  Volume Status [ ]Hypervolemic [x ]Euvolemic [ ]Hypovolemic  Meds:       GI/NUTRITION  Exam: abdomen soft, tender, nondistended  Diet: NPO  Meds:     GENITOURINARY  I&O's Detail    09-02 @ 07:01  -  09-03 @ 05:26  --------------------------------------------------------  IN:    IV PiggyBack: 200 mL    lactated ringers.: 375 mL    Solution: 62.5 mL  Total IN: 637.5 mL    OUT:    Voided: 350 mL  Total OUT: 350 mL    Total NET: 287.5 mL        Weight (kg): 71.3 (09-02 @ 22:15)  09-03    142  |  110<H>  |  32<H>  ----------------------------<  106<H>  4.8   |  24  |  0.93    Ca    9.5      03 Sep 2020 02:45  Phos  2.6     09-03  Mg     2.1     09-03    TPro  6.6  /  Alb  3.6  /  TBili  0.6  /  DBili  x   /  AST  43<H>  /  ALT  42  /  AlkPhos  83  09-02    [ ] Delgado catheter, indication: N/A  Meds: lactated ringers. 1000 milliLiter(s) IV Continuous <Continuous>        HEMATOLOGIC  Meds:   [ ] VTE Prophylaxis                        8.2    10.03 )-----------( 116      ( 03 Sep 2020 02:45 )             25.6     PT/INR - ( 03 Sep 2020 02:45 )   PT: 13.7 sec;   INR: 1.16 ratio         PTT - ( 03 Sep 2020 02:45 )  PTT:20.0 sec  Transfusion     [ ] PRBC   [ ] Platelets   [ ] FFP   [ ] Cryoprecipitate      INFECTIOUS DISEASES  T(C): 36.8 (09-03-20 @ 03:00), Max: 37.8 (09-02-20 @ 10:58)  Wt(kg): --  WBC Count: 10.03 K/uL (09-03 @ 02:45)  WBC Count: 10.92 K/uL (09-02 @ 23:50)  WBC Count: 11.05 K/uL (09-02 @ 22:37)  WBC Count: 10.19 K/uL (09-02 @ 21:14)  WBC Count: 11.56 K/uL (09-02 @ 11:20)    Recent Cultures:    Meds:       ENDOCRINE  Capillary Blood Glucose    Meds:       ACCESS DEVICES:  [X ] Peripheral IV  [ ] Central Venous Line	[ ] R	[ ] L	[ ] IJ	[ ] Fem	[ ] SC	Placed:   [ ] Arterial Line		[ ] R	[ ] L	[ ] Fem	[ ] Rad	[ ] Ax	Placed:   [ ] PICC:					[ ] Mediport  [ ] Urinary Catheter, Date Placed:   [ ] Necessity of urinary, arterial, and venous catheters discussed    OTHER MEDICATIONS:  lidocaine   Patch 2 Patch Transdermal daily      CODE STATUS: full code    IMAGING:

## 2020-09-03 NOTE — PROGRESS NOTE ADULT - ASSESSMENT
ASSESSMENT: 89yMale w/ PMH Afib (on Eliquis), s/p CABG, PPM, HTN, dementia, presenting due to fall two days ago. CT findings with extensive fractures of the R hemipelvis with nondisplaced fx of R femoral head, associated soft tissue hematomas involving R obturator internus and iliacus muscles, with extension into RP. Multiple R rib fx.    PLAN:   Neuro  -A&Ox1 at baseline  -Pain: not reporting any pain. Tylenol    Resp  -Satting well on RA    CV  -Hx HTN. Holding home meds  -Lactate 1.4    GI  -NPO    /Renal  -s/p 500cc bolus NS  -IVF: LR @75cc/hr    Heme  -Hematoma in presacral space, hemorrhage watch. Continue to watch H/H trend CBC  -Holding DVT ppx & eliquis    ID: [id number]    Endo: CLAY    MSK  -R 1-6 rib fx, L 10, 11 fx   -R inferior pubic ramus fx, R comminuted acetabulum fx, prior fx of R iliac wing with possible acute fx. R RP hematoma  -Per ortho, weight bearing as tolerated    Dispo: SICU

## 2020-09-03 NOTE — CONSULT NOTE ADULT - PROBLEM SELECTOR RECOMMENDATION 5
1. Not on ASA at home and would not start now.  2. Resume home dose crestor 20 mg PO qhs or formulary equivalent.  3. Beta blocker as above.  4. EKG reviewed and not consistent with an acute coronary syndrome.  5. Has significant murmur on exam, small effusions on imaging, and known history of AS - recommend judicious use of IVF while NPO. Favor PO over IV hydration if no plan for OR.

## 2020-09-03 NOTE — CHART NOTE - NSCHARTNOTEFT_GEN_A_CORE
Discussed plan of care and current findings with patient's wife over the phone earlier this afternoon. Her preference is for [facility] placement upon discharge. Was unable to discuss advance directives at this time.    Patient's PMD, Dr. Murrieta, also made aware of admission and plan of care.

## 2020-09-03 NOTE — PROGRESS NOTE ADULT - SUBJECTIVE AND OBJECTIVE BOX
Trauma/SICU Attending  89 year old male who fell with resulting right retroperitoneal hematoma and right femoral head fracture as well as multiple right rib fractures on hemorrhage watch. The Hct has been stable. WIll continue to monitor CBC on him closely.

## 2020-09-03 NOTE — CONSULT NOTE ADULT - PROBLEM SELECTOR RECOMMENDATION 9
as described above.  1. Multimodal pain control with tylenol 1000 mg PO TID, lidoderm patches. Would avoid NSAIDs given bleeding and relative thrombocytopenia. If more significant pain control is needed, would consider either tramadol or low dose oxycodone (2.5) on a PRN basis.  2. Bowel regimen  3. Orthopedics follow-up - no plan for OR at this time.  4. OOB to chair when OK by orthopedics.  5. Eventual PT/OT evaluations.  6. Start vitamin D 1000 IU PO daily for presumed osteoporosis.  7. Incentive spirometry.  8. Monitor O2 saturations. Will reach out to wife later today to provide update as to hospital course and attempt to obtain further direction regarding overall goals of care.

## 2020-09-03 NOTE — CONSULT NOTE ADULT - SUBJECTIVE AND OBJECTIVE BOX
TRAUMA/ACUTE CARE SURGERY - [facility] MEDICINE CO-MANAGEMENT INITIAL VISIT NOTE  Tin Stephenson MD, FACP, [facility]  Pager: [phone]  Spectralink: [id number]    Collateral history obtained from medical record, including outpatient records, given patient's dementia.    CHIEF COMPLAINT: Patient is a 89y old  Male who presents with a chief complaint of AMS s/p unwitnessed fall (03 Sep 2020 05:25)    HPI: Mr. Arce is a 89 year old man with a PMHz of moderate dementia (A+O x 1-2 at baseline), AF on Eliquis s/p PPM, CAD s/p CABG, aortic stenosis, polymyalgia rheumatica, HTN, and HLD who presented to the ED s/p fall on . The patient does not recall that he fell or what may have precipitated his fall. Per chart review, there was a head strike associated with the fall, and he hit the back of his head. He was recently at a rehabilitation facility for an iliac fracture as well. The patient's eliquis was held after the falls, but he was noted to have increasing confusions/agitation thereafter, so was brought to the ED for evaluation.  At the present time, he reports no symptoms. He states he has no pain in his chest, head, or abdomen. Denies blurred vision. Denies headache. Denies nausea/vomiting. Denies fever/chills. ROS limited given history of dementia.  Work-up here notable for multiple R pelvic fractures, R femoral head fracture with associated hematoma, and multiple R sided rib fractures. There was no intracranial hemorrhage.  Per RN - some episodes of confusion/agitation overnight    History limited due to: [x] Dementia  [ ] Delirium  [ ] Condition    Allergies: No Known Allergies    Intolerances: Unknown        HOME MEDICATIONS: [x] Reviewed with outpatient medical record from Dr. Murrieta's office  Home Medications:  Eliquis 2.5 mg oral tablet: 1 tab(s) orally 2 times a day (03 Sep 2020 09:29)  finasteride 5 mg oral tablet: 1 tab(s) orally once a day (03 Sep 2020 09:29)  memantine 10 mg oral tablet: 1 tab(s) orally 2 times a day (03 Sep 2020 09:29)  metoprolol succinate 25 mg oral tablet, extended release: 1 tab(s) orally once a day (03 Sep 2020 09:29)  predniSONE 1 mg oral tablet: 1 tab(s) orally in AM and 2 tabs orally in PM (03 Sep 2020 09:29)  rosuvastatin 20 mg oral tablet: 1 tab(s) orally once a day (03 Sep 2020 09:29)  tamsulosin 0.4 mg oral capsule: 1 cap(s) orally once a day (03 Sep 2020 09:29)      MEDICATIONS  (STANDING):  acetaminophen  IVPB .. 1000 milliGRAM(s) IV Intermittent every 6 hours  lactated ringers. 1000 milliLiter(s) (75 mL/Hr) IV Continuous <Continuous>  lidocaine   Patch 2 Patch Transdermal daily    MEDICATIONS  (PRN):      PAST MEDICAL & SURGICAL HISTORY:  History of polymyalgia rheumatica: this is why patient is on chronic prednisone  HTN (hypertension)  BPH (benign prostatic hyperplasia)  Atrial fibrillation  CAD  Aortic Stenosis  HLD  H/O cardiac pacemaker  S/P CABG x 5  [x] Reviewed and reconciled with outpatient record    Functional Assessment: [ ] Independent  [x] Assistance  [ ] Total care  [ ] Non-ambulatory    SOCIAL HISTORY:  Residence: [ ] [facility]  [ ] SNF  [x] Community  [ ] Substance abuse: Denies  [x] Tobacco: Former smoker per chart review  [ ] Alcohol use: Denies    FAMILY HISTORY:  AAA in Father    REVIEW OF SYSTEMS:    CONSTITUTIONAL: No fever or fatigue  EYES: No eye pain, visual disturbances, or discharge  ENMT:  + difficulty hearing, tinnitus, no vertigo; No sinus or throat pain  NECK: No pain or stiffness  RESPIRATORY: No cough, wheezing, chills or hemoptysis; No shortness of breath  CARDIOVASCULAR: No chest pain, palpitations, dizziness, or leg swelling  GASTROINTESTINAL: No abdominal or epigastric pain. No nausea, vomiting, or hematemesis; No diarrhea or constipation. No melena or hematochezia.  GENITOURINARY: No dysuria, frequency, hematuria, or incontinence  NEUROLOGICAL: No headaches, loss of strength, numbness, or tremors. + chronic memory loss  SKIN: No itching, burning, rashes, or lesions   LYMPH NODES: No enlarged glands  MUSCULOSKELETAL: See HPI. No muscle or back pain at this time.  PSYCHIATRIC: No depression, anxiety, mood swings, or difficulty sleeping  HEME/LYMPH: No easy bruising, or bleeding gums  ALLERGY AND IMMUNOLOGIC: No hives or eczema    [  ] All other ROS negative  [  ] Unable to obtain due to poor mental status    PHYSICAL EXAM:    Vital Signs Last 24 Hrs  T(C): 36.8 (03 Sep 2020 03:00), Max: 37.8 (02 Sep 2020 10:58)  T(F): 98.2 (03 Sep 2020 03:00), Max: 100 (02 Sep 2020 10:58)  HR: 90 (03 Sep 2020 09:00) (72 - 103)  BP: 127/68 (03 Sep 2020 09:00) (75/45 - 154/70)  BP(mean): 90 (03 Sep 2020 09:00) (55 - 101)  RR: 18 (03 Sep 2020 09:00) (9 - 36)  SpO2: 95% (03 Sep 2020 09:00) (91% - 99%)    GENERAL: Frail/cachectic appearing man in NAD, resting comfortably in bed in SICU  HEAD:  Atraumatic, Normocephalic  EYES: EOMI, pupils constricted b/l and equal, conjunctiva and sclera clear  ENMT: Moist mucous membranes  NECK: Supple, No JVD  RESPIRATORY: Clear to auscultation bilaterally anteriorly; No rales, rhonchi, wheezing, or rubs  CARDIOVASCULAR: Irregularly irregular; + crescendo-decrescendo systolic murmur, loudest in aortic area. No rubs or gallops  GASTROINTESTINAL: Soft, Nontender, Nondistended; Bowel sounds present  GENITOURINARY: + Delgado catheter with clear urine output  EXTREMITIES:  2+ Peripheral Pulses, No clubbing, cyanosis, or edema  MSK: No tenderness to palpation over lateral or anterior R chest wall. No tenderness to palpation over R hip and no obvious swelling noted.  NERVOUS SYSTEM:  Alert & Oriented to person, month, day but not year; Moving all 4 extremities; No gross sensory deficits  HEME/LYMPH: No lymphadenopathy noted  SKIN: No rashes or lesions    LABS:                        8.2    10.03 )-----------( 116      ( 03 Sep 2020 02:45 )             25.6     Hemoglobin: 8.2 g/dL ( @ 02:45)  Hemoglobin: 8.4 g/dL ( @ 23:50)  Hemoglobin: 9.4 g/dL ( @ 22:37)  Hemoglobin: 8.7 g/dL ( @ 21:14)  Hemoglobin: 9.7 g/dL ( @ 11:20)        142  |  110<H>  |  32<H>  ----------------------------<  106<H>  4.8   |  24  |  0.93    Ca    9.5      03 Sep 2020 02:45  Phos  2.6       Mg     2.1         TPro  6.6  /  Alb  3.6  /  TBili  0.6  /  DBili  x   /  AST  43<H>  /  ALT  42  /  AlkPhos  83      PT/INR - ( 03 Sep 2020 02:45 )   PT: 13.7 sec;   INR: 1.16 ratio         PTT - ( 03 Sep 2020 02:45 )  PTT:20.0 sec  Urinalysis Basic - ( 02 Sep 2020 12:12 )    Color: Light Yellow / Appearance: Clear / S.028 / pH: x  Gluc: x / Ketone: Negative  / Bili: Negative / Urobili: Negative   Blood: x / Protein: 30 mg/dL / Nitrite: Negative   Leuk Esterase: Negative / RBC: 2 /hpf / WBC 1 /HPF   Sq Epi: x / Non Sq Epi: 1 /hpf / Bacteria: Negative      CAPILLARY BLOOD GLUCOSE    RADIOLOGY & ADDITIONAL STUDIES:    EKG:   Personally Reviewed:  [x] YES - AF at 110 bpm. PVCs vs. intermittent abberent conduction; T wave inversion in III    Imaging:   Personally Reviewed:  [x] YES   CXR - displaced R 4th rib fracture  XR Pelvis/R femur - comminuted R acetabular fracture with extension to pubic rami and iliac bone with soft tissue swelling; osteopenia  CTA Chest - left atelectasis; trace bilateral pleural effusions; No PE; multiple R rib fractures (acute) as well as chronic fractures  CT Abd/Pelvis - Renal cysts; small amount of hemorrhage in space of Retzius; R pubic ramus fracture; R acetabular, pubic pone and femoral head fractures. Soft tissue hematomas of R obturator internus and iliacus muscles. Prescral hemorrhage.  CTH - no acute bleeding noted  CT C-Spine - no acute fracture              [x] Consultant(s) Notes Reviewed - orthopedics  [x] Care Discussed with Consultants/Other Providers: SICU team regarding outpatient medication regimen, past medical history    [x] Fall risks identified: history of such, dementia    [x] High risk medications identified: eliquis by history    [x] Probable osteoporosis    [ ] Possible osteomalacia    [x] Increased delirium risk    [x] Delirium and other risks can be reduced by:          -early ambulation          -minimizing "tethers" - IV, oxygen, catheters, etc          -avoiding hypnotics and sedatives          -maintaining hydration/nutrition          -avoid anticholinergics - diphenhydramine, etc          -pain control          -supportive environment

## 2020-09-03 NOTE — CONSULT NOTE ADULT - ASSESSMENT
89 year old man with a PMHz of moderate dementia (A+O x 1-2 at baseline), AF on Eliquis s/p PPM, CAD s/p CABG, aortic stenosis, polymyalgia rheumatica, HTN, and HLD who presented to the ED s/p fall on August 31st, found to have multiple injuries including R acetabular/femoral/pelvic fractures, multiple acute R sided rib fractures, and soft tissue and retroperitoneal bleeding.

## 2020-09-03 NOTE — PROGRESS NOTE ADULT - SUBJECTIVE AND OBJECTIVE BOX
Subjective:   Patient seen at bedside this AM. Reports feeling well, without complaints. Denies chest pain, SOB. Tolerating diet without N/V.     24h Events:   - Overnight, no acute events    Objective:  Vital Signs  T(C): 36.8 (09-03 @ 03:00), Max: 37.3 (09-02 @ 22:15)  HR: 90 (09-03 @ 09:00) (85 - 103)  BP: 127/68 (09-03 @ 09:00) (75/45 - 154/70)  RR: 18 (09-03 @ 09:00) (9 - 36)  SpO2: 95% (09-03 @ 09:00) (91% - 99%)  09-02-20 @ 07:01  -  09-03-20 @ 07:00  --------------------------------------------------------  IN: 1912.5 mL / OUT: 800 mL / NET: 1112.5 mL    09-03-20 @ 07:01  -  09-03-20 @ 12:29  --------------------------------------------------------  IN: 287.5 mL / OUT: 85 mL / NET: 202.5 mL      Physical Exam:  GEN: resting in bed comfortably in NAD  RESP: no visibly increased WOB  ABD: soft, non-distended, non-tender without rebound tenderness or guarding  EXTR: warm, well-perfused without gross deformities; spontaneous movement in b/l U/L extrem    Labs:             8.2    7.69  )-----------( 127      ( 03 Sep 2020 10:42 )             26.4   09-03    142  |  110<H>  |  32<H>  ----------------------------<  106<H>  4.8   |  24  |  0.93    Ca    9.5      03 Sep 2020 02:45  Phos  2.6     09-03  Mg     2.1     09-03    TPro  6.6  /  Alb  3.6  /  TBili  0.6  /  DBili  x   /  AST  43<H>  /  ALT  42  /  AlkPhos  83  09-02        Medications:   MEDICATIONS  (STANDING):  acetaminophen  IVPB .. 1000 milliGRAM(s) IV Intermittent every 6 hours  atorvastatin 80 milliGRAM(s) Oral at bedtime  cholecalciferol 1000 Unit(s) Oral daily  finasteride 5 milliGRAM(s) Oral daily  lidocaine   Patch 2 Patch Transdermal daily  memantine 10 milliGRAM(s) Oral two times a day  metoprolol succinate ER 25 milliGRAM(s) Oral daily  predniSONE   Tablet 1 milliGRAM(s) Oral <User Schedule>  predniSONE   Tablet 2 milliGRAM(s) Oral <User Schedule>  tamsulosin 0.4 milliGRAM(s) Oral at bedtime      Assessment:   Patient is a 89 year old male with a PMH of afib (on Eliquis), HTN, s/p CABG, PPM, and dementia found to have extensive fractures of R hemipelvis including nondisplaced fx of R femoral head, soft tissue hematomas extending into RP, multiple acute fx of R ribs, including displaced R 4th rib fx. Currently in SICU for hemorrhage watch.     Plan:   - Multimodal pain control: IV acetaminophen, lidocaine patch  - Incentive spirometer  - Diet: NPO + IVF   - Activity: OOB, IS   - hemorrhage watch protocol  - Strict I's/O's  - q1 vitals, hemorrhage watch protocol, cont. trend CBC   - No acute intervention needed per Ortho, OP f/u        Arlene Bravo, PGY-1  [facility]  [phone]

## 2020-09-03 NOTE — CONSULT NOTE ADULT - PROBLEM SELECTOR RECOMMENDATION 7
1. Would resume home dose of prednisone if no surgical contraindication: 1 mg PO qAM and 2 mg PO qPM. This should also help provide some anti-inflammatory effect on his pain, which is mild at this time.

## 2020-09-04 NOTE — PHYSICAL THERAPY INITIAL EVALUATION ADULT - ACTIVE RANGE OF MOTION EXAMINATION, REHAB EVAL
bilateral upper extremity Active ROM was WFL (within functional limits)/bilateral  lower extremity Active ROM was WFL (within functional limits)/except R hip limited due to pain

## 2020-09-04 NOTE — OCCUPATIONAL THERAPY INITIAL EVALUATION ADULT - ADL RETRAINING, OT EVAL
GOAL: Patient will require MIN A with UB dressing within 4 weeks GOAL: Pt will perform LB dressing with MIN A in 4 weeks GOAL: Patient will perform grooming standing sink level with MIN A in 4 weeks

## 2020-09-04 NOTE — PROGRESS NOTE ADULT - ASSESSMENT
89M PMH of afib (on Eliquis), HTN, s/p CABG, PPM, and dementia found to have extensive fractures of R hemipelvis including nondisplaced fx of R femoral head, soft tissue hematomas extending into RP, multiple acute fx of R ribs, including displaced R 4th rib fx. Clinically stable, off hemorrhage watch protocol.     Plan:   - Multimodal pain control: IV acetaminophen, lidocaine patch  - Diet: NPO + IVF   - Activity: OOB, IS    - Strict I's/O's  - DVT ppx: lovenox 40, b/l SCDs  - No acute intervention needed per Ortho, OP f/u    [facility]  #[id number] 89M PMH of afib (on Eliquis), HTN, s/p CABG, PPM, and dementia found to have extensive fractures of R hemipelvis including nondisplaced fx of R femoral head, soft tissue hematomas extending into RP, multiple acute fx of R ribs, including displaced R 4th rib fx. Clinically stable, off hemorrhage watch protocol.     Plan:   - Multimodal pain control: IV acetaminophen, lidocaine patch  - Diet: NPO + IVF   - Activity: OOB, IS    - Strict I's/O's  - DVT ppx: lovenox 40, b/l SCDs  - No acute intervention needed per Ortho, OP f/u  - D/c planning: team spoke with patient's wife yesterday, planning for [facility] and contacted patient's PMD     [facility]  #[id number] 89M PMH of afib (on Eliquis), HTN, s/p CABG, PPM, and dementia found to have fractures of R hemipelvis including nondisplaced fx of R femoral head, R acetabulum, soft tissue hematomas extending into RP, multiple acute fx of ribs, including R side 1-6 and L 10-11. Clinically stable, off hemorrhage watch protocol.     Plan:   - Multimodal pain control: IV acetaminophen, lidocaine patch  - Diet: NPO + IVF   - Activity: OOB, IS    - Strict I's/O's  - DVT ppx: lovenox 40, b/l SCDs  - No acute intervention needed per Ortho, OP f/u  - D/c planning: team spoke with patient's wife yesterday, planning for [facility] and contacted patient's PMD     [facility]  #[id number] 89M PMH of afib (on Eliquis), HTN, s/p CABG, PPM, and dementia found to have fractures of R hemipelvis including nondisplaced fx of R femoral head, R acetabulum, soft tissue hematomas extending into RP, multiple acute fx of ribs, including R side 1-6 and L 10-11. Clinically stable, off hemorrhage watch protocol.     Plan:   - Multimodal pain control: IV acetaminophen, lidocaine patch  - Diet: NPO + IVF   - Activity: OOB, IS    - Strict I's/O's  - DVT ppx: lovenox 40, b/l SCDs  - No acute intervention needed per Ortho, OP f/u  - D/c planning: team spoke with patient's wife yesterday, planning for [facility]I and contacted patient's PMD   - appreciate SICU care    [facility]  #[id number]

## 2020-09-04 NOTE — CHART NOTE - NSCHARTNOTEFT_GEN_A_CORE
Tertiary Trauma Survey (TTS)    Date of TTS: 09/04/2020                             Time: 10am  Admit Date: 09/02/2020                             Trauma Activation: N/A  Admit GCS: E- 3   V- 6   M- 4     HPI:  Patient is a 89 year old male with a PMH of afib (on Eliquis), HTN, s/p CABG, PPM, and dementia presenting to ED s/p unwitnessed fall two days ago. Per patient's wife, patient fell 2 days ago in the morning and hit his head with no LOC. Patient has become more agitated over the past few days, prompting trip to ED. Of note, patient has been at rehab s/p hospitalization for iliac fracture in November of 2019. Per wife, he stopped taking home eliquis since his fall 2 days ago. On interview, patient denying any pain, dizziness, SOB, palpitations, n/v/d. (02 Sep 2020 21:59)      PAST MEDICAL & SURGICAL HISTORY:  History of polymyalgia rheumatica: this is why patient is on chronic prednisone  HTN (hypertension)  BPH (benign prostatic hyperplasia)  Atrial fibrillation  H/O cardiac pacemaker  S/P CABG x 5    [X] No significant past history as reviewed with the patient and family    FAMILY HISTORY:  No pertinent family history in first degree relatives    [X] Family history not pertinent as reviewed with the patient and family    SOCIAL HISTORY:  Lives at home with wife, who used to be a physician.     Medications (inpatient): atorvastatin 80 milliGRAM(s) Oral at bedtime  chlorhexidine 2% Cloths 1 Application(s) Topical <User Schedule>  cholecalciferol 1000 Unit(s) Oral daily  enoxaparin Injectable 40 milliGRAM(s) SubCutaneous daily  finasteride 5 milliGRAM(s) Oral daily  lidocaine   Patch 2 Patch Transdermal daily  memantine 10 milliGRAM(s) Oral two times a day  metoprolol succinate ER 25 milliGRAM(s) Oral daily  predniSONE   Tablet 1 milliGRAM(s) Oral <User Schedule>  predniSONE   Tablet 2 milliGRAM(s) Oral <User Schedule>  tamsulosin 0.4 milliGRAM(s) Oral at bedtime    Medications (PRN):acetaminophen   Tablet .. 650 milliGRAM(s) Oral every 6 hours PRN  calcium carbonate    500 mG (Tums) Chewable 1 Tablet(s) Chew every 4 hours PRN    Allergies: No Known Allergies  (Intolerances: )    Vital Signs Last 24 Hrs  T(C): 37.1 (04 Sep 2020 11:00), Max: 37.1 (03 Sep 2020 19:00)  T(F): 98.8 (04 Sep 2020 11:00), Max: 98.8 (03 Sep 2020 19:00)  HR: 103 (04 Sep 2020 15:00) (88 - 116)  BP: 98/53 (04 Sep 2020 15:00) (95/54 - 178/96)  BP(mean): 68 (04 Sep 2020 15:00) (68 - 128)  RR: 22 (04 Sep 2020 15:00) (13 - 40)  SpO2: 98% (04 Sep 2020 15:00) (94% - 100%)  Drug Dosing Weight  Height (cm): 182.9 (02 Sep 2020 22:15)  Weight (kg): 71.3 (02 Sep 2020 22:15)  BMI (kg/m2): 21.3 (02 Sep 2020 22:15)  BSA (m2): 1.92 (02 Sep 2020 22:15)                          8.3    8.99  )-----------( 147      ( 04 Sep 2020 00:36 )             26.5     09-04    139  |  106  |  30<H>  ----------------------------<  103<H>  4.4   |  23  |  0.85    Ca    10.0      04 Sep 2020 00:36  Phos  2.5     09-04  Mg     2.2     09-04      PT/INR - ( 04 Sep 2020 00:36 )   PT: 13.0 sec;   INR: 1.10 ratio         PTT - ( 04 Sep 2020 00:36 )  PTT:21.7 sec      List Injuries Identified to Date:    List Operative and Interventional Radiological Procedures:   None     Consults (Date):  [  ] Neurosurgery   [X] Orthopedics - 09/03/2020  [  ] Plastics  [  ] Urology  [  ] PM&R  [  ] Social Work    RADIOLOGICAL FINDINGS REVIEW:  CXR  Xray Hip   Xray Femur  Xray Pelvis  CT Angio w/ IV Contrast  CT Head No Contrast  CT C Spine No Contrast   CT Abdomen and Pelvis w/ IV Cont       CXR:  < from: Xray Chest 1 View- PORTABLE-Routine (09.04.20 @ 06:49) >    IMPRESSION:    The mediastinal cardiac silhouette is unremarkable. Sternotomy. Pacemaker.    Limited by rotation to the left. No pneumothorax. Grossly clear lungs.    No acute osseous finding. Displaced right fourth rib fracture.    < end of copied text >      Pelvis Films:   < from: Xray Hip 2-3 Views, Right (09.02.20 @ 18:36) >    IMPRESSION:    There is a markedly comminuted right acetabular fracture with extension to the pubic rami as well as the iliac bone. The fracture morphology can be better assessed with the already perform CT scan.  There is significant associated soft tissue swelling.  There is diffuse osteopenia with degenerative changes of the spine.  Vascular calcifications are seen throughout the pelvis and lower extremities.  Contrast is seen within the bladder.    < end of copied text >    C-Spine Films:    T/L/S Spine Films:    Extremity Films:    Head CT:  < from: CT Head No Cont (09.02.20 @ 13:38) >  CT BRAIN:  No acute intracranial hemorrhage, brain edema, or mass effect. No displaced calvarial fracture.  No appreciable scalp hematoma.  < end of copied text >      C-Spine CT:  CT CERVICAL SPINE:  No acute fracture or traumatic subluxation.  No prevertebral soft tissue swelling.  Degenerative changes.  < end of copied text >    Neck CT:      Chest CT:  < from: CT Angio Chest w/ IV Cont (09.02.20 @ 13:02) >  FINDINGS:  CHEST:  LUNGS AND LARGE AIRWAYS: Patent central airways. Subsegmental linear atelectasis in the left lung base.  PLEURA: Trace bilateral pleural effusions.  VESSELS: Evaluation of subsegmental pulmonary arteries is suboptimal. No filling defect in the main and segmental pulmonary arteries.  HEART: Heart size is normal. No pericardial effusion.  MEDIASTINUM AND MARIA DEL ROSARIO: No lymphadenopathy.  CHEST WALL AND LOWER NECK: CABG. Multiple acute right rib fractures, including a displaced rib fracture of the right fourth rib. Multiple healing left rib fractures. Multiple additional chronic rib fractures.    < end of copied text >      ABD/Pelvis CT:    ABDOMEN AND PELVIS:  LIVER: Within normal limits.  BILE DUCTS: Normal caliber.  GALLBLADDER: Cholelithiasis.  SPLEEN: Within normal limits.  PANCREAS: Within normal limits.  ADRENALS: Within normal limits.  KIDNEYS/URETERS: Bilateral cysts. No hydronephrosis.    BLADDER: Decompressed with fatty stranding and small amount of hemorrhage in the space of Retzius.  REPRODUCTIVE ORGANS: Prostate is enlarged.    BOWEL: No bowel obstruction. Small hiatal hernia. Appendix is normal.  PERITONEUM: No ascites.  VESSELS: Marked vascular calcifications.  RETROPERITONEUM/LYMPH NODES: No lymphadenopathy.  ABDOMINAL WALL: Within normal limits.  BONES: Osteopenia. Fracture of right inferior pubic ramus, comminuted fractures of the right acetabulum, right pubic bone and nondisplaced fracture of right femoral head. Prior fracture deformity of the right iliac wing with suggestion of superimposed acute fractures. There are associated soft tissue hematomas with enlargement of the right obturator internus muscle and right iliacus muscles. Small amount of hemorrhage in the presacral space with extension into the retroperitoneum bilaterally.    IMPRESSION:  Suboptimal evaluation of the subsegmental arteries. Otherwise, no PE.  Extensive fractures of the right hemipelvis including a nondisplaced fracture of the right femoral head. Associated soft tissue hematomas involving the right obturator internus and iliacus muscles with extension in the retroperitoneum. Multiple acute fractures of the right ribs, including a displaced right fourth rib fracture.  Question of cystitis.    < end of copied text >      Other:    Interpretation of Findings:    ASSESSMENT:  89M PMH of afib (on Eliquis), HTN, s/p CABG, PPM, and dementia found to have fractures of R hemipelvis including nondisplaced fx of R femoral head, R acetabulum, soft tissue hematomas extending into RP, multiple acute fx of ribs, including R side 1-6 and L 10-11. Clinically stable, off hemorrhage watch protocol.     Plan:   - Multimodal pain control: IV acetaminophen, lidocaine patch  - Diet: NPO + IVF   - Activity: OOB, IS    - Strict I's/O's  - DVT ppx: lovenox 40, b/l SCDs  - No acute intervention needed per Ortho, OP f/u  - PT: bed mobility, balance, transfer, gait training; recommending RONI   - D/c planning: team spoke with patient's wife yesterday, planning for RONI and contacted patient's PMD      [facility]  #[id number] Tertiary Trauma Survey (TTS)    Date of TTS: 09/04/2020                             Time: 10am  Admit Date: 09/02/2020                              Trauma Activation: N/A  Admit GCS: E- 3   V- 6   M- 4     HPI:  Patient is a 89 year old male with a PMH of afib (on Eliquis), HTN, s/p CABG, PPM, and dementia presenting to ED s/p unwitnessed fall two days ago. Per patient's wife, patient fell 2 days ago in the morning and hit his head with no LOC. Patient has become more agitated over the past few days, prompting trip to ED. Of note, patient has been at rehab s/p hospitalization for iliac fracture in November of 2019. Per wife, he stopped taking home eliquis since his fall 2 days ago. On interview, patient denying any pain, dizziness, SOB, palpitations, n/v/d. (02 Sep 2020 21:59)      PAST MEDICAL & SURGICAL HISTORY:  History of polymyalgia rheumatica: this is why patient is on chronic prednisone  HTN (hypertension)  BPH (benign prostatic hyperplasia)  Atrial fibrillation  H/O cardiac pacemaker  S/P CABG x 5    [X] No significant past history as reviewed with the patient and family    FAMILY HISTORY:  No pertinent family history in first degree relatives    [X] Family history not pertinent as reviewed with the patient and family    SOCIAL HISTORY:  Lives at home with wife, who used to be a physician.     Medications (inpatient): atorvastatin 80 milliGRAM(s) Oral at bedtime  chlorhexidine 2% Cloths 1 Application(s) Topical <User Schedule>  cholecalciferol 1000 Unit(s) Oral daily  enoxaparin Injectable 40 milliGRAM(s) SubCutaneous daily  finasteride 5 milliGRAM(s) Oral daily  lidocaine   Patch 2 Patch Transdermal daily  memantine 10 milliGRAM(s) Oral two times a day  metoprolol succinate ER 25 milliGRAM(s) Oral daily  predniSONE   Tablet 1 milliGRAM(s) Oral <User Schedule>  predniSONE   Tablet 2 milliGRAM(s) Oral <User Schedule>  tamsulosin 0.4 milliGRAM(s) Oral at bedtime    Medications (PRN):acetaminophen   Tablet .. 650 milliGRAM(s) Oral every 6 hours PRN  calcium carbonate    500 mG (Tums) Chewable 1 Tablet(s) Chew every 4 hours PRN    Allergies: No Known Allergies  (Intolerances: )    Vital Signs Last 24 Hrs  T(C): 37.1 (04 Sep 2020 11:00), Max: 37.1 (03 Sep 2020 19:00)  T(F): 98.8 (04 Sep 2020 11:00), Max: 98.8 (03 Sep 2020 19:00)  HR: 103 (04 Sep 2020 15:00) (88 - 116)  BP: 98/53 (04 Sep 2020 15:00) (95/54 - 178/96)  BP(mean): 68 (04 Sep 2020 15:00) (68 - 128)  RR: 22 (04 Sep 2020 15:00) (13 - 40)  SpO2: 98% (04 Sep 2020 15:00) (94% - 100%)  Drug Dosing Weight  Height (cm): 182.9 (02 Sep 2020 22:15)  Weight (kg): 71.3 (02 Sep 2020 22:15)  BMI (kg/m2): 21.3 (02 Sep 2020 22:15)  BSA (m2): 1.92 (02 Sep 2020 22:15)    PHYSICAL EXAM:    General: NAD, Sitting in bed comfortably; difficult to evaluate PE due to mental status; patient with slurred speech, repeating "no pain" with palpation, then stating "no more today"; not following commands   HEENT: NC/AT, EOM grossly intact, tracking examiner, but not cooperating with neuro exam   Neck: Soft, supple, no lymphadenopathy   Cardio: RRR, S1/S2 appreciated   Resp: Good effort, CTA b/l, no increased work of breathing   Thorax: No chest wall tenderness  GI/Abd: Soft, nontender, nondistended; no rebound/guarding, no hepatosplenomegaly appreciated   Vascular: Extremities warm, cap refill < 2 sec, B/l radial pulses palpable, b/l DP/PT palpable, no palpable abdominal pulsatile mass  Skin: Intact, ecchymoses appreciated on R arm   Lymphatic/Nodes: No palpable lymphadenopathy  Musculoskeletal: All 4 extremities moving spontaneously, upper extremities placed in soft restraints                             8.3    8.99  )-----------( 147      ( 04 Sep 2020 00:36 )             26.5     09-04    139  |  106  |  30<H>  ----------------------------<  103<H>  4.4   |  23  |  0.85    Ca    10.0      04 Sep 2020 00:36  Phos  2.5     09-04  Mg     2.2     09-04      PT/INR - ( 04 Sep 2020 00:36 )   PT: 13.0 sec;   INR: 1.10 ratio         PTT - ( 04 Sep 2020 00:36 )  PTT:21.7 sec      List Injuries Identified to Date:  R RIBS  - Multiple acute right rib fractures. Multiple healing left rib fractures. Multiple additional chronic rib fractures.  - Displaced right fourth rib fracture.    R HEMIPELVIS   - Markedly comminuted right acetabular fracture with extension to the pubic rami as well as the iliac bone.  - Nondisplaced fracture of the right femoral head.  - Significant associated soft tissue hematomas involving the right obturator internus and iliacus muscles with extension in the retroperitoneum.   - Small amount of hemorrhage in the presacral space with extension into the retroperitoneum bilaterally.        List Operative and Interventional Radiological Procedures:   None     Consults (Date):  [  ] Neurosurgery   [X] Orthopedics - 09/03/2020  [  ] Plastics  [  ] Urology  [  ] PM&R  [  ] Social Work    RADIOLOGICAL FINDINGS REVIEW:  CXR  Xray Hip   Xray Femur  Xray Pelvis  CT Angio w/ IV Contrast  CT Head No Contrast  CT C Spine No Contrast   CT Abdomen and Pelvis w/ IV Cont       CXR:  < from: Xray Chest 1 View- PORTABLE-Routine (09.04.20 @ 06:49) >    IMPRESSION:    The mediastinal cardiac silhouette is unremarkable. Sternotomy. Pacemaker.    Limited by rotation to the left. No pneumothorax. Grossly clear lungs.    No acute osseous finding. Displaced right fourth rib fracture.    < end of copied text >      Pelvis Films:   < from: Xray Hip 2-3 Views, Right (09.02.20 @ 18:36) >    IMPRESSION:    There is a markedly comminuted right acetabular fracture with extension to the pubic rami as well as the iliac bone. The fracture morphology can be better assessed with the already perform CT scan.  There is significant associated soft tissue swelling.  There is diffuse osteopenia with degenerative changes of the spine.  Vascular calcifications are seen throughout the pelvis and lower extremities.  Contrast is seen within the bladder.    < end of copied text >    C-Spine Films:    T/L/S Spine Films:    Extremity Films:    Head CT:  < from: CT Head No Cont (09.02.20 @ 13:38) >  CT BRAIN:  No acute intracranial hemorrhage, brain edema, or mass effect. No displaced calvarial fracture.  No appreciable scalp hematoma.  < end of copied text >      C-Spine CT:  CT CERVICAL SPINE:  No acute fracture or traumatic subluxation.  No prevertebral soft tissue swelling.  Degenerative changes.  < end of copied text >    Neck CT:      Chest CT:  < from: CT Angio Chest w/ IV Cont (09.02.20 @ 13:02) >  FINDINGS:  CHEST:  LUNGS AND LARGE AIRWAYS: Patent central airways. Subsegmental linear atelectasis in the left lung base.  PLEURA: Trace bilateral pleural effusions.  VESSELS: Evaluation of subsegmental pulmonary arteries is suboptimal. No filling defect in the main and segmental pulmonary arteries.  HEART: Heart size is normal. No pericardial effusion.  MEDIASTINUM AND MARIA DEL ROSARIO: No lymphadenopathy.  CHEST WALL AND LOWER NECK: CABG. Multiple acute right rib fractures, including a displaced rib fracture of the right fourth rib. Multiple healing left rib fractures. Multiple additional chronic rib fractures.    < end of copied text >      ABD/Pelvis CT:    ABDOMEN AND PELVIS:  LIVER: Within normal limits.  BILE DUCTS: Normal caliber.  GALLBLADDER: Cholelithiasis.  SPLEEN: Within normal limits.  PANCREAS: Within normal limits.  ADRENALS: Within normal limits.  KIDNEYS/URETERS: Bilateral cysts. No hydronephrosis.    BLADDER: Decompressed with fatty stranding and small amount of hemorrhage in the space of Retzius.  REPRODUCTIVE ORGANS: Prostate is enlarged.    BOWEL: No bowel obstruction. Small hiatal hernia. Appendix is normal.  PERITONEUM: No ascites.  VESSELS: Marked vascular calcifications.  RETROPERITONEUM/LYMPH NODES: No lymphadenopathy.  ABDOMINAL WALL: Within normal limits.  BONES: Osteopenia. Fracture of right inferior pubic ramus, comminuted fractures of the right acetabulum, right pubic bone and nondisplaced fracture of right femoral head. Prior fracture deformity of the right iliac wing with suggestion of superimposed acute fractures. There are associated soft tissue hematomas with enlargement of the right obturator internus muscle and right iliacus muscles. Small amount of hemorrhage in the presacral space with extension into the retroperitoneum bilaterally.    IMPRESSION:  Suboptimal evaluation of the subsegmental arteries. Otherwise, no PE.  Extensive fractures of the right hemipelvis including a nondisplaced fracture of the right femoral head. Associated soft tissue hematomas involving the right obturator internus and iliacus muscles with extension in the retroperitoneum. Multiple acute fractures of the right ribs, including a displaced right fourth rib fracture.  Question of cystitis.    < end of copied text >      Other:    INTERPRETATION:  89M PMH of afib (on Eliquis), HTN, s/p CABG, PPM, and dementia found to have fractures of R hemipelvis including nondisplaced fx of R femoral head, R acetabulum, soft tissue hematomas extending into RP, multiple acute fx of ribs, including R side 1-6 and L 10-11. Clinically stable, off hemorrhage watch protocol.     Plan:   - Multimodal pain control: IV acetaminophen, lidocaine patch  - Diet: NPO + IVF   - Activity: OOB, IS    - Strict I's/O's  - DVT ppx: lovenox 40, b/l SCDs  - No acute intervention needed per Ortho, OP f/u  - PT: bed mobility, balance, transfer, gait training; recommending RONI   - D/c planning: team spoke with patient's wife yesterday, planning for RONI and contacted patient's PMD      [facility]  #[id number] Tertiary Trauma Survey (TTS)    Date of TTS: 09/04/2020                             Time: 10am  Admit Date: 09/02/2020                              Trauma Activation: N/A  Admit GCS: E- 3   V- 6   M- 4     HPI:  Patient is a 89 year old male with a PMH of afib (on Eliquis), HTN, s/p CABG, PPM, and dementia presenting to ED s/p unwitnessed fall two days ago. Per patient's wife, patient fell 2 days ago in the morning and hit his head with no LOC. Patient has become more agitated over the past few days, prompting trip to ED. Of note, patient has been at rehab s/p hospitalization for iliac fracture in November of 2019. Per wife, he stopped taking home eliquis since his fall 2 days ago. On interview, patient denying any pain, dizziness, SOB, palpitations, n/v/d. (02 Sep 2020 21:59)      PAST MEDICAL & SURGICAL HISTORY:  History of polymyalgia rheumatica: this is why patient is on chronic prednisone  HTN (hypertension)  BPH (benign prostatic hyperplasia)  Atrial fibrillation  H/O cardiac pacemaker  S/P CABG x 5    [X] No significant past history as reviewed with the patient and family    FAMILY HISTORY:  No pertinent family history in first degree relatives    [X] Family history not pertinent as reviewed with the patient and family    SOCIAL HISTORY:  Lives at home with wife, who used to be a physician.     Medications (inpatient): atorvastatin 80 milliGRAM(s) Oral at bedtime  chlorhexidine 2% Cloths 1 Application(s) Topical <User Schedule>  cholecalciferol 1000 Unit(s) Oral daily  enoxaparin Injectable 40 milliGRAM(s) SubCutaneous daily  finasteride 5 milliGRAM(s) Oral daily  lidocaine   Patch 2 Patch Transdermal daily  memantine 10 milliGRAM(s) Oral two times a day  metoprolol succinate ER 25 milliGRAM(s) Oral daily  predniSONE   Tablet 1 milliGRAM(s) Oral <User Schedule>  predniSONE   Tablet 2 milliGRAM(s) Oral <User Schedule>  tamsulosin 0.4 milliGRAM(s) Oral at bedtime    Medications (PRN):acetaminophen   Tablet .. 650 milliGRAM(s) Oral every 6 hours PRN  calcium carbonate    500 mG (Tums) Chewable 1 Tablet(s) Chew every 4 hours PRN    Allergies: No Known Allergies  (Intolerances: )    Vital Signs Last 24 Hrs  T(C): 37.1 (04 Sep 2020 11:00), Max: 37.1 (03 Sep 2020 19:00)  T(F): 98.8 (04 Sep 2020 11:00), Max: 98.8 (03 Sep 2020 19:00)  HR: 103 (04 Sep 2020 15:00) (88 - 116)  BP: 98/53 (04 Sep 2020 15:00) (95/54 - 178/96)  BP(mean): 68 (04 Sep 2020 15:00) (68 - 128)  RR: 22 (04 Sep 2020 15:00) (13 - 40)  SpO2: 98% (04 Sep 2020 15:00) (94% - 100%)  Drug Dosing Weight  Height (cm): 182.9 (02 Sep 2020 22:15)  Weight (kg): 71.3 (02 Sep 2020 22:15)  BMI (kg/m2): 21.3 (02 Sep 2020 22:15)  BSA (m2): 1.92 (02 Sep 2020 22:15)    PHYSICAL EXAM:   Gen: resting comfortably in bed, in NAD  Head: normocephalic, non-tender to palpation  Neck: no JVD, non-tender to palpation   Chest: non-tender to palpation across clavicles and L anterior ribs; diffusely tender to palpation R anterior ribs  Back: non-tender to palpation along midline and b/l posterior ribs; slight tenderness to palpation R scapula   Abd: soft, non-distended, non-tender; reducible umbilical hernia   Extrem: b/l UE non-tender to palpation              RLE slight tenderness to palpation along R hip and along side of femur               LLE non-tender to palpation               Moving all extremities spontaneously; warm, well-perfused, palpable distal pulses   Neuro: AAOx3, no focal neuro deficits                            8.3    8.99  )-----------( 147      ( 04 Sep 2020 00:36 )             26.5     09-04    139  |  106  |  30<H>  ----------------------------<  103<H>  4.4   |  23  |  0.85    Ca    10.0      04 Sep 2020 00:36  Phos  2.5     09-04  Mg     2.2     09-04      PT/INR - ( 04 Sep 2020 00:36 )   PT: 13.0 sec;   INR: 1.10 ratio         PTT - ( 04 Sep 2020 00:36 )  PTT:21.7 sec      List Injuries Identified to Date:  R RIBS  - Multiple acute right rib fractures. Multiple healing left rib fractures. Multiple additional chronic rib fractures.  - Displaced right fourth rib fracture.    R HEMIPELVIS   - Markedly comminuted right acetabular fracture with extension to the pubic rami as well as the iliac bone.  - Nondisplaced fracture of the right femoral head.  - Significant associated soft tissue hematomas involving the right obturator internus and iliacus muscles with extension in the retroperitoneum.   - Small amount of hemorrhage in the presacral space with extension into the retroperitoneum bilaterally.        List Operative and Interventional Radiological Procedures:   None     Consults (Date):  [  ] Neurosurgery   [X] Orthopedics - 09/03/2020  [  ] Plastics  [  ] Urology  [  ] PM&R  [  ] Social Work    RADIOLOGICAL FINDINGS REVIEW:  CXR  Xray Hip   Xray Femur  Xray Pelvis  CT Angio w/ IV Contrast  CT Head No Contrast  CT C Spine No Contrast   CT Abdomen and Pelvis w/ IV Cont       CXR:  < from: Xray Chest 1 View- PORTABLE-Routine (09.04.20 @ 06:49) >    IMPRESSION:    The mediastinal cardiac silhouette is unremarkable. Sternotomy. Pacemaker.    Limited by rotation to the left. No pneumothorax. Grossly clear lungs.    No acute osseous finding. Displaced right fourth rib fracture.    < end of copied text >      Pelvis Films:   < from: Xray Hip 2-3 Views, Right (09.02.20 @ 18:36) >    IMPRESSION:    There is a markedly comminuted right acetabular fracture with extension to the pubic rami as well as the iliac bone. The fracture morphology can be better assessed with the already perform CT scan.  There is significant associated soft tissue swelling.  There is diffuse osteopenia with degenerative changes of the spine.  Vascular calcifications are seen throughout the pelvis and lower extremities.  Contrast is seen within the bladder.    < end of copied text >    C-Spine Films:    T/L/S Spine Films:    Extremity Films:    Head CT:  < from: CT Head No Cont (09.02.20 @ 13:38) >  CT BRAIN:  No acute intracranial hemorrhage, brain edema, or mass effect. No displaced calvarial fracture.  No appreciable scalp hematoma.  < end of copied text >      C-Spine CT:  CT CERVICAL SPINE:  No acute fracture or traumatic subluxation.  No prevertebral soft tissue swelling.  Degenerative changes.  < end of copied text >    Neck CT:      Chest CT:  < from: CT Angio Chest w/ IV Cont (09.02.20 @ 13:02) >  FINDINGS:  CHEST:  LUNGS AND LARGE AIRWAYS: Patent central airways. Subsegmental linear atelectasis in the left lung base.  PLEURA: Trace bilateral pleural effusions.  VESSELS: Evaluation of subsegmental pulmonary arteries is suboptimal. No filling defect in the main and segmental pulmonary arteries.  HEART: Heart size is normal. No pericardial effusion.  MEDIASTINUM AND MARIA DEL ROSARIO: No lymphadenopathy.  CHEST WALL AND LOWER NECK: CABG. Multiple acute right rib fractures, including a displaced rib fracture of the right fourth rib. Multiple healing left rib fractures. Multiple additional chronic rib fractures.    < end of copied text >      ABD/Pelvis CT:    ABDOMEN AND PELVIS:  LIVER: Within normal limits.  BILE DUCTS: Normal caliber.  GALLBLADDER: Cholelithiasis.  SPLEEN: Within normal limits.  PANCREAS: Within normal limits.  ADRENALS: Within normal limits.  KIDNEYS/URETERS: Bilateral cysts. No hydronephrosis.    BLADDER: Decompressed with fatty stranding and small amount of hemorrhage in the space of Retzius.  REPRODUCTIVE ORGANS: Prostate is enlarged.    BOWEL: No bowel obstruction. Small hiatal hernia. Appendix is normal.  PERITONEUM: No ascites.  VESSELS: Marked vascular calcifications.  RETROPERITONEUM/LYMPH NODES: No lymphadenopathy.  ABDOMINAL WALL: Within normal limits.  BONES: Osteopenia. Fracture of right inferior pubic ramus, comminuted fractures of the right acetabulum, right pubic bone and nondisplaced fracture of right femoral head. Prior fracture deformity of the right iliac wing with suggestion of superimposed acute fractures. There are associated soft tissue hematomas with enlargement of the right obturator internus muscle and right iliacus muscles. Small amount of hemorrhage in the presacral space with extension into the retroperitoneum bilaterally.    IMPRESSION:  Suboptimal evaluation of the subsegmental arteries. Otherwise, no PE.  Extensive fractures of the right hemipelvis including a nondisplaced fracture of the right femoral head. Associated soft tissue hematomas involving the right obturator internus and iliacus muscles with extension in the retroperitoneum. Multiple acute fractures of the right ribs, including a displaced right fourth rib fracture.  Question of cystitis.    < end of copied text >      Other:    INTERPRETATION:  89M PMH of afib (on Eliquis), HTN, s/p CABG, PPM, and dementia found to have fractures of R hemipelvis including nondisplaced fx of R femoral head, R acetabulum, soft tissue hematomas extending into RP, multiple acute fx of ribs, including R side 1-6 and L 10-11. Clinically stable, off hemorrhage watch protocol.     Plan:   - Multimodal pain control: IV acetaminophen, lidocaine patch  - Diet: NPO + IVF   - Activity: OOB, IS    - Strict I's/O's  - DVT ppx: lovenox 40, b/l SCDs  - No acute intervention needed per Ortho, OP f/u  - PT: bed mobility, balance, transfer, gait training; recommending RONI   - D/c planning: team spoke with patient's wife yesterday, planning for RONI and contacted patient's PMD      [facility]  #[id number] Tertiary Trauma Survey (TTS)    Date of TTS: 09/04/2020                             Time: 10am  Admit Date: 09/02/2020                              Trauma Activation: N/A  Admit GCS: E- 3   V- 6   M- 4     HPI:  Patient is a 89 year old male with a PMH of afib (on Eliquis), HTN, s/p CABG, PPM, and dementia presenting to ED s/p unwitnessed fall two days ago. Per patient's wife, patient fell 2 days ago in the morning and hit his head with no LOC. Patient has become more agitated over the past few days, prompting trip to ED. Of note, patient has been at rehab s/p hospitalization for iliac fracture in November of 2019. Per wife, he stopped taking home eliquis since his fall 2 days ago. On interview, patient denying any pain, dizziness, SOB, palpitations, n/v/d. (02 Sep 2020 21:59)      PAST MEDICAL & SURGICAL HISTORY:  History of polymyalgia rheumatica: this is why patient is on chronic prednisone  HTN (hypertension)  BPH (benign prostatic hyperplasia)  Atrial fibrillation  H/O cardiac pacemaker  S/P CABG x 5    [X] No significant past history as reviewed with the patient and family    FAMILY HISTORY:  No pertinent family history in first degree relatives    [X] Family history not pertinent as reviewed with the patient and family    SOCIAL HISTORY:  Lives at home with wife, who used to be a physician.     Medications (inpatient): atorvastatin 80 milliGRAM(s) Oral at bedtime  chlorhexidine 2% Cloths 1 Application(s) Topical <User Schedule>  cholecalciferol 1000 Unit(s) Oral daily  enoxaparin Injectable 40 milliGRAM(s) SubCutaneous daily  finasteride 5 milliGRAM(s) Oral daily  lidocaine   Patch 2 Patch Transdermal daily  memantine 10 milliGRAM(s) Oral two times a day  metoprolol succinate ER 25 milliGRAM(s) Oral daily  predniSONE   Tablet 1 milliGRAM(s) Oral <User Schedule>  predniSONE   Tablet 2 milliGRAM(s) Oral <User Schedule>  tamsulosin 0.4 milliGRAM(s) Oral at bedtime    Medications (PRN):acetaminophen   Tablet .. 650 milliGRAM(s) Oral every 6 hours PRN  calcium carbonate    500 mG (Tums) Chewable 1 Tablet(s) Chew every 4 hours PRN    Allergies: No Known Allergies  (Intolerances: )    Vital Signs Last 24 Hrs  T(C): 37.1 (04 Sep 2020 11:00), Max: 37.1 (03 Sep 2020 19:00)  T(F): 98.8 (04 Sep 2020 11:00), Max: 98.8 (03 Sep 2020 19:00)  HR: 103 (04 Sep 2020 15:00) (88 - 116)  BP: 98/53 (04 Sep 2020 15:00) (95/54 - 178/96)  BP(mean): 68 (04 Sep 2020 15:00) (68 - 128)  RR: 22 (04 Sep 2020 15:00) (13 - 40)  SpO2: 98% (04 Sep 2020 15:00) (94% - 100%)  Drug Dosing Weight  Height (cm): 182.9 (02 Sep 2020 22:15)  Weight (kg): 71.3 (02 Sep 2020 22:15)  BMI (kg/m2): 21.3 (02 Sep 2020 22:15)  BSA (m2): 1.92 (02 Sep 2020 22:15)    PHYSICAL EXAM:   Gen: resting comfortably in bed, in NAD  Head: normocephalic, non-tender to palpation  Neck: no JVD, non-tender to palpation   Chest: non-tender to palpation across clavicles and L anterior ribs; diffusely tender to palpation R anterior ribs  Back: non-tender to palpation along midline and b/l posterior ribs; slight tenderness to palpation R scapula   Abd: soft, non-distended, non-tender; reducible umbilical hernia   Extrem: b/l UE non-tender to palpation              RLE slight tenderness to palpation along R hip and along side of femur               LLE non-tender to palpation               Moving all extremities spontaneously; warm, well-perfused, palpable distal pulses   Neuro: AAOx3, no focal neuro deficits                            8.3    8.99  )-----------( 147      ( 04 Sep 2020 00:36 )             26.5     09-04    139  |  106  |  30<H>  ----------------------------<  103<H>  4.4   |  23  |  0.85    Ca    10.0      04 Sep 2020 00:36  Phos  2.5     09-04  Mg     2.2     09-04      PT/INR - ( 04 Sep 2020 00:36 )   PT: 13.0 sec;   INR: 1.10 ratio         PTT - ( 04 Sep 2020 00:36 )  PTT:21.7 sec      List Injuries Identified to Date:  R RIBS  - Multiple acute right rib fractures. Multiple healing left rib fractures. Multiple additional chronic rib fractures.  - Displaced right fourth rib fracture.    R HEMIPELVIS   - Markedly comminuted right acetabular fracture with extension to the pubic rami as well as the iliac bone.  - Nondisplaced fracture of the right femoral head.  - Significant associated soft tissue hematomas involving the right obturator internus and iliacus muscles with extension in the retroperitoneum.   - Small amount of hemorrhage in the presacral space with extension into the retroperitoneum bilaterally.        List Operative and Interventional Radiological Procedures:   None     Consults (Date):  [  ] Neurosurgery   [X] Orthopedics - 09/03/2020  [  ] Plastics  [  ] Urology  [  ] PM&R  [  ] Social Work    RADIOLOGICAL FINDINGS REVIEW:  CXR  Xray Hip   Xray Femur  Xray Pelvis  CT Angio w/ IV Contrast  CT Head No Contrast  CT C Spine No Contrast   CT Abdomen and Pelvis w/ IV Cont       CXR:  < from: Xray Chest 1 View- PORTABLE-Routine (09.04.20 @ 06:49) >    IMPRESSION:    The mediastinal cardiac silhouette is unremarkable. Sternotomy. Pacemaker.    Limited by rotation to the left. No pneumothorax. Grossly clear lungs.    No acute osseous finding. Displaced right fourth rib fracture.    < end of copied text >      Pelvis Films:   < from: Xray Hip 2-3 Views, Right (09.02.20 @ 18:36) >    IMPRESSION:    There is a markedly comminuted right acetabular fracture with extension to the pubic rami as well as the iliac bone. The fracture morphology can be better assessed with the already perform CT scan.  There is significant associated soft tissue swelling.  There is diffuse osteopenia with degenerative changes of the spine.  Vascular calcifications are seen throughout the pelvis and lower extremities.  Contrast is seen within the bladder.    < end of copied text >    C-Spine Films:    T/L/S Spine Films:    Extremity Films:    Head CT:  < from: CT Head No Cont (09.02.20 @ 13:38) >  CT BRAIN:  No acute intracranial hemorrhage, brain edema, or mass effect. No displaced calvarial fracture.  No appreciable scalp hematoma.  < end of copied text >      C-Spine CT:  CT CERVICAL SPINE:  No acute fracture or traumatic subluxation.  No prevertebral soft tissue swelling.  Degenerative changes.  < end of copied text >    Neck CT:      Chest CT:  < from: CT Angio Chest w/ IV Cont (09.02.20 @ 13:02) >  FINDINGS:  CHEST:  LUNGS AND LARGE AIRWAYS: Patent central airways. Subsegmental linear atelectasis in the left lung base.  PLEURA: Trace bilateral pleural effusions.  VESSELS: Evaluation of subsegmental pulmonary arteries is suboptimal. No filling defect in the main and segmental pulmonary arteries.  HEART: Heart size is normal. No pericardial effusion.  MEDIASTINUM AND MARIA DEL ROSARIO: No lymphadenopathy.  CHEST WALL AND LOWER NECK: CABG. Multiple acute right rib fractures, including a displaced rib fracture of the right fourth rib. Multiple healing left rib fractures. Multiple additional chronic rib fractures.    < end of copied text >      ABD/Pelvis CT:    ABDOMEN AND PELVIS:  LIVER: Within normal limits.  BILE DUCTS: Normal caliber.  GALLBLADDER: Cholelithiasis.  SPLEEN: Within normal limits.  PANCREAS: Within normal limits.  ADRENALS: Within normal limits.  KIDNEYS/URETERS: Bilateral cysts. No hydronephrosis.    BLADDER: Decompressed with fatty stranding and small amount of hemorrhage in the space of Retzius.  REPRODUCTIVE ORGANS: Prostate is enlarged.    BOWEL: No bowel obstruction. Small hiatal hernia. Appendix is normal.  PERITONEUM: No ascites.  VESSELS: Marked vascular calcifications.  RETROPERITONEUM/LYMPH NODES: No lymphadenopathy.  ABDOMINAL WALL: Within normal limits.  BONES: Osteopenia. Fracture of right inferior pubic ramus, comminuted fractures of the right acetabulum, right pubic bone and nondisplaced fracture of right femoral head. Prior fracture deformity of the right iliac wing with suggestion of superimposed acute fractures. There are associated soft tissue hematomas with enlargement of the right obturator internus muscle and right iliacus muscles. Small amount of hemorrhage in the presacral space with extension into the retroperitoneum bilaterally.    IMPRESSION:  Suboptimal evaluation of the subsegmental arteries. Otherwise, no PE.  Extensive fractures of the right hemipelvis including a nondisplaced fracture of the right femoral head. Associated soft tissue hematomas involving the right obturator internus and iliacus muscles with extension in the retroperitoneum. Multiple acute fractures of the right ribs, including a displaced right fourth rib fracture.  Question of cystitis.    < end of copied text >      Other:    INTERPRETATION:  89M PMH of afib (on Eliquis), HTN, s/p CABG, PPM, and dementia found to have fractures of R hemipelvis including nondisplaced fx of R femoral head, R acetabulum, soft tissue hematomas extending into RP, multiple acute fx of ribs, including R side 1-6 and L 10-11. Clinically stable, off hemorrhage watch protocol.     Plan:   - Multimodal pain control: IV acetaminophen, lidocaine patch  - Diet: NPO + IVF   - Activity: NWB RLE, OOB, IS    - Strict I's/O's  - DVT ppx: lovenox 40, b/l SCDs  - No acute intervention needed per Ortho, OP f/u  - PT: bed mobility, balance, transfer, gait training; recommending RONI   - D/c planning: team spoke with patient's wife yesterday, planning for RONI and contacted patient's PMD      [facility]  #[id number]

## 2020-09-04 NOTE — PHYSICAL THERAPY INITIAL EVALUATION ADULT - TRANSFER TRAINING, PT EVAL
GOAL: Pt will perform ALL transfers with MIN assist, w/use of appropriate assistive device as needed, in 4 weeks.

## 2020-09-04 NOTE — PROGRESS NOTE ADULT - PROBLEM SELECTOR PLAN 5
1. Not on ASA at home and would not start now.  2. Continue lipitor.  3. Beta blocker as above.  4. EKG reviewed and not consistent with an acute coronary syndrome.  5. Has significant murmur on exam, small effusions on imaging, and known history of AS. Favor PO over IV hydration.

## 2020-09-04 NOTE — PROGRESS NOTE ADULT - PROBLEM SELECTOR PLAN 3
Agitation likely related to underlying dementia and exacerbated by acute medical condition and pain. Suspect transient delirium that is now resolved.  1. UA not suggestive of UTI. No significant metabolic derangement noted on testing.  2. Continue home dose of memantine 10 mg PO BID.  3. Pain control as above.  4. Fall/aspiration precautions.  5. Limit sedatives as able.  6. Frequent reorientation.  7. Maintain sleep-wake cycle. Consider melatonin qhs.

## 2020-09-04 NOTE — PHYSICAL THERAPY INITIAL EVALUATION ADULT - PERTINENT HX OF CURRENT PROBLEM, REHAB EVAL
89 year old male with a PMH of afib (on Eliquis), HTN, s/p CABG, PPM, and dementia, present s/p fall 2 days prior. CT finding with extensive fractures of R hemipelvis including nondisplaced fx of R femoral head, soft tissue hematomas extending into RP, multiple acute fx of R ribs, including displaced R 4th rib fx. SICU for hemorrhage watch.

## 2020-09-04 NOTE — PROGRESS NOTE ADULT - ASSESSMENT
Patient is a 89 year old male with a PMH of afib (on Eliquis), HTN, s/p CABG, PPM, and dementia presenting to ED s/p unwitnessed fall two days ago. Per patient's wife, patient fell 2 days ago in the morning and hit his head with no LOC. Patient has become more agitated over the past few days, prompting trip to ED. Of note, patient has been at rehab s/p hospitalization for iliac fracture in November of 2019. Per wife, he stopped taking home eliquis.     PLAN:   Neuro  -A&Ox2 at baseline  -Pain: Tylenol and lidoderm    Resp  -Satting well on supplemental oxygen    CV  -Restarted home medications: Atorvastatin, Metoprolol 25mg,       GI  -Soft diet    /Renal  -IVF: LR @75cc/hr  -Delgado in    Heme  -Hematoma in presacral space. CBC stable.   -DVT ppx with lovenox    ID: CLAY    Endo: CLAY    MSK  -R 1-6 rib fx, L 10, 11 fx   -R inferior pubic ramus fx, R comminuted fermoral head fx, acetabulum fx, prior fx of R iliac wing with possible acute fx. R RP hematoma  -Per ortho, weight bearing as tolerated    Dispo:   SICU  RONI

## 2020-09-04 NOTE — DIETITIAN INITIAL EVALUATION ADULT. - OTHER INFO
Pt seen out of bed to chair.   Pt noted with dementia, able to participate in RD interview but provides limited detail.     Pt reports a good PO intake PTA. Cannot recall his usual intake PTA but states he enjoys cereal and fish.   Per RN, Pt ate 75% of cereal for breakfast this morning.     Pt unable to recall his usual boy weight.   Pt visually appears wasted ? if due to advanced age. See Nutrition focused physical exam findings below.    Pt denies GI distress, chewing/swallowing difficulty, micronutrient supplements. NKFA

## 2020-09-04 NOTE — PROGRESS NOTE ADULT - PROBLEM SELECTOR PLAN 9
Plan of care and findings discussed with wife yesterday, as well as PCP. There are no known advance directives, which should be discussed prior to discharge.

## 2020-09-04 NOTE — DIETITIAN INITIAL EVALUATION ADULT. - ENERGY NEEDS
Ht: 6'0", Wt: 157lbs, BMI: 21.2kg/m2, IBW: 178(+/-10%), 88%IBW  Pertinent information: 89 year old male with PMHx of Afib, HTN, CABG, PPM and dementia, presents s/p fall. Found with Right femoral head fracture and multiple rib fractures.   No Edema, Skin: intact

## 2020-09-04 NOTE — PROGRESS NOTE ADULT - SUBJECTIVE AND OBJECTIVE BOX
HISTORY    Patient is a 89 year old male with a PMH of afib (on Eliquis), HTN, s/p CABG, PPM, and dementia presenting to ED s/p unwitnessed fall two days ago. Per patient's wife, patient fell 2 days ago in the morning and hit his head with no LOC. Patient has become more agitated over the past few days, prompting trip to ED. Of note, patient has been at rehab s/p hospitalization for iliac fracture in November of 2019. Per wife, he stopped taking home eliquis.     24 HOUR EVENTS:    listed for floor  non op management for right femoral head and right acetabulum fracture   soft diet started  home meds restarted   IV locked    SUBJECTIVE/ROS:  [ ] A ten-point review of systems was otherwise negative except as noted.  [ ] Due to altered mental status/intubation, subjective information were not able to be obtained from the patient. History was obtained, to the extent possible, from review of the chart and collateral sources of information.      NEURO    Exam: AOx2  Meds: acetaminophen   Tablet .. 650 milliGRAM(s) Oral every 6 hours PRN Mild Pain (1 - 3)  memantine 10 milliGRAM(s) Oral two times a day    [x] Adequacy of sedation and pain control has been assessed and adjusted      RESPIRATORY  RR: 16 (09-04-20 @ 04:00) (10 - 27)  SpO2: 97% (09-04-20 @ 04:00) (94% - 100%)  Wt(kg): --  Exam: CTA b/l, chest rise equal, no increase work of breathing      [ ] Extubation Readiness Assessed  Meds:       CARDIOVASCULAR  HR: 98 (09-04-20 @ 04:00) (76 - 98)  BP: 144/67 (09-04-20 @ 04:00) (90/55 - 147/71)  BP(mean): 97 (09-04-20 @ 04:00) (67 - 102)  ABP: --  ABP(mean): --  Wt(kg): --  CVP(cm H2O): --  VBG - ( 04 Sep 2020 00:20 )  pH: 7.41  /  pCO2: 45    /  pO2: 25    / HCO3: 28    / Base Excess: 3.4   /  SaO2: 39     Lactate: 1.0                Exam:  Cardiac Rhythm:  Perfusion     [x ]Adequate   [ ]Inadequate  Mentation   [ x]Normal       [ ]Reduced  Extremities  [x ]Warm         [ ]Cool  Volume Status [ ]Hypervolemic [x ]Euvolemic [ ]Hypovolemic  Meds: metoprolol succinate ER 25 milliGRAM(s) Oral daily  tamsulosin 0.4 milliGRAM(s) Oral at bedtime        GI/NUTRITION  Exam: belly soft, non tender, non distended  Diet: soft      GENITOURINARY  I&O's Detail    09-02 @ 07:01  -  09-03 @ 07:00  --------------------------------------------------------  IN:    IV PiggyBack: 200 mL    Lactated Ringers IV Bolus: 500 mL    lactated ringers.: 525 mL    Sodium Chloride 0.9% IV Bolus: 500 mL    Solution: 187.5 mL  Total IN: 1912.5 mL    OUT:    Indwelling Catheter - Urethral: 450 mL    Voided: 350 mL  Total OUT: 800 mL    Total NET: 1112.5 mL      09-03 @ 07:01 - 09-04 @ 04:50  --------------------------------------------------------  IN:    IV PiggyBack: 200 mL    lactated ringers.: 225 mL    Solution: 62.5 mL  Total IN: 487.5 mL    OUT:    Indwelling Catheter - Urethral: 1020 mL  Total OUT: 1020 mL    Total NET: -532.5 mL          09-04    139  |  106  |  30<H>  ----------------------------<  103<H>  4.4   |  23  |  0.85    Ca    10.0      04 Sep 2020 00:36  Phos  2.5     09-04  Mg     2.2     09-04    TPro  6.6  /  Alb  3.6  /  TBili  0.6  /  DBili  x   /  AST  43<H>  /  ALT  42  /  AlkPhos  83  09-02    [ ] Delgado catheter, indication:   Meds: cholecalciferol 1000 Unit(s) Oral daily  sodium phosphate IVPB 15 milliMole(s) IV Intermittent once        HEMATOLOGIC  Meds: enoxaparin Injectable 40 milliGRAM(s) SubCutaneous daily    [x] VTE Prophylaxis                        8.3    8.99  )-----------( 147      ( 04 Sep 2020 00:36 )             26.5     PT/INR - ( 04 Sep 2020 00:36 )   PT: 13.0 sec;   INR: 1.10 ratio         PTT - ( 04 Sep 2020 00:36 )  PTT:21.7 sec  Transfusion     [ ] PRBC   [ ] Platelets   [ ] FFP   [ ] Cryoprecipitate      INFECTIOUS DISEASES  T(C): 36.9 (09-04-20 @ 03:00), Max: 37.1 (09-03-20 @ 19:00)  Wt(kg): --  WBC Count: 8.99 K/uL (09-04 @ 00:36)  WBC Count: 7.69 K/uL (09-03 @ 10:42)  WBC Count: 5.01 K/uL (09-03 @ 09:52)    Recent Cultures:  Specimen Source: .Urine Clean Catch (Midstream), 09-02 @ 15:29; Results   <10,000 CFU/mL Normal Urogenital Anna; Gram Stain: --; Organism: --  Specimen Source: .Blood Blood-Peripheral, 09-02 @ 15:06; Results   No growth to date.; Gram Stain: --; Organism: --    Meds:       ENDOCRINE  Capillary Blood Glucose    Meds: atorvastatin 80 milliGRAM(s) Oral at bedtime  finasteride 5 milliGRAM(s) Oral daily  predniSONE   Tablet 1 milliGRAM(s) Oral <User Schedule>  predniSONE   Tablet 2 milliGRAM(s) Oral <User Schedule>        ACCESS DEVICES:  [ ] Peripheral IV  [ ] Central Venous Line	[ ] R	[ ] L	[ ] IJ	[ ] Fem	[ ] SC	Placed:   [ ] Arterial Line		[ ] R	[ ] L	[ ] Fem	[ ] Rad	[ ] Ax	Placed:   [ ] PICC:					[ ] Mediport  [ ] Urinary Catheter, Date Placed:   [ ] Necessity of urinary, arterial, and venous catheters discussed    OTHER MEDICATIONS:  chlorhexidine 2% Cloths 1 Application(s) Topical <User Schedule>  lidocaine   Patch 2 Patch Transdermal daily      CODE STATUS:     IMAGING:

## 2020-09-04 NOTE — PHYSICAL THERAPY INITIAL EVALUATION ADULT - ADDITIONAL COMMENTS
CTA CHEST, ABDOMEN/PELVIS: Suboptimal evaluation of the subsegmental arteries. Otherwise, no PE. Extensive fractures of the right hemipelvis including a nondisplaced fracture of the right femoral head. Associated soft tissue hematomas involving the right obturator internus and iliacus muscles with extension in the retroperitoneum. Multiple acute fractures of the right ribs, including a displaced right fourth rib fracture.  XR R HIP:  Extensive fractures involving the right iliac bone, acetabulum and femur better evaluated on CT abdomen/pelvis performed on the same day.  CT BRAIN: No acute intracranial hemorrhage, brain edema, or mass effect. No displaced calvarial fracture. No appreciable scalp hematoma.  CT CERVICAL SPINE: No acute fracture or traumatic subluxation. No prevertebral soft tissue swelling. Degenerative changes.

## 2020-09-04 NOTE — PROGRESS NOTE ADULT - PROBLEM SELECTOR PLAN 1
1. Recommend multimodal pain control with tylenol 1000 mg PO TID, lidoderm patches. Would avoid NSAIDs given bleeding and relative thrombocytopenia. If more significant pain control is needed, would consider either tramadol or low dose oxycodone (2.5) on a PRN basis.  2. Bowel regimen  3. Orthopedics follow-up - no plan for OR at this time.  4. OOB to chair when OK by orthopedics.  5. Eventual PT/OT evaluations.  6. Continue vitamin D 1000 IU PO daily for presumed osteoporosis.  7. Incentive spirometry.  8. Monitor O2 saturations.

## 2020-09-04 NOTE — PROGRESS NOTE ADULT - SUBJECTIVE AND OBJECTIVE BOX
[facility] Division of Hospital Medicine  Tin Stephenson MD  Pager (NABILA, 2P-4Q): [phone]  Other Times:  [phone]    Patient is a 89y old  Male who presents with a chief complaint of AMS s/p unwitnessed fall (04 Sep 2020 04:49)    SUBJECTIVE / OVERNIGHT EVENTS: No events overnight. Looks great this morning. He states he is feeling well. More talkative today. Notes pain on right side of body that is worse with movement. No anterior chest pain. No nausea/vomiting. No fever/chills.    MEDICATIONS  (STANDING):  atorvastatin 80 milliGRAM(s) Oral at bedtime  chlorhexidine 2% Cloths 1 Application(s) Topical <User Schedule>  cholecalciferol 1000 Unit(s) Oral daily  enoxaparin Injectable 40 milliGRAM(s) SubCutaneous daily  finasteride 5 milliGRAM(s) Oral daily  lidocaine   Patch 2 Patch Transdermal daily  memantine 10 milliGRAM(s) Oral two times a day  metoprolol succinate ER 25 milliGRAM(s) Oral daily  predniSONE   Tablet 1 milliGRAM(s) Oral <User Schedule>  predniSONE   Tablet 2 milliGRAM(s) Oral <User Schedule>  tamsulosin 0.4 milliGRAM(s) Oral at bedtime  traMADol 25 milliGRAM(s) Oral once    MEDICATIONS  (PRN):  acetaminophen   Tablet .. 650 milliGRAM(s) Oral every 6 hours PRN Mild Pain (1 - 3)      CAPILLARY BLOOD GLUCOSE        I&O's Summary    03 Sep 2020 07:  -  04 Sep 2020 07:00  --------------------------------------------------------  IN: 675 mL / OUT: 1145 mL / NET: -470 mL    04 Sep 2020 07:01  -  04 Sep 2020 10:58  --------------------------------------------------------  IN: 542.5 mL / OUT: 110 mL / NET: 432.5 mL        PHYSICAL EXAM:  Vital Signs Last 24 Hrs  T(C): 36.8 (04 Sep 2020 07:00), Max: 37.1 (03 Sep 2020 19:00)  T(F): 98.2 (04 Sep 2020 07:00), Max: 98.8 (03 Sep 2020 19:00)  HR: 102 (04 Sep 2020 10:00) (76 - 103)  BP: 110/82 (04 Sep 2020 10:00) (95/54 - 160/76)  BP(mean): 92 (04 Sep 2020 10:00) (71 - 109)  RR: 36 (04 Sep 2020 10:00) (11 - 36)  SpO2: 97% (04 Sep 2020 10:00) (94% - 100%)  GENERAL: Frail/cachectic appearing man in NAD, resting comfortably in bed in SICU  EYES: EOMI, pupils constricted b/l and equal, conjunctiva and sclera clear  ENMT: Moist mucous membranes. Poor dentition.  RESPIRATORY: Clear to auscultation bilaterally anteriorly; No rales, rhonchi, wheezing, or rubs  CARDIOVASCULAR: Irregularly irregular and fast; + crescendo-decrescendo systolic murmur, loudest in aortic area. No rubs or gallops  GASTROINTESTINAL: Soft, Nontender, Nondistended; Bowel sounds present  GENITOURINARY: + Delgado catheter with clear urine output  EXTREMITIES:  2+ Peripheral Pulses, No clubbing, cyanosis, or edema  MSK: No tenderness to palpation over lateral or anterior R chest wall. No tenderness to palpation over R hip and no obvious swelling noted.  NERVOUS SYSTEM:  Alert & Oriented to person, month, day but not year; Moving all 4 extremities; No gross sensory deficits. Answering questions appropriately.    LABS:                        8.3    8.99  )-----------( 147      ( 04 Sep 2020 00:36 )             26.5     09-04    139  |  106  |  30<H>  ----------------------------<  103<H>  4.4   |  23  |  0.85    Ca    10.0      04 Sep 2020 00:36  Phos  2.5     09-04  Mg     2.2     09-04    TPro  6.6  /  Alb  3.6  /  TBili  0.6  /  DBili  x   /  AST  43<H>  /  ALT  42  /  AlkPhos  83  -    PT/INR - ( 04 Sep 2020 00:36 )   PT: 13.0 sec;   INR: 1.10 ratio         PTT - ( 04 Sep 2020 00:36 )  PTT:21.7 sec      Urinalysis Basic - ( 02 Sep 2020 12:12 )    Color: Light Yellow / Appearance: Clear / S.028 / pH: x  Gluc: x / Ketone: Negative  / Bili: Negative / Urobili: Negative   Blood: x / Protein: 30 mg/dL / Nitrite: Negative   Leuk Esterase: Negative / RBC: 2 /hpf / WBC 1 /HPF   Sq Epi: x / Non Sq Epi: 1 /hpf / Bacteria: Negative        Culture - Urine (collected 02 Sep 2020 15:29)  Source: .Urine Clean Catch (Midstream)  Final Report (03 Sep 2020 10:41):    <10,000 CFU/mL Normal Urogenital Anna    Culture - Blood (collected 02 Sep 2020 15:06)  Source: .Blood Blood-Peripheral  Preliminary Report (03 Sep 2020 16:00):    No growth to date.    Culture - Blood (collected 02 Sep 2020 15:06)  Source: .Blood Blood-Peripheral  Preliminary Report (03 Sep 2020 16:00):    No growth to date.        RADIOLOGY & ADDITIONAL TESTS:  Results Reviewed:   Imaging Personally Reviewed: CXR : Clear lungs. +PPM.  Electrocardiogram Personally Reviewed: Af with RVR on monitor in SICU    COORDINATION OF CARE:  Care Discussed with Consultants/Other Providers [Y]: Dr. Mcgowan regarding pain management regimen.

## 2020-09-04 NOTE — OCCUPATIONAL THERAPY INITIAL EVALUATION ADULT - PERTINENT HX OF CURRENT PROBLEM, REHAB EVAL
89y Male presents to [facility] ED s/p [facility] fall c/o severe R hip pain and inability to ambulate after an unwitnessed falll. Pt is a poor historian and hx was largely obtaeind from wife at bedside. Wife reports that patient had an unwitneseed fall monday morning and has had difficulty ambulating since that time. He is unable to bear weight on the right lower extremity. Patient walks with walker. Per ortho no plan for operative intervention at this time.

## 2020-09-04 NOTE — PHYSICAL THERAPY INITIAL EVALUATION ADULT - LIVES WITH, PROFILE
Pt resides with spouse in a  with 3 steps to enter and full flight inside. Prior to admission the pt is independent with ADLs and required AD for ambulation, has RW and cane/spouse

## 2020-09-04 NOTE — PROGRESS NOTE ADULT - PROBLEM SELECTOR PLAN 4
1. Continue to hold eliqius given bleeding as above.  2. Continue home dose toprol XL 25 mg PO daily. Can increase to 50 mg if tachycardia persists.

## 2020-09-04 NOTE — DIETITIAN INITIAL EVALUATION ADULT. - ADD RECOMMEND
1. Add ensure enlive x2 daily to supplement PO intake 2. provide feeding assistance as needed 3. encourage adequate intake

## 2020-09-04 NOTE — OCCUPATIONAL THERAPY INITIAL EVALUATION ADULT - DIAGNOSIS, OT EVAL
Pt presents with pain,  baseline impaired cognition, decreased ROM, strength, endurance, balance, and coordination, all impacting ability to perform ADLs and functional mobility.

## 2020-09-04 NOTE — PROGRESS NOTE ADULT - SUBJECTIVE AND OBJECTIVE BOX
SURGERY  Pager: [phone]     INTERVAL EVENTS/SUBJECTIVE: No acute events overnight, patient pleasant and endorsing tolerable pain. Looking forward to his wife visiting him this afternoon. No other complaints. Tolerating soft diet.     ______________________________________________  OBJECTIVE:   T(C): 37.1 (09-04-20 @ 11:00), Max: 37.1 (09-03-20 @ 19:00)  HR: 101 (09-04-20 @ 11:00) (76 - 116)  BP: 129/61 (09-04-20 @ 11:00) (95/54 - 178/96)  RR: 22 (09-04-20 @ 11:00) (11 - 40)  SpO2: 97% (09-04-20 @ 10:58) (94% - 100%)  Wt(kg): --  CAPILLARY BLOOD GLUCOSE        I&O's Detail    03 Sep 2020 07:01  -  04 Sep 2020 07:00  --------------------------------------------------------  IN:    IV PiggyBack: 200 mL    lactated ringers.: 225 mL    Solution: 250 mL  Total IN: 675 mL    OUT:    Indwelling Catheter - Urethral: 1145 mL  Total OUT: 1145 mL    Total NET: -470 mL      04 Sep 2020 07:01  -  04 Sep 2020 11:37  --------------------------------------------------------  IN:    Oral Fluid: 480 mL    Solution: 62.5 mL  Total IN: 542.5 mL    OUT:    Indwelling Catheter - Urethral: 110 mL  Total OUT: 110 mL    Total NET: 432.5 mL      Physical exam:  Gen: NAD, lying in bed, pleasant and joking with team, AOX3   Chest: good inspiratory effort, breathing comfortably on RA   Abdomen: soft, nondtender, nondistended   Vascular: WWP, some ecchymoses on forearms, similar with prior exam   ______________________________________________  LABS:  CBC Full  -  ( 04 Sep 2020 00:36 )  WBC Count : 8.99 K/uL  RBC Count : 2.58 M/uL  Hemoglobin : 8.3 g/dL  Hematocrit : 26.5 %  Platelet Count - Automated : 147 K/uL  Mean Cell Volume : 102.7 fl  Mean Cell Hemoglobin : 32.2 pg  Mean Cell Hemoglobin Concentration : 31.3 gm/dL  Auto Neutrophil # : x  Auto Lymphocyte # : x  Auto Monocyte # : x  Auto Eosinophil # : x  Auto Basophil # : x  Auto Neutrophil % : x  Auto Lymphocyte % : x  Auto Monocyte % : x  Auto Eosinophil % : x  Auto Basophil % : x    09-04    139  |  106  |  30<H>  ----------------------------<  103<H>  4.4   |  23  |  0.85    Ca    10.0      04 Sep 2020 00:36  Phos  2.5     09-04  Mg     2.2     09-04      _____________________________________________  RADIOLOGY: SURGERY  Pager: [phone]     INTERVAL EVENTS/SUBJECTIVE: No acute events overnight, patient pleasant and endorsing tolerable pain. Looking forward to his wife visiting him this afternoon. No other complaints. Tolerating soft diet.     ______________________________________________  OBJECTIVE:   T(C): 37.1 (09-04-20 @ 11:00), Max: 37.1 (09-03-20 @ 19:00)  HR: 101 (09-04-20 @ 11:00) (76 - 116)  BP: 129/61 (09-04-20 @ 11:00) (95/54 - 178/96)  RR: 22 (09-04-20 @ 11:00) (11 - 40)  SpO2: 97% (09-04-20 @ 10:58) (94% - 100%)  Wt(kg): --  CAPILLARY BLOOD GLUCOSE        I&O's Detail    03 Sep 2020 07:01  -  04 Sep 2020 07:00  --------------------------------------------------------  IN:    IV PiggyBack: 200 mL    lactated ringers.: 225 mL    Solution: 250 mL  Total IN: 675 mL    OUT:    Indwelling Catheter - Urethral: 1145 mL  Total OUT: 1145 mL    Total NET: -470 mL      04 Sep 2020 07:01  -  04 Sep 2020 11:37  --------------------------------------------------------  IN:    Oral Fluid: 480 mL    Solution: 62.5 mL  Total IN: 542.5 mL    OUT:    Indwelling Catheter - Urethral: 110 mL  Total OUT: 110 mL    Total NET: 432.5 mL      Physical exam:  Gen: NAD, lying in bed, pleasant and joking with team, AOX3   Chest: good inspiratory effort, breathing comfortably on RA; pulling 1000 on IS  Abdomen: soft, nontender, nondistended   Vascular: WWP, some ecchymoses on forearms, similar with prior exam   ______________________________________________  LABS:  CBC Full  -  ( 04 Sep 2020 00:36 )  WBC Count : 8.99 K/uL  RBC Count : 2.58 M/uL  Hemoglobin : 8.3 g/dL  Hematocrit : 26.5 %  Platelet Count - Automated : 147 K/uL  Mean Cell Volume : 102.7 fl  Mean Cell Hemoglobin : 32.2 pg  Mean Cell Hemoglobin Concentration : 31.3 gm/dL  Auto Neutrophil # : x  Auto Lymphocyte # : x  Auto Monocyte # : x  Auto Eosinophil # : x  Auto Basophil # : x  Auto Neutrophil % : x  Auto Lymphocyte % : x  Auto Monocyte % : x  Auto Eosinophil % : x  Auto Basophil % : x    09-04    139  |  106  |  30<H>  ----------------------------<  103<H>  4.4   |  23  |  0.85    Ca    10.0      04 Sep 2020 00:36  Phos  2.5     09-04  Mg     2.2     09-04      _____________________________________________  RADIOLOGY:

## 2020-09-04 NOTE — DIETITIAN INITIAL EVALUATION ADULT. - +GENDER
Statement Selected Tetracycline Pregnancy And Lactation Text: This medication is Pregnancy Category D and not consider safe during pregnancy. It is also excreted in breast milk.

## 2020-09-04 NOTE — DIETITIAN INITIAL EVALUATION ADULT. - PERTINENT LABORATORY DATA
09-04 @ 00:36: Sodium 139, Potassium 4.4, Chloride 106, Calcium 10.0, Magnesium 2.2, Phosphorus 2.5, BUN 30<H>, Creatinine 0.85, <H>, Alk Phos --, ALT/SGPT --, AST/SGOT --, HbA1c --, Total Protein --, Albumin --, Prealbumin --, Total Bilirubin --, Direct Bilirubin --, Hemoglobin 8.3<L>, Hematocrit 26.5<L>

## 2020-09-04 NOTE — DIETITIAN INITIAL EVALUATION ADULT. - FACTORS AFF FOOD INTAKE
other (specify)/Pt denies difficulty chewing but is a very poor dentition and does not have his dentures at this time.

## 2020-09-04 NOTE — DIETITIAN INITIAL EVALUATION ADULT. - PHYSICAL APPEARANCE
other (specify)/underweight Nutrition focused physical exam: Moderate muscle wasting at temporals, clavicles, scapulas, and calfs.   Moderate fat wasting at orbital; ribs avoids 2/2 rib fractures.  Unclear if due to nutritional deficiencies vs age

## 2020-09-04 NOTE — PHYSICAL THERAPY INITIAL EVALUATION ADULT - CRITERIA FOR SKILLED THERAPEUTIC INTERVENTIONS
rehab potential/anticipated equipment needs at discharge/risk reduction/prevention/therapy frequency/anticipated discharge recommendation/impairments found/functional limitations in following categories/predicted duration of therapy intervention

## 2020-09-05 NOTE — PROGRESS NOTE ADULT - ASSESSMENT
Patient is a 89M w/ PMH Afib (on Eliquis), s/p CABG, PPM, HTN, dementia, presenting due to fall two days prior to presentation as per wife. Found to have extensive fractures of the R hemipelvis with nondisplaced fx of R femoral head, associated soft tissue hematomas involving R obturator internus and iliacus muscles, with extension into RP. Multiple R rib fx.  Patient admitted to SICU for hemorrhage watch.     Neuro: dementia  -resume home Memantine  -A&Ox1 at baseline  -Pain: not reporting any pain. Tylenol PRN and lidoderm patch    Resp: multiple right rib fx  -Saturating well on RA  -encourage incentive spirometer use  -prevent atlectasis    CV: -Hx HTN and Afib on Eliquis (holding   - resume home: metoprolol 25mg daily with holding  -Lactate 1.4  -atorvastatin 80mg qhs    GI  - soft diet      /Renal: normal bun/creatning  -IVL while on diet  - continue home: finasteride and tamsulosin    Heme:   -Hematoma in presacral space, hemorrhage watch now off  -Holding home eliquis in the setting of fall with hematoma  - DVT ppx: lovenox sc    ID: no issues  -monitor wbc, and temp    Endo:   -monitor glucose from bmp  -resume home: calcium carbonate and vit D3  - resume home prednisone    MSK  -R 1-6 rib fx, L 10, 11 fx   -R inferior pubic ramus fx, R comminuted acetabulum fx, prior fx of R iliac wing with possible acute fx. R RP hematoma  -Per ortho, weight bearing as tolerated    Dispo:  humberto for floors

## 2020-09-05 NOTE — PROGRESS NOTE ADULT - SUBJECTIVE AND OBJECTIVE BOX
HISTORY  Patient is a 89M w/ PMH Afib (on Eliquis), s/p CABG, PPM, HTN, dementia, presenting due to fall two days prior to presentation as per wife. Unwitnessed fall, with no LOC, and progressvily worsening mental status. (A&Ox1 at baseline). Found to have extensive fractures of the R hemipelvis with nondisplaced fx of R femoral head, associated soft tissue hematomas involving R obturator internus and iliacus muscles, with extension into RP. Multiple R rib fx.  Patient admitted to SICU for hemorrhage watch.     24 HOUR EVENTS:  -improved IS  -off hemorrhage watch  -listed for regular floor    SUBJECTIVE/ROS:  [x ] A ten-point review of systems was otherwise negative except as noted.  [ ] Due to altered mental status/intubation, subjective information were not able to be obtained from the patient. History was obtained, to the extent possible, from review of the chart and collateral sources of information.      NEURO  Exam: awake, alert,  Meds: acetaminophen   Tablet .. 650 milliGRAM(s) Oral every 6 hours PRN Mild Pain (1 - 3)  memantine 10 milliGRAM(s) Oral two times a day    [x] Adequacy of sedation and pain control has been assessed and adjusted      RESPIRATORY  RR: 13 (09-05-20 @ 00:00) (13 - 49)  SpO2: 97% (09-05-20 @ 00:00) (82% - 100%)  Exam: unlabored, clear to auscultation bilaterally  Mechanical Ventilation:     [N/A] Extubation Readiness Assessed      CARDIOVASCULAR  HR: 110 (09-05-20 @ 00:00) (94 - 116)  BP: 140/71 (09-05-20 @ 00:00) (95/54 - 178/96)  BP(mean): 99 (09-05-20 @ 00:00) (68 - 128)  VBG - ( 04 Sep 2020 00:20 )  pH: 7.41  /  pCO2: 45    /  pO2: 25    / HCO3: 28    / Base Excess: 3.4   /  SaO2: 39     Lactate: 1.0      Exam: regular rate and rhythm  Cardiac Rhythm: sinus  Perfusion     [x]Adequate   [ ]Inadequate  Mentation   [x]Normal       [ ]Reduced  Extremities  [x]Warm         [ ]Cool  Volume Status [ ]Hypervolemic [x]Euvolemic [ ]Hypovolemic  Meds: metoprolol succinate ER 25 milliGRAM(s) Oral daily  tamsulosin 0.4 milliGRAM(s) Oral at bedtime        GI/NUTRITION  Exam: soft, nontender, nondistended, incision C/D/I  Diet:  Meds: calcium carbonate    500 mG (Tums) Chewable 1 Tablet(s) Chew every 4 hours PRN Heartburn      GENITOURINARY  I&O's Detail    09-03 @ 07:01  -  09-04 @ 07:00  --------------------------------------------------------  IN:    IV PiggyBack: 200 mL    lactated ringers.: 225 mL    Solution: 250 mL  Total IN: 675 mL    OUT:    Indwelling Catheter - Urethral: 1145 mL  Total OUT: 1145 mL    Total NET: -470 mL      09-04 @ 07:01  -  09-05 @ 01:14  --------------------------------------------------------  IN:    Oral Fluid: 1080 mL    Solution: 62.5 mL  Total IN: 1142.5 mL    OUT:    Indwelling Catheter - Urethral: 470 mL  Total OUT: 470 mL    Total NET: 672.5 mL          09-05    138  |  104  |  37<H>  ----------------------------<  122<H>  4.5   |  22  |  1.00    Ca    9.7      05 Sep 2020 00:16  Phos  3.0     09-05  Mg     2.2     09-05      [ ] Delgado catheter, indication: N/A  Meds: cholecalciferol 1000 Unit(s) Oral daily        HEMATOLOGIC  Meds: enoxaparin Injectable 40 milliGRAM(s) SubCutaneous daily    [x] VTE Prophylaxis                        8.1    8.19  )-----------( 151      ( 05 Sep 2020 00:16 )             26.1     PT/INR - ( 05 Sep 2020 00:16 )   PT: 12.7 sec;   INR: 1.07 ratio         PTT - ( 05 Sep 2020 00:16 )  PTT:24.1 sec  Transfusion     [ ] PRBC   [ ] Platelets   [ ] FFP   [ ] Cryoprecipitate      INFECTIOUS DISEASES  WBC Count: 8.19 K/uL (09-05 @ 00:16)    RECENT CULTURES:  Specimen Source: .Urine Clean Catch (Midstream)  Date/Time: 09-02 @ 15:29  Culture Results:   <10,000 CFU/mL Normal Urogenital Anna  Gram Stain: --  Organism: --  Specimen Source: .Blood Blood-Peripheral  Date/Time: 09-02 @ 15:06  Culture Results:   No growth to date.  Gram Stain: --  Organism: --    Meds:       ENDOCRINE  CAPILLARY BLOOD GLUCOSE        Meds: atorvastatin 80 milliGRAM(s) Oral at bedtime  finasteride 5 milliGRAM(s) Oral daily  predniSONE   Tablet 1 milliGRAM(s) Oral <User Schedule>  predniSONE   Tablet 2 milliGRAM(s) Oral <User Schedule>        ACCESS DEVICES:  [ ] Peripheral IV  [ ] Central Venous Line	[ ] R	[ ] L	[ ] IJ	[ ] Fem	[ ] SC	Placed:   [ ] Arterial Line		[ ] R	[ ] L	[ ] Fem	[ ] Rad	[ ] Ax	Placed:   [ ] PICC:					[ ] Mediport  [ ] Urinary Catheter, Date Placed:   [x] Necessity of urinary, arterial, and venous catheters discussed    OTHER MEDICATIONS:  chlorhexidine 2% Cloths 1 Application(s) Topical <User Schedule>  lidocaine   Patch 2 Patch Transdermal daily      CODE STATUS:      IMAGING:

## 2020-09-05 NOTE — PROGRESS NOTE ADULT - ASSESSMENT
89M PMH of afib (on Eliquis), HTN, s/p CABG, PPM, and dementia found to have fractures of R hemipelvis including nondisplaced fx of R femoral head, R acetabulum, soft tissue hematomas extending into RP, multiple acute fx of ribs, including R side 1-6 and L 10-11. Clinically stable, off hemorrhage watch protocol, now on floor    Plan:   - Multimodal pain control: IV acetaminophen, lidocaine patch  - Diet: NPO + IVF   - Activity: OOB, IS    - Strict I's/O's  - DVT ppx: lovenox 40, b/l SCDs  - No acute intervention needed per Ortho, OP f/u  - TTWB RLE  - D/c planning: planning for [facility] (wife prefers [facility]) and contacted patient's PMD     Lois Smith PGY-1  [facility]  [phone]

## 2020-09-05 NOTE — PROGRESS NOTE ADULT - SUBJECTIVE AND OBJECTIVE BOX
GENERAL SURGERY PROGRESS NOTE    Patient: JONY JAEGER , 89y ([birth year])Male   MRN: [id number]  Location: [facility] 7TOW [id number] W1  Visit: 09-02-20 Inpatient  Date: 09-05-20 @ 19:26    Events of past 24 hours:  Straight cathed overnight for urinary retention. Conversational with team this morning, reading newspaper in chair. Denies pain. Now transferred from SICU to floor, less alert and interactive per wife.    PAST MEDICAL & SURGICAL HISTORY:  History of polymyalgia rheumatica: this is why patient is on chronic prednisone  HTN (hypertension)  BPH (benign prostatic hyperplasia)  Atrial fibrillation  H/O cardiac pacemaker  S/P CABG x 5      Vitals: T(F): 97.5 (09-05-20 @ 16:19), Max: 99.5 (09-04-20 @ 23:00)  HR: 97 (09-05-20 @ 16:19)  BP: 122/53 (09-05-20 @ 16:19)  RR: 22 (09-05-20 @ 16:19)  SpO2: 100% (09-05-20 @ 16:19)      Diet, Soft      09-04-20 @ 07:01  -  09-05-20 @ 07:00  --------------------------------------------------------  IN:    Oral Fluid: 1180 mL    Solution: 62.5 mL  Total IN: 1242.5 mL    OUT:    Indwelling Catheter - Urethral: 470 mL    Intermittent Catheterization - Urethral: 600 mL    Voided: 30 mL  Total OUT: 1100 mL    Total NET: 142.5 mL          PHYSICAL EXAM  General: NAD, resting comfortably  Neurology: A&Ox2, moves all extremities  Respiratory: nonlabored breathing, normal chest wall expansion, IS 1000, on 4L NC  Abdominal: Soft, NT, ND  Vascular: Warm and well perfused  Extremities: No edema  Lines/Drains: condom catheter in place    MEDICATIONS  (STANDING):  atorvastatin 80 milliGRAM(s) Oral at bedtime  cholecalciferol 1000 Unit(s) Oral daily  enoxaparin Injectable 40 milliGRAM(s) SubCutaneous daily  finasteride 5 milliGRAM(s) Oral daily  lidocaine   Patch 2 Patch Transdermal daily  memantine 10 milliGRAM(s) Oral two times a day  metoprolol tartrate 25 milliGRAM(s) Oral every 12 hours  predniSONE   Tablet 1 milliGRAM(s) Oral <User Schedule>  predniSONE   Tablet 2 milliGRAM(s) Oral <User Schedule>  tamsulosin 0.4 milliGRAM(s) Oral at bedtime    MEDICATIONS  (PRN):  acetaminophen   Tablet .. 650 milliGRAM(s) Oral every 6 hours PRN Mild Pain (1 - 3)  calcium carbonate    500 mG (Tums) Chewable 1 Tablet(s) Chew every 4 hours PRN Heartburn      LAB/STUDIES:  CAPILLARY BLOOD GLUCOSE                              8.1    8.19  )-----------( 151      ( 05 Sep 2020 00:16 )             26.1     09-05    138  |  104  |  37<H>  ----------------------------<  122<H>  4.5   |  22  |  1.00    Ca    9.7      05 Sep 2020 00:16  Phos  3.0     09-05  Mg     2.2     09-05        PT/INR - ( 05 Sep 2020 00:16 )   PT: 12.7 sec;   INR: 1.07 ratio         PTT - ( 05 Sep 2020 00:16 )  PTT:24.1 sec              IMAGING:  < from: Xray Chest 1 View- PORTABLE-Urgent (09.05.20 @ 07:39) >  FINDINGS:  Patient's head obscures evaluation of the lung apices.  The lungs are clear, allowing for technique.  There is no pleural effusion or pneumothorax.  Status post median sternotomy. Left chest wall pacemaker.  The heart size is not well evaluated on this projection.  Displaced right fourth rib fracture again noted.    IMPRESSION:  Clear lungs, allowing for technique.    < end of copied text >

## 2020-09-06 NOTE — PROGRESS NOTE ADULT - SUBJECTIVE AND OBJECTIVE BOX
SURGERY  Pager: #[phone]     INTERVAL EVENTS/SUBJECTIVE: Patient transferred to surgical floor from SICU. Passed TOV, CXR showing no evidence of PNA. Weaned off 4L n/c. Waxing and waning mental status. Pain well controlled, no other complaints.     ______________________________________________  OBJECTIVE:   T(C): 36.8 (09-06-20 @ 09:27), Max: 36.8 (09-06-20 @ 09:27)  HR: 96 (09-06-20 @ 09:27) (69 - 104)  BP: 115/66 (09-06-20 @ 09:27) (102/72 - 124/59)  RR: 16 (09-06-20 @ 09:27) (16 - 34)  SpO2: 94% (09-06-20 @ 09:27) (94% - 100%)  Wt(kg): --  CAPILLARY BLOOD GLUCOSE        I&O's Detail    05 Sep 2020 07:01  -  06 Sep 2020 07:00  --------------------------------------------------------  IN:    Oral Fluid: 950 mL  Total IN: 950 mL    OUT:    Voided: 850 mL  Total OUT: 850 mL    Total NET: 100 mL          Physical exam:  Gen: NAD, sitting up in bed   Chest: breathing comfortably on RA   Abdomen: soft, nontender, nondistended   Vascular: WWP   ______________________________________________  LABS:  CBC Full  -  ( 06 Sep 2020 06:50 )  WBC Count : 7.55 K/uL  RBC Count : 2.47 M/uL  Hemoglobin : 7.8 g/dL  Hematocrit : 25.2 %  Platelet Count - Automated : 167 K/uL  Mean Cell Volume : 102.0 fl  Mean Cell Hemoglobin : 31.6 pg  Mean Cell Hemoglobin Concentration : 31.0 gm/dL  Auto Neutrophil # : x  Auto Lymphocyte # : x  Auto Monocyte # : x  Auto Eosinophil # : x  Auto Basophil # : x  Auto Neutrophil % : x  Auto Lymphocyte % : x  Auto Monocyte % : x  Auto Eosinophil % : x  Auto Basophil % : x    09-06    141  |  107  |  30<H>  ----------------------------<  104<H>  4.7   |  26  |  0.81    Ca    9.8      06 Sep 2020 06:50  Phos  2.4     09-06  Mg     2.2     09-06      _____________________________________________  RADIOLOGY:

## 2020-09-06 NOTE — PROGRESS NOTE ADULT - ASSESSMENT
89M PMH of afib (on Eliquis), HTN, s/p CABG, PPM, and dementia found to have fractures of R hemipelvis including nondisplaced fx of R femoral head, R acetabulum, soft tissue hematomas extending into RP, multiple acute fx of ribs, including R side 1-6 and L 10-11. Clinically stable, off hemorrhage watch protocol, now on floor    Plan:   - Multimodal pain control: IV acetaminophen, lidocaine patch  - Diet: soft  - Activity: encourage OOB to chair, IS    - DVT ppx: lovenox 40, b/l SCDs  - No acute intervention needed per Ortho, OP f/u  - TTWB RLE  - D/c planning: planning for [facility] (wife prefers [facility]) and contacted patient's PMD     [facility]  #[id number] 89M PMH of afib (on Eliquis), HTN, s/p CABG, PPM, and dementia found to have fractures of R hemipelvis including nondisplaced fx of R femoral head, R acetabulum, soft tissue hematomas extending into RP, multiple acute fx of ribs, including R side 1-6 and L 10-11. Clinically stable, off hemorrhage watch protocol, now on floor    Plan:   - Multimodal pain control: IV acetaminophen, lidocaine patch  - C/w soft diet  - Activity: encourage OOB to chair, IS    - DVT ppx: lovenox 40, b/l SCDs  - No acute intervention needed per Ortho, OP f/u  - TTWB RLE  - D/c planning: planning for [facility] (wife prefers [facility]) and contacted patient's PMD     [facility]  #[id number]

## 2020-09-07 NOTE — PROGRESS NOTE ADULT - ATTENDING COMMENTS
Pt seen and examined on 9/7 with resident team, agree with above. Pt feeling well. Eliquis for afib being held due to ICH, which is stable, until outpatient follow up. B/L rib fractures, pain [id number]. TTWB RLE for R femoral head fracture. [facility].
Patient seen and examined and agree with resident note.  No events overngiht.   Current management includes:  Neuro/Pain -   CV-  chronic atrial fibrillation - started metoprolol   -rate controlled   Pulm- oxygenation and ventilation appropriate-  room air SpO2 97%  Right 1-6 rib fx  left 10, 11 rib fx  -multimodal pain control   H/H stable   Right comminuted acetabular fracture with TTWB    Patient stable for transfer to floor
Patient seen and examined and agree with resident note.  No events overnight.   Delgado removed and passed trial of void.   Current management includes:  Neuro/Pain - Pain controlled   CV-  chronic atrial fibrillation - continue metoprolol   -rate controlled   Pulm- oxygenation and ventilation appropriate-  nasal cannula SpO2 97%  Right 1-6 rib fx  left 10, 11 rib fx  -multimodal pain control   H/H stable   Right comminuted acetabular fracture with TTWB    Patient stable for transfer to floor
right 1-6 lateral mildly displaced rib fractures  left 10-11 posterolateral displace rib fractures  -pain control    small right traumatic hemothorax  trace left traumatic hemothorax  may also be pleural effusions  -repeat CXR tomorrow    right anterior and posterior column acetabular fractures  right femoral head avulsion fracture  -RLE TTWB given advanced age    small pelvic hematoma  -no evidence of ongoing hemorrhage based on stable vital signs and hgb  -continue to hold therapeutic anticoagulation (on Eliquis for afib)
seen with resident and with nurse at bedside  explained importance of being OOB, mobilization  nursing team to assist with ADL  will facilitate physical therapy assessment  to facilitate disposition planning

## 2020-09-07 NOTE — PROGRESS NOTE ADULT - SUBJECTIVE AND OBJECTIVE BOX
Subjective:   Patient seen at bedside this AM. Reports feeling well, without complaints. Denies chest pain, SOB. Tolerating diet without N/V. 1000 on ISS this morning.     24h Events:   - Overnight, no acute events    Objective:  Vital Signs  T(C): 37.2 (09-07 @ 08:25), Max: 37.6 (09-07 @ 00:21)  HR: 97 (09-07 @ 08:25) (84 - 97)  BP: 110/66 (09-07 @ 08:25) (102/47 - 126/72)  RR: 18 (09-07 @ 08:25) (16 - 18)  SpO2: 96% (09-07 @ 08:25) (95% - 96%)  09-06-20 @ 07:01  -  09-07-20 @ 07:00  --------------------------------------------------------  IN: 580 mL / OUT: 800 mL / NET: -220 mL    09-07-20 @ 07:01  -  09-07-20 @ 15:06  --------------------------------------------------------  IN: 240 mL / OUT: 0 mL / NET: 240 mL      Physical Exam:  GEN: resting in bed comfortably in NAD  RESP: no increased WOB  ABD: soft, non-distended, non-tender without rebound tenderness or guarding  EXTR: warm, well-perfused   NEURO: AAOx4    Labs:                        7.8    7.55  )-----------( 167      ( 06 Sep 2020 06:50 )             25.2   09-06    141  |  107  |  30<H>  ----------------------------<  104<H>  4.7   |  26  |  0.81    Ca    9.8      06 Sep 2020 06:50  Phos  2.4     09-06  Mg     2.2     09-06      Medications:   MEDICATIONS  (STANDING):  atorvastatin 80 milliGRAM(s) Oral at bedtime  cholecalciferol 1000 Unit(s) Oral daily  enoxaparin Injectable 40 milliGRAM(s) SubCutaneous daily  finasteride 5 milliGRAM(s) Oral daily  lidocaine   Patch 2 Patch Transdermal daily  memantine 10 milliGRAM(s) Oral two times a day  metoprolol tartrate 25 milliGRAM(s) Oral every 12 hours  predniSONE   Tablet 1 milliGRAM(s) Oral <User Schedule>  predniSONE   Tablet 2 milliGRAM(s) Oral <User Schedule>  tamsulosin 0.4 milliGRAM(s) Oral at bedtime    MEDICATIONS  (PRN):  acetaminophen   Tablet .. 650 milliGRAM(s) Oral every 6 hours PRN Mild Pain (1 - 3)  calcium carbonate    500 mG (Tums) Chewable 1 Tablet(s) Chew every 4 hours PRN Heartburn      Assessment:   89M PMH of afib (on Eliquis), HTN, s/p CABG, PPM, and dementia found to have fractures of R hemipelvis including nondisplaced fx of R femoral head, R acetabulum, soft tissue hematomas extending into RP, multiple acute fx of ribs, including R side 1-6 and L 10-11. Clinically stable on floor with improving mental status per patient and wife both. Dispo planning to [facility].       Plan:   - Multimodal pain control: IV acetaminophen, lidocaine patch  - C/w soft diet  - Activity: encourage OOB to chair, IS    - DVT ppx: lovenox 40, b/l SCDs  - No acute intervention needed per Ortho, OP f/u  - TTWB RLE  - D/c planning: planning for [facility] (wife prefers [facility])        Arlene Bravo, PGY-1  [facility]  [phone]

## 2020-09-08 NOTE — DISCHARGE NOTE PROVIDER - NSDCFUADDAPPT_GEN_ALL_CORE_FT
Please follow up with your Trauma Doctor, Dr Junior, only if needed at [address] [place] [address] [address] [address] , phone #[phone].    Dr. Murrieta, Your PMD--Please call for a follow up appointment in the next 1-2 weeks regarding your recent injury and hospitalization.    You may use Salanpas 1% lidocaine patches over the counter for topical pain relief. Please follow up with your Trauma Doctor, Dr Junior, only if needed at [address] [place] [address] [address] [address] , phone #[phone].    Dr. Murrieta, Your PMD--Please call for a follow up appointment in the next 1-2 weeks regarding your recent injury and hospitalization.  Please discuss when to resume your Eliquis.     You may use Salanpas 1% lidocaine patches over the counter for topical pain relief.

## 2020-09-08 NOTE — DISCHARGE NOTE PROVIDER - CARE PROVIDER_API CALL
Raz Lopez  ORTHOPAEDIC SURGERY  [address] [place] [address], [address]  [place] [address]  Phone: [phone]  Fax: [phone]  Follow Up Time: 2 weeks    Jessica Junior  SURGERY  [address] [place] [address], [address] [address]  Phone: [phone]  Fax: [phone]  Follow Up Time: Routine

## 2020-09-08 NOTE — PROGRESS NOTE ADULT - PROBLEM SELECTOR PLAN 1
1. Recommend multimodal pain control with tylenol 1000 mg PO TID, lidoderm patches. Would avoid NSAIDs given bleeding and relative thrombocytopenia. If more significant pain control is needed, would consider low dose oxycodone (2.5) on a PRN basis.  2. Bowel regimen  3. Orthopedics follow-up - no plan for OR at this time.  4. OOB to chair when OK by orthopedics.  5. Eventual PT/OT evaluations.  6. Continue vitamin D 1000 IU PO daily for presumed osteoporosis.  7. Incentive spirometry.  8. Monitor O2 saturations.

## 2020-09-08 NOTE — PROGRESS NOTE ADULT - PROBLEM SELECTOR PLAN 2
Appears stable based on H/H.  1. Continue to hold eliquis.  2. Trend CBC; if dropping, obtain repeat abdominal imaging  3. Plan to resume eliquis on outpatient basis if benefits outweigh risks in setting of stable RP bleed.

## 2020-09-08 NOTE — DISCHARGE NOTE PROVIDER - NSDCACTIVITY_GEN_ALL_CORE
Showering allowed/Do not make important decisions/Do not drive or operate machinery/No heavy lifting/straining

## 2020-09-08 NOTE — DISCHARGE NOTE PROVIDER - NSDCFUSCHEDAPPT_GEN_ALL_CORE_FT
JONY JAEGER ; 10/21/2020 ; [facility] [address] JONY JAEGER ; 10/21/2020 ; [facility] [address]

## 2020-09-08 NOTE — PROGRESS NOTE ADULT - ASSESSMENT
Assessment:   89M PMH of afib (on Eliquis), HTN, s/p CABG, PPM, and dementia found to have fractures of R hemipelvis including nondisplaced fx of R femoral head, R acetabulum, soft tissue hematomas extending into RP, multiple acute fx of ribs, including R side 1-6 and L 10-11. Clinically stable on floor with improving mental status per patient and wife both. Dispo planning to [facility].       Plan:   - Multimodal pain control: IV acetaminophen, lidocaine patch  - C/w soft diet  - Activity: encourage OOB to chair, IS    - DVT ppx: lovenox 40, b/l SCDs  - No acute intervention needed per Ortho, OP f/u  - TTWB RLE  - Dispo: Pending [facility] acceptance         Arlene Bravo, PGY-1  [facility]  [phone]

## 2020-09-08 NOTE — DISCHARGE NOTE PROVIDER - PROVIDER TOKENS
PROVIDER:[TOKEN:[[id number]],FOLLOWUP:[2 weeks]],PROVIDER:[TOKEN:[[id number]],FOLLOWUP:[Routine]]

## 2020-09-08 NOTE — PROGRESS NOTE ADULT - PROBLEM SELECTOR PLAN 4
1. Continue to hold eliqius given bleeding as above.  2. Continue metoprolol 25 mg PO BID. Can increase to 50 mg BID if tachycardia manifests

## 2020-09-08 NOTE — PROGRESS NOTE ADULT - PROBLEM SELECTOR PLAN 9
Findings and plan of care discussed with patient's PMD, Dr. Murrieta, last week and again today in preparation for discharge.    Plan is for d/c to [facility] once bed is available. Will need repeat COVID swab prior to d/c, to be sent today.

## 2020-09-08 NOTE — DISCHARGE NOTE PROVIDER - NSDCCPCAREPLAN_GEN_ALL_CORE_FT
PRINCIPAL DISCHARGE DIAGNOSIS  Diagnosis: Acetabular fracture  Assessment and Plan of Treatment: TTWB      SECONDARY DISCHARGE DIAGNOSES  Diagnosis: Rib fractures  Assessment and Plan of Treatment: pain control    Diagnosis: Longstanding persistent atrial fibrillation  Assessment and Plan of Treatment: Longstanding persistent atrial fibrillation    Diagnosis: Polymyalgia rheumatica  Assessment and Plan of Treatment: cont Steroids    Diagnosis: BPH (benign prostatic hyperplasia)  Assessment and Plan of Treatment: BPH (benign prostatic hyperplasia)

## 2020-09-08 NOTE — PROGRESS NOTE ADULT - SUBJECTIVE AND OBJECTIVE BOX
[facility] Division of Hospital Medicine  Tin Stephenson MD  Pager (NABILA, 2L-2H): [phone]  Other Times:  [phone]    Patient is a 89y old  Male who presents with a chief complaint of AMS s/p unwitnessed fall (08 Sep 2020 10:14)    SUBJECTIVE / OVERNIGHT EVENTS: No events overnight. Feels generally OK. Only has pain in hip when moving, no pain at rest. No chest pain. No SOB. No fever/chills. No nausea/vomiting.    MEDICATIONS  (STANDING):  atorvastatin 80 milliGRAM(s) Oral at bedtime  cholecalciferol 1000 Unit(s) Oral daily  enoxaparin Injectable 40 milliGRAM(s) SubCutaneous daily  finasteride 5 milliGRAM(s) Oral daily  lidocaine   Patch 2 Patch Transdermal daily  memantine 10 milliGRAM(s) Oral two times a day  metoprolol tartrate 25 milliGRAM(s) Oral every 12 hours  predniSONE   Tablet 1 milliGRAM(s) Oral <User Schedule>  predniSONE   Tablet 2 milliGRAM(s) Oral <User Schedule>  tamsulosin 0.4 milliGRAM(s) Oral at bedtime    MEDICATIONS  (PRN):  acetaminophen   Tablet .. 650 milliGRAM(s) Oral every 6 hours PRN Mild Pain (1 - 3)  calcium carbonate    500 mG (Tums) Chewable 1 Tablet(s) Chew every 4 hours PRN Heartburn      CAPILLARY BLOOD GLUCOSE        I&O's Summary    07 Sep 2020 07:01  -  08 Sep 2020 07:00  --------------------------------------------------------  IN: 510 mL / OUT: 1920 mL / NET: -1410 mL    08 Sep 2020 07:01  -  08 Sep 2020 13:28  --------------------------------------------------------  IN: 100 mL / OUT: 0 mL / NET: 100 mL        PHYSICAL EXAM:  Vital Signs Last 24 Hrs  T(C): 36.9 (08 Sep 2020 09:25), Max: 37.5 (07 Sep 2020 21:36)  T(F): 98.5 (08 Sep 2020 09:25), Max: 99.5 (07 Sep 2020 21:36)  HR: 97 (08 Sep 2020 09:25) (76 - 98)  BP: 121/65 (08 Sep 2020 09:25) (103/61 - 122/64)  BP(mean): --  RR: 18 (08 Sep 2020 09:25) (18 - 18)  SpO2: 94% (08 Sep 2020 09:25) (94% - 98%)  GENERAL: Frail/cachectic appearing man in NAD, resting comfortably in bed  EYES: EOMI, pupils constricted b/l and equal, conjunctiva and sclera clear  ENMT: Moist mucous membranes. Poor dentition.  RESPIRATORY: Clear to auscultation bilaterally anteriorly; No rales, rhonchi, wheezing, or rubs  CARDIOVASCULAR: Irregularly irregular and fast; + crescendo-decrescendo systolic murmur, loudest in aortic area. No rubs or gallops  GASTROINTESTINAL: Soft, Nontender, Nondistended; Bowel sounds present  EXTREMITIES:  2+ Peripheral Pulses, No clubbing, cyanosis, or edema  MSK: No tenderness to palpation over lateral or anterior R chest wall. No tenderness to palpation over R hip and no obvious swelling noted.  NERVOUS SYSTEM:  Alert & Oriented to person, month, day but not year; Moving all 4 extremities; No gross sensory deficits. Answering questions appropriately.    LABS:          PT/INR - ( 08 Sep 2020 08:44 )   PT: 13.0 sec;   INR: 1.11 ratio      RADIOLOGY & ADDITIONAL TESTS:  Results Reviewed: No new studies  Imaging Personally Reviewed: No new studies  Electrocardiogram Personally Reviewed: No new studies    COORDINATION OF CARE:  Care Discussed with Consultants/Other Providers [Y]: Trauma JONATHAN Sotelo regarding discharge planning and need for repeat COVID swab

## 2020-09-08 NOTE — DISCHARGE NOTE PROVIDER - HOSPITAL COURSE
89yMale w/ PMH Afib (on Eliquis), s/p CABG, PPM, HTN, dementia, presenting due to fall two days ago.     CT findings with extensive fractures of the R hemipelvis with nondisplaced fx of R femoral head, associated soft tissue hematomas involving R obturator internus and iliacus muscles, with extension into RP. Multiple R rib fx. He was placed on Hemorrhage watch and transferred to SICU for close observation. HCTs remained stable.  Per ortho, non op management for right femoral head and right acetabulum fracture.  PT/OT recommend [facility].  Transferred to floor. At the time of discharge, the patient was hemodynamically stable, tolerating PO diet, voiding urine, ambulating w assistance and was comfortable with adequate pain control. The patient was instructed to follow up with Dr. Lopez within 1-2 weeks after discharge. The patient/family felt comfortable with discharge. The patient was discharged to rehab. The patient had no other issues. 89y Male w/ PMH Afib (on Eliquis), s/p CABG, PPM, HTN, dementia, presenting due to fall two days ago.     CT findings with extensive fractures of the R hemipelvis with nondisplaced fx of R femoral head, associated soft tissue hematomas involving R obturator internus and iliacus muscles, with extension into RP. Multiple R rib fx. He was placed on Hemorrhage watch and transferred to SICU for close observation. HCTs remained stable.  Per ortho, non op management for right femoral head and right acetabulum fracture.  PT/OT recommend [facility].  He was transferred to floor.  COVID Negative.  At the time of discharge, the patient was hemodynamically stable, tolerating PO diet, voiding urine, ambulating w assistance and was comfortable with adequate pain control. The patient was instructed to follow up with Dr. Lopez within 1-2 weeks after discharge. The patient/family felt comfortable with discharge. The patient was discharged to rehab. The patient had no other issues.

## 2020-09-08 NOTE — PROGRESS NOTE ADULT - SUBJECTIVE AND OBJECTIVE BOX
Subjective:   Patient seen at bedside this AM. Reports feeling well, without complaints. Denies chest pain, SOB. Tolerating diet without N/V.      24h Events:   - Overnight, no acute events    Objective:  Vital Signs  T(C): 37.2 (09-07 @ 08:25), Max: 37.6 (09-07 @ 00:21)  HR: 97 (09-07 @ 08:25) (84 - 97)  BP: 110/66 (09-07 @ 08:25) (102/47 - 126/72)  RR: 18 (09-07 @ 08:25) (16 - 18)  SpO2: 96% (09-07 @ 08:25) (95% - 96%)  09-06-20 @ 07:01  -  09-07-20 @ 07:00  --------------------------------------------------------  IN: 580 mL / OUT: 800 mL / NET: -220 mL    09-07-20 @ 07:01  -  09-07-20 @ 15:06  --------------------------------------------------------  IN: 240 mL / OUT: 0 mL / NET: 240 mL      Physical Exam:  GEN: resting in bed comfortably in NAD  RESP: no increased WOB  ABD: soft, non-distended, non-tender without rebound tenderness or guarding  EXTR: warm, well-perfused   NEURO: AAOx4    Labs:                        7.8    7.55  )-----------( 167      ( 06 Sep 2020 06:50 )             25.2   09-06    141  |  107  |  30<H>  ----------------------------<  104<H>  4.7   |  26  |  0.81    Ca    9.8      06 Sep 2020 06:50  Phos  2.4     09-06  Mg     2.2     09-06      Medications:   MEDICATIONS  (STANDING):  atorvastatin 80 milliGRAM(s) Oral at bedtime  cholecalciferol 1000 Unit(s) Oral daily  enoxaparin Injectable 40 milliGRAM(s) SubCutaneous daily  finasteride 5 milliGRAM(s) Oral daily  lidocaine   Patch 2 Patch Transdermal daily  memantine 10 milliGRAM(s) Oral two times a day  metoprolol tartrate 25 milliGRAM(s) Oral every 12 hours  predniSONE   Tablet 1 milliGRAM(s) Oral <User Schedule>  predniSONE   Tablet 2 milliGRAM(s) Oral <User Schedule>  tamsulosin 0.4 milliGRAM(s) Oral at bedtime    MEDICATIONS  (PRN):  acetaminophen   Tablet .. 650 milliGRAM(s) Oral every 6 hours PRN Mild Pain (1 - 3)  calcium carbonate    500 mG (Tums) Chewable 1 Tablet(s) Chew every 4 hours PRN Heartburn

## 2020-09-08 NOTE — DISCHARGE NOTE PROVIDER - NSDCMRMEDTOKEN_GEN_ALL_CORE_FT
acetaminophen 325 mg oral tablet: 2 tab(s) orally every 6 hours, As needed, Mild Pain (1 - 3)  calcium carbonate 500 mg (200 mg elemental calcium) oral tablet, chewable: 1 tab(s) orally every 4 hours, As needed, Heartburn  cholecalciferol oral tablet: 1000 unit(s) orally once a day  finasteride 5 mg oral tablet: 1 tab(s) orally once a day  lidocaine 5% topical film: Apply topically to affected area once a day  memantine 10 mg oral tablet: 1 tab(s) orally 2 times a day  metoprolol succinate 25 mg oral tablet, extended release: 1 tab(s) orally once a day  predniSONE 1 mg oral tablet: 1 tab(s) orally in AM and 2 tabs orally in PM  rosuvastatin 20 mg oral tablet: 1 tab(s) orally once a day  tamsulosin 0.4 mg oral capsule: 1 cap(s) orally once a day

## 2020-09-09 NOTE — PROGRESS NOTE ADULT - PROBLEM SELECTOR PLAN 3
Agitation likely related to underlying dementia and exacerbated by acute medical condition and pain. Suspect transient delirium that is now resolved.  1. UA not suggestive of UTI. No significant metabolic derangement noted on testing.  2. Continue home dose of memantine 10 mg PO BID.  3. Pain control as above.  4. Fall/aspiration precautions.  5. Limit sedatives as able.  6. Frequent reorientation.  7. Maintain sleep-wake cycle. Consider melatonin qhs PRN insomnia.

## 2020-09-09 NOTE — PROGRESS NOTE ADULT - PROBLEM SELECTOR PROBLEM 5
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 Per the patient's daughter, he was reportedly confused and tried to leave during his dialysis sessions multiple times, which has never happened before. She states he does have some baseline dementia, but has never had this kind of confusion or behavior before. He denies recent illnesses, chest pain, shortness of breath, cough, abdominal pain, changes in bowel movements, dysuria, arthralgias, or myalgias. He has not had any complications since his surgery on 11/22.    Nephrology is consulted to manage her ESRD   Coronary artery disease

## 2020-09-09 NOTE — PROGRESS NOTE ADULT - SUBJECTIVE AND OBJECTIVE BOX
Subjective:   Patient seen at bedside this AM. Reports feeling well, without complaints. Denies chest pain, SOB. Tolerating diet without N/V. Reluctant to get OOB into chair but doesn't want his wife to nag him about getting OOB. Patient excited to go to [facility] because he is bored in the hospital.    24h Events:   - Overnight, no acute events    Objective:  Vital Signs  T(C): 36.9 (09-09 @ 09:20), Max: 36.9 (09-09 @ 09:20)  HR: 94 (09-09 @ 10:00) (78 - 110)  BP: 123/70 (09-09 @ 10:00) (98/60 - 147/70)  RR: 20 (09-09 @ 10:00) (18 - 20)  SpO2: 99% (09-09 @ 10:00) (94% - 99%)  09-08-20 @ 07:01  -  09-09-20 @ 07:00  --------------------------------------------------------  IN: 440 mL / OUT: 1300 mL / NET: -860 mL    09-09-20 @ 07:01  -  09-09-20 @ 12:27  --------------------------------------------------------  IN: 360 mL / OUT: 100 mL / NET: 260 mL      Physical Exam:  GEN: resting in bed comfortably in NAD  RESP: no increased WOB  ABD: soft, non-distended, non-tender without rebound tenderness or guarding  EXTR: warm, well-perfused without gross deformities  NEURO: AAOx4, pleasant and interactive      Medications:   MEDICATIONS  (STANDING):  atorvastatin 80 milliGRAM(s) Oral at bedtime  cholecalciferol 1000 Unit(s) Oral daily  enoxaparin Injectable 40 milliGRAM(s) SubCutaneous daily  finasteride 5 milliGRAM(s) Oral daily  lidocaine   Patch 2 Patch Transdermal daily  memantine 10 milliGRAM(s) Oral two times a day  metoprolol tartrate 25 milliGRAM(s) Oral every 12 hours  predniSONE   Tablet 1 milliGRAM(s) Oral <User Schedule>  predniSONE   Tablet 2 milliGRAM(s) Oral <User Schedule>  tamsulosin 0.4 milliGRAM(s) Oral at bedtime    MEDICATIONS  (PRN):  acetaminophen   Tablet .. 650 milliGRAM(s) Oral every 6 hours PRN Mild Pain (1 - 3)  calcium carbonate    500 mG (Tums) Chewable 1 Tablet(s) Chew every 4 hours PRN Heartburn        Assessment:   89M PMH of afib (on Eliquis), HTN, s/p CABG, PPM, and dementia found to have fractures of R hemipelvis including nondisplaced fx of R femoral head, R acetabulum, soft tissue hematomas extending into RP, multiple acute fx of ribs, including R side 1-6 and L 10-11. Clinically stable on floor with improving mental status per patient and wife both. Plan to d/c to [facility] today.      Plan:   - Multimodal pain control: IV acetaminophen, lidocaine patch  - C/w soft diet  - Activity: encourage OOB to chair, IS    - DVT ppx: lovenox 40, b/l SCDs  - No acute intervention needed per Ortho, OP f/u  - TTWB RLE  - D/c today to [facility] ([facility])        Arlene Bravo, PGY-1  ATP  [phone]

## 2020-09-09 NOTE — PROGRESS NOTE ADULT - PROVIDER SPECIALTY LIST ADULT
Internal Medicine
SICU
Surgery
Trauma Surgery
Surgery
SICU

## 2020-09-09 NOTE — PROGRESS NOTE ADULT - PROBLEM SELECTOR PLAN 5
1. Not on ASA at home and would not start now.  2. Continue lipitor.  3. Beta blocker as above.  4. EKG reviewed on admission and not consistent with an acute coronary syndrome.  5. Has significant murmur on exam, small effusions on imaging, and known history of AS. Monitor volume status.

## 2020-09-09 NOTE — DISCHARGE NOTE NURSING/CASE MANAGEMENT/SOCIAL WORK - PATIENT PORTAL LINK FT
You can access the FollowMyHealth Patient Portal offered by [facility] by registering at the following website: [email or web address]. By joining [facility] FollowMyHealth portal, you will also be able to view your health information using other applications (apps) compatible with our system.

## 2020-09-09 NOTE — PROGRESS NOTE ADULT - PROBLEM SELECTOR PLAN 9
Findings and plan of care discussed with patient's PMD, Dr. Murrieta, yesterday.    Plan is for d/c to [facility] once bed is available. Repeat COVID swab is negative.

## 2020-09-09 NOTE — PROGRESS NOTE ADULT - SUBJECTIVE AND OBJECTIVE BOX
[facility] Division of Hospital Medicine  Tin Stephenson MD  Pager (NABILA, 0U-5P): [phone]  Other Times:  [phone]    Patient is a 89y old  Male who presents with a chief complaint of AMS s/p unwitnessed fall (08 Sep 2020 13:28)    SUBJECTIVE / OVERNIGHT EVENTS: No events overnight. This morning, trying to get out of bed and into the chair. Denies pain. Denies SOB. Denies fever/chills. No nausea/vomiting. ROS limited due to advanced dementia.    MEDICATIONS  (STANDING):  atorvastatin 80 milliGRAM(s) Oral at bedtime  cholecalciferol 1000 Unit(s) Oral daily  enoxaparin Injectable 40 milliGRAM(s) SubCutaneous daily  finasteride 5 milliGRAM(s) Oral daily  lidocaine   Patch 2 Patch Transdermal daily  memantine 10 milliGRAM(s) Oral two times a day  metoprolol tartrate 25 milliGRAM(s) Oral every 12 hours  predniSONE   Tablet 1 milliGRAM(s) Oral <User Schedule>  predniSONE   Tablet 2 milliGRAM(s) Oral <User Schedule>  tamsulosin 0.4 milliGRAM(s) Oral at bedtime    MEDICATIONS  (PRN):  acetaminophen   Tablet .. 650 milliGRAM(s) Oral every 6 hours PRN Mild Pain (1 - 3)  calcium carbonate    500 mG (Tums) Chewable 1 Tablet(s) Chew every 4 hours PRN Heartburn      CAPILLARY BLOOD GLUCOSE        I&O's Summary    08 Sep 2020 07:01  -  09 Sep 2020 07:00  --------------------------------------------------------  IN: 440 mL / OUT: 1300 mL / NET: -860 mL    09 Sep 2020 07:01  -  09 Sep 2020 11:53  --------------------------------------------------------  IN: 360 mL / OUT: 100 mL / NET: 260 mL        PHYSICAL EXAM:  Vital Signs Last 24 Hrs  T(C): 36.9 (09 Sep 2020 09:20), Max: 36.9 (09 Sep 2020 09:20)  T(F): 98.5 (09 Sep 2020 09:20), Max: 98.5 (09 Sep 2020 09:20)  HR: 110 (09 Sep 2020 09:20) (78 - 110)  BP: 138/56 (09 Sep 2020 09:20) (98/60 - 147/70)  BP(mean): --  RR: 18 (09 Sep 2020 09:20) (18 - 18)  SpO2: 96% (09 Sep 2020 09:20) (94% - 97%)  GENERAL: Frail/cachectic appearing man in NAD, somewhat anxious  EYES: EOMI, pupils constricted b/l and equal, conjunctiva and sclera clear  ENMT: Moist mucous membranes. Poor dentition.  RESPIRATORY: Clear to auscultation bilaterally anteriorly; No rales, rhonchi, wheezing, or rubs  CARDIOVASCULAR: Irregularly irregular; + crescendo-decrescendo systolic murmur, loudest in aortic area. No rubs or gallops  GASTROINTESTINAL: Soft, Nontender, Nondistended; Bowel sounds present  EXTREMITIES:  2+ Peripheral Pulses, No clubbing, cyanosis, or edema  MSK: No tenderness to palpation over lateral or anterior R chest wall. No tenderness to palpation over R hip and no obvious swelling noted.  NERVOUS SYSTEM:  Alert & Oriented to person, location "hospital", and year "20"; Moving all 4 extremities; No gross sensory deficits.     LABS:    PT/INR - ( 08 Sep 2020 08:44 )   PT: 13.0 sec;   INR: 1.11 ratio      RADIOLOGY & ADDITIONAL TESTS:  Results Reviewed: No new studies  Imaging Personally Reviewed: No new studies  Electrocardiogram Personally Reviewed: No new studies    COORDINATION OF CARE:  Care Discussed with Consultants/Other Providers [Y]: Dr. Junior/JONATHAN Sotelo regarding discharge planning

## 2020-09-09 NOTE — PROGRESS NOTE ADULT - REASON FOR ADMISSION
AMS s/p unwitnessed fall

## 2020-09-09 NOTE — CHART NOTE - NSCHARTNOTEFT_GEN_A_CORE
Patient accepted by me after suffering from mechanical fall. Patient with right iliac fracture with retroperitoneal hematoma. Repeat CBC with T&S. Possible self-tamponaded, but may require more aggressive interventions if hemoglobin is dropping.     Plan discussed with MAR. No

## 2020-09-09 NOTE — DISCHARGE NOTE NURSING/CASE MANAGEMENT/SOCIAL WORK - NSDCFUADDAPPT_GEN_ALL_CORE_FT
Please follow up with your Trauma Doctor, Dr Junior, only if needed at [address] [place] [address] [address] [address] , phone #[phone].    Dr. Murrieta, Your PMD--Please call for a follow up appointment in the next 1-2 weeks regarding your recent injury and hospitalization.  Please discuss when to resume your Eliquis.     You may use Salanpas 1% lidocaine patches over the counter for topical pain relief.

## 2020-12-11 PROBLEM — L97.409 HEEL ULCER: Status: ACTIVE | Noted: 2020-01-01

## 2020-12-28 NOTE — ED PROVIDER NOTE - NSFOLLOWUPINSTRUCTIONS_ED_ALL_ED_FT
-You were seen for a fall.  -Follow up with your primary care provider and/or cardiologist in 24-48 hours.   -A copy of resulted labs, imaging, and findings have been provided to you.   -As we discussed, please return to the Emergency Department if you experience any new/worsening symptoms, including, but are not limited to: unrelenting nausea, vomiting, fever, chills, chest pain, shortness of breath, dizziness, worsening abdominal pain, worsening back pain, syncope, blood in stool, headache that does not resolve, numbness or tingling, loss of sensation, loss of motor function, or any other concerning symptoms.  -If you have issues obtaining follow up, please call: [phone] to obtain a doctor or specialist who takes your insurance in your area.  You may call [phone] to make an appointment with the internal medicine clinic.

## 2020-12-28 NOTE — ED ADULT NURSE NOTE - CAS ED AMA FORM SIGNED YN
AMA form signed by wife at the bedside on pts behalf with MD and resident at the bedside to verbalize the risks vs. benefits associated with signing out AMA. with understanding verbalized in return by the wife./Yes

## 2020-12-28 NOTE — ED PROVIDER NOTE - OBJECTIVE STATEMENT
Shobha Leon MD: 89yo M with PMH of Afib not on Eliquis, s/p PPM insertion, CAD s/p CABG, dementia (baseline AAOx1) who presents with fall. As per EMS, aide witnessed patient ambulating to bathroom when he became unsteady and was falling to the ground when aide assisted him to the floor. No head trauma or LOC reported. Pt is not on Eliquis anymore since earlier this year. Pt is AAOx1 and has no recollection of event. Pt arrives in C-collar.

## 2020-12-28 NOTE — ED PROVIDER NOTE - PHYSICAL EXAMINATION
CONSTITUTIONAL: elderly male  HEAD: Normocephalic; atraumatic  EYES: Normal inspection, EOMI  ENMT: External appears normal; normal oropharynx  NECK: non-tender; C-collar in place   CARD: irregular; no audible murmurs, rubs, or gallops  RESP: No respiratory distress, lungs ctab/l  ABD: Soft, non-distended; non-tender; no rebound or guarding  EXT: No LE pitting edema or calf tenderness; distal pulses intact with good capillary refill  SKIN: Warm, dry, L heel ulcer   NEURO: aaox1, moving all extremities spontaneously

## 2020-12-28 NOTE — ED ADULT NURSE REASSESSMENT NOTE - NS ED NURSE REASSESS COMMENT FT1
wife denies to have post discharge/AMA VS completed. AMA paperwork signed by pts wife and by MD ED attending Daniela and in pts chart. safety and fall precautions maintained at all times. Senior care to transport pt back home. wife remains at the bedside.

## 2020-12-28 NOTE — ED ADULT NURSE NOTE - OBJECTIVE STATEMENT
89 y/o Male PMH Afib- not currently on Eliquis or any blood thinners, pacemaker, CAD s/p CABG, dementia- AAOx1 at baseline presenting to ED by EMS from home c/o witnessed fall by aide whom was assisting pt to the bathroom with c-collar in place upon ED arrival. as per aide at the bedside who witnessed fall pt became unsteady and she slowly assisted him down to the floor, no head injury or loc. no obvious deformities or injuries noted upon pt arrival to ED. pt denies any current pain or discomfort upon assessment and verbalizes not recalling what happened or why he is here in the ED. EMS denies any recent fever, respiratory s/s, cough, known sick contacts or recent travel. safety and fall precautions maintained. call bell at the bedside and within reach. VS stable. c-collar remains in place, pt pending labs results and ct scan.

## 2020-12-28 NOTE — ED PROVIDER NOTE - CLINICAL SUMMARY MEDICAL DECISION MAKING FREE TEXT BOX
Shobha Leon MD: 89yo M with PMH of Afib not on Eliquis, s/p PPM insertion, CAD s/p CABG, dementia (baseline AAOx1) who presents with fall.

## 2020-12-28 NOTE — ED PROVIDER NOTE - PATIENT PORTAL LINK FT
You can access the FollowMyHealth Patient Portal offered by [facility] by registering at the following website: [email or web address]. By joining [facility]’s FollowMyHealth portal, you will also be able to view your health information using other applications (apps) compatible with our system.

## 2020-12-28 NOTE — ED PROVIDER NOTE - ATTENDING CONTRIBUTION TO CARE
89y M hx of dementia, Afib, 89y M hx of dementia A&Ox1, ambulatory with waker and assist, Afib not on AC, CAD/CABG, BIBEMS sp fall to ground. Per EMS pt ambulating with aide to bathroom and lost balance, slid to ground. Aide reported no head trauma or LOC, however, pt unable to give any hx 2/2 dementia and impaired cognition at baseline. For this reason was collared by EMS. Pt cannot reliably participate with PE either. There are no obvious injuries, ie abrasions, ecchymoses, hematomas, lacerations. No spinal step offs. Moves all extremities spontaneously. Has pressure injuries to midline T spine, upper back, sacrum, BL heels, L heel has a pressure ulcer. Pt is 97F rectally on arrival. No family present, unable to obtain further hx at present. Will eval for traumatic injuries 2/2 fall, though seems like low impact mechanism, will also obtain labs, UA, EKG, as possibly infxn, electrolyte abn, some other contributor to pt fall, specifically infxn in context of hypothermia.

## 2020-12-28 NOTE — ED ADULT NURSE NOTE - EXPLANATION OF PATIENT'S REASON FOR LEAVING
wife at bedside verbalized that pt did not need to be admitted in the hospital nor get extensive tests done as he does not have any injuries and was assisted to ground when losing balance, and is more safe at home.

## 2020-12-28 NOTE — ED PROVIDER NOTE - PROGRESS NOTE DETAILS
Shobha Leon MD: Wife is at bedside requesting pt to be returned home. She removed the C-collar herself. On reassessment, pt was able to have full range of motion of neck. He was unable to go for CT. Pt is awake and alert. Trop elevated 98, EKG shows STDs in V3-V6 that appears to be unchanged from previous. Pt is not complaining of any chest pain. Lactate 3.9. The patient's wife at bedside is requesting to leave the Emergency Department despite the recommendation of admission to the hospital and/or further testing in the Emergency Department. It has been explained to the patient that leaving prior to completion of work up and treatment may result in recurrent or worsening of symptoms, severe permanent disability, pain and suffering, and/or death. The risks, benefits and treatment alternatives  pertaining to the patient's specific medical issue were discussed. The patient's wife has demonstrated comprehension and verbalizes understanding of these risks.  The patient's wife demonstrates fluent and appropriate speech and coherent thought process. All lab and test results available at the time were communicated to the patient. The patient's wife has been made aware that patient is welcome to return to the Emergency Department for continued care at any time. Follow up with primary care doctor was recommended as soon as possible. The patient's wife has been given the opportunity to ask questions about their medical condition and had all their questions answered. Shobha Leon MD: Wife is at bedside requesting pt to be returned home. She removed the C-collar herself. On reassessment, pt was able to have full range of motion of neck. He has not yet gone for CT and wife does not want CT. Pt is awake and alert. Trop elevated 98, EKG shows STDs in V3-V6 that appears to be unchanged from previous. Pt is not complaining of any chest pain. Lactate 3.9. The patient's wife at bedside is requesting to leave the Emergency Department despite the recommendation of admission to the hospital and/or further testing in the Emergency Department, ie repeat trop and lactate. It has been explained to the patients wife that leaving prior to completion of work up and treatment may result in recurrent or worsening of symptoms, severe permanent disability, pain and suffering, and/or death. The risks, benefits and treatment alternatives  pertaining to the patient's specific medical issue were discussed. The patient's wife has demonstrated comprehension and verbalizes understanding of these risks.  The patient's wife demonstrates fluent and appropriate speech and coherent thought process. All lab and test results available at the time were communicated to the patient. The patient's wife has been made aware that patient is welcome to return to the Emergency Department for continued care at any time. Follow up with primary care doctor was recommended as soon as possible. The patient's wife has been given the opportunity to ask questions about their medical condition and had all their questions answered.

## 2021-01-01 ENCOUNTER — TRANSCRIPTION ENCOUNTER (OUTPATIENT)
Age: 86
End: 2021-01-01

## 2021-01-01 ENCOUNTER — INPATIENT (INPATIENT)
Facility: HOSPITAL | Age: 86
LOS: 6 days | DRG: 951 | End: 2021-03-25
Attending: INTERNAL MEDICINE | Admitting: INTERNAL MEDICINE
Payer: OTHER MISCELLANEOUS

## 2021-01-01 ENCOUNTER — INPATIENT (INPATIENT)
Facility: HOSPITAL | Age: 86
LOS: 7 days | Discharge: HOME | DRG: 64 | End: 2021-03-18
Attending: HOSPITALIST | Admitting: HOSPITALIST
Payer: MEDICARE

## 2021-01-01 ENCOUNTER — NON-APPOINTMENT (OUTPATIENT)
Age: 86
End: 2021-01-01

## 2021-01-01 VITALS
HEART RATE: 104 BPM | DIASTOLIC BLOOD PRESSURE: 89 MMHG | OXYGEN SATURATION: 98 % | HEIGHT: 72 IN | SYSTOLIC BLOOD PRESSURE: 151 MMHG | RESPIRATION RATE: 18 BRPM | WEIGHT: 139.99 LBS | TEMPERATURE: 97 F

## 2021-01-01 VITALS
SYSTOLIC BLOOD PRESSURE: 138 MMHG | WEIGHT: 147.71 LBS | TEMPERATURE: 99 F | DIASTOLIC BLOOD PRESSURE: 80 MMHG | HEART RATE: 79 BPM | OXYGEN SATURATION: 99 % | RESPIRATION RATE: 18 BRPM | HEIGHT: 71.65 IN

## 2021-01-01 VITALS
SYSTOLIC BLOOD PRESSURE: 90 MMHG | HEART RATE: 107 BPM | DIASTOLIC BLOOD PRESSURE: 60 MMHG | RESPIRATION RATE: 24 BRPM | OXYGEN SATURATION: 85 % | TEMPERATURE: 99 F

## 2021-01-01 VITALS
HEART RATE: 96 BPM | RESPIRATION RATE: 22 BRPM | DIASTOLIC BLOOD PRESSURE: 74 MMHG | TEMPERATURE: 98 F | OXYGEN SATURATION: 97 % | SYSTOLIC BLOOD PRESSURE: 138 MMHG

## 2021-01-01 DIAGNOSIS — I48.91 UNSPECIFIED ATRIAL FIBRILLATION: ICD-10-CM

## 2021-01-01 DIAGNOSIS — E78.5 HYPERLIPIDEMIA, UNSPECIFIED: ICD-10-CM

## 2021-01-01 DIAGNOSIS — R09.89 OTHER SPECIFIED SYMPTOMS AND SIGNS INVOLVING THE CIRCULATORY AND RESPIRATORY SYSTEMS: ICD-10-CM

## 2021-01-01 DIAGNOSIS — Z71.89 OTHER SPECIFIED COUNSELING: ICD-10-CM

## 2021-01-01 DIAGNOSIS — N40.0 BENIGN PROSTATIC HYPERPLASIA WITHOUT LOWER URINARY TRACT SYMPTOMS: ICD-10-CM

## 2021-01-01 DIAGNOSIS — R45.1 RESTLESSNESS AND AGITATION: ICD-10-CM

## 2021-01-01 DIAGNOSIS — R53.2 FUNCTIONAL QUADRIPLEGIA: ICD-10-CM

## 2021-01-01 DIAGNOSIS — Z95.1 PRESENCE OF AORTOCORONARY BYPASS GRAFT: Chronic | ICD-10-CM

## 2021-01-01 DIAGNOSIS — Z51.5 ENCOUNTER FOR PALLIATIVE CARE: ICD-10-CM

## 2021-01-01 DIAGNOSIS — R06.00 DYSPNEA, UNSPECIFIED: ICD-10-CM

## 2021-01-01 DIAGNOSIS — L89.90 PRESSURE ULCER OF UNSPECIFIED SITE, UNSPECIFIED STAGE: ICD-10-CM

## 2021-01-01 DIAGNOSIS — F03.90 UNSPECIFIED DEMENTIA WITHOUT BEHAVIORAL DISTURBANCE: ICD-10-CM

## 2021-01-01 DIAGNOSIS — Z87.39 PERSONAL HISTORY OF OTHER DISEASES OF THE MUSCULOSKELETAL SYSTEM AND CONNECTIVE TISSUE: ICD-10-CM

## 2021-01-01 DIAGNOSIS — Z95.0 PRESENCE OF CARDIAC PACEMAKER: Chronic | ICD-10-CM

## 2021-01-01 DIAGNOSIS — R41.0 DISORIENTATION, UNSPECIFIED: ICD-10-CM

## 2021-01-01 DIAGNOSIS — I63.9 CEREBRAL INFARCTION, UNSPECIFIED: ICD-10-CM

## 2021-01-01 DIAGNOSIS — R52 PAIN, UNSPECIFIED: ICD-10-CM

## 2021-01-01 DIAGNOSIS — Z29.9 ENCOUNTER FOR PROPHYLACTIC MEASURES, UNSPECIFIED: ICD-10-CM

## 2021-01-01 DIAGNOSIS — R13.10 DYSPHAGIA, UNSPECIFIED: ICD-10-CM

## 2021-01-01 DIAGNOSIS — K59.00 CONSTIPATION, UNSPECIFIED: ICD-10-CM

## 2021-01-01 DIAGNOSIS — I63.412 CEREBRAL INFARCTION DUE TO EMBOLISM OF LEFT MIDDLE CEREBRAL ARTERY: ICD-10-CM

## 2021-01-01 DIAGNOSIS — N39.0 URINARY TRACT INFECTION, SITE NOT SPECIFIED: ICD-10-CM

## 2021-01-01 DIAGNOSIS — I63.512 CEREBRAL INFARCTION DUE TO UNSPECIFIED OCCLUSION OR STENOSIS OF LEFT MIDDLE CEREBRAL ARTERY: ICD-10-CM

## 2021-01-01 LAB
-  AMIKACIN: SIGNIFICANT CHANGE UP
-  AMIKACIN: SIGNIFICANT CHANGE UP
-  AMOXICILLIN/CLAVULANIC ACID: SIGNIFICANT CHANGE UP
-  AMOXICILLIN/CLAVULANIC ACID: SIGNIFICANT CHANGE UP
-  AMPICILLIN/SULBACTAM: SIGNIFICANT CHANGE UP
-  AMPICILLIN/SULBACTAM: SIGNIFICANT CHANGE UP
-  AMPICILLIN: SIGNIFICANT CHANGE UP
-  AMPICILLIN: SIGNIFICANT CHANGE UP
-  AZTREONAM: SIGNIFICANT CHANGE UP
-  AZTREONAM: SIGNIFICANT CHANGE UP
-  CEFAZOLIN: SIGNIFICANT CHANGE UP
-  CEFAZOLIN: SIGNIFICANT CHANGE UP
-  CEFEPIME: SIGNIFICANT CHANGE UP
-  CEFEPIME: SIGNIFICANT CHANGE UP
-  CEFOTAXIME: SIGNIFICANT CHANGE UP
-  CEFOXITIN: SIGNIFICANT CHANGE UP
-  CEFOXITIN: SIGNIFICANT CHANGE UP
-  CEFTAZIDIME: SIGNIFICANT CHANGE UP
-  CEFTRIAXONE: SIGNIFICANT CHANGE UP
-  CEFTRIAXONE: SIGNIFICANT CHANGE UP
-  CEFUROXIME: SIGNIFICANT CHANGE UP
-  CIPROFLOXACIN: SIGNIFICANT CHANGE UP
-  CIPROFLOXACIN: SIGNIFICANT CHANGE UP
-  ERTAPENEM: SIGNIFICANT CHANGE UP
-  ERTAPENEM: SIGNIFICANT CHANGE UP
-  GENTAMICIN: SIGNIFICANT CHANGE UP
-  GENTAMICIN: SIGNIFICANT CHANGE UP
-  IMIPENEM: SIGNIFICANT CHANGE UP
-  IMIPENEM: SIGNIFICANT CHANGE UP
-  LEVOFLOXACIN: SIGNIFICANT CHANGE UP
-  LEVOFLOXACIN: SIGNIFICANT CHANGE UP
-  MEROPENEM: SIGNIFICANT CHANGE UP
-  MEROPENEM: SIGNIFICANT CHANGE UP
-  MINOCYCLINE: SIGNIFICANT CHANGE UP
-  NITROFURANTOIN: SIGNIFICANT CHANGE UP
-  NITROFURANTOIN: SIGNIFICANT CHANGE UP
-  PIPERACILLIN/TAZOBACTAM: SIGNIFICANT CHANGE UP
-  PIPERACILLIN/TAZOBACTAM: SIGNIFICANT CHANGE UP
-  TIGECYCLINE: SIGNIFICANT CHANGE UP
-  TIGECYCLINE: SIGNIFICANT CHANGE UP
-  TOBRAMYCIN: SIGNIFICANT CHANGE UP
-  TOBRAMYCIN: SIGNIFICANT CHANGE UP
-  TRIMETHOPRIM/SULFAMETHOXAZOLE: SIGNIFICANT CHANGE UP
-  TRIMETHOPRIM/SULFAMETHOXAZOLE: SIGNIFICANT CHANGE UP
ALBUMIN SERPL ELPH-MCNC: 3.4 G/DL — SIGNIFICANT CHANGE UP (ref 3.3–5)
ALP SERPL-CCNC: 154 U/L — HIGH (ref 40–120)
ALT FLD-CCNC: 34 U/L — SIGNIFICANT CHANGE UP (ref 10–45)
ANION GAP SERPL CALC-SCNC: 12 MMOL/L — SIGNIFICANT CHANGE UP (ref 5–17)
ANION GAP SERPL CALC-SCNC: 12 MMOL/L — SIGNIFICANT CHANGE UP (ref 5–17)
ANION GAP SERPL CALC-SCNC: 13 MMOL/L — SIGNIFICANT CHANGE UP (ref 5–17)
APPEARANCE UR: ABNORMAL
APTT BLD: 25.7 SEC — LOW (ref 27.5–35.5)
AST SERPL-CCNC: 55 U/L — HIGH (ref 10–40)
BASE EXCESS BLDV CALC-SCNC: 4.4 MMOL/L — HIGH (ref -2–2)
BASOPHILS # BLD AUTO: 0.03 K/UL — SIGNIFICANT CHANGE UP (ref 0–0.2)
BASOPHILS NFR BLD AUTO: 0.3 % — SIGNIFICANT CHANGE UP (ref 0–2)
BILIRUB SERPL-MCNC: 0.3 MG/DL — SIGNIFICANT CHANGE UP (ref 0.2–1.2)
BILIRUB UR-MCNC: NEGATIVE — SIGNIFICANT CHANGE UP
BUN SERPL-MCNC: 22 MG/DL — SIGNIFICANT CHANGE UP (ref 7–23)
BUN SERPL-MCNC: 23 MG/DL — SIGNIFICANT CHANGE UP (ref 7–23)
BUN SERPL-MCNC: 30 MG/DL — HIGH (ref 7–23)
CALCIUM SERPL-MCNC: 10.4 MG/DL — SIGNIFICANT CHANGE UP (ref 8.4–10.5)
CALCIUM SERPL-MCNC: 10.6 MG/DL — HIGH (ref 8.4–10.5)
CALCIUM SERPL-MCNC: 10.9 MG/DL — HIGH (ref 8.4–10.5)
CHLORIDE SERPL-SCNC: 103 MMOL/L — SIGNIFICANT CHANGE UP (ref 96–108)
CHLORIDE SERPL-SCNC: 103 MMOL/L — SIGNIFICANT CHANGE UP (ref 96–108)
CHLORIDE SERPL-SCNC: 104 MMOL/L — SIGNIFICANT CHANGE UP (ref 96–108)
CO2 BLDV-SCNC: 32 MMOL/L — HIGH (ref 22–30)
CO2 SERPL-SCNC: 22 MMOL/L — SIGNIFICANT CHANGE UP (ref 22–31)
CO2 SERPL-SCNC: 23 MMOL/L — SIGNIFICANT CHANGE UP (ref 22–31)
CO2 SERPL-SCNC: 24 MMOL/L — SIGNIFICANT CHANGE UP (ref 22–31)
COLOR SPEC: YELLOW — SIGNIFICANT CHANGE UP
CREAT SERPL-MCNC: 0.92 MG/DL — SIGNIFICANT CHANGE UP (ref 0.5–1.3)
CREAT SERPL-MCNC: 1.07 MG/DL — SIGNIFICANT CHANGE UP (ref 0.5–1.3)
CREAT SERPL-MCNC: 1.12 MG/DL — SIGNIFICANT CHANGE UP (ref 0.5–1.3)
CRP SERPL-MCNC: 4.32 MG/DL — HIGH (ref 0–0.4)
CULTURE RESULTS: SIGNIFICANT CHANGE UP
DIFF PNL FLD: ABNORMAL
EOSINOPHIL # BLD AUTO: 0.11 K/UL — SIGNIFICANT CHANGE UP (ref 0–0.5)
EOSINOPHIL NFR BLD AUTO: 1 % — SIGNIFICANT CHANGE UP (ref 0–6)
ERYTHROCYTE [SEDIMENTATION RATE] IN BLOOD: 62 MM/HR — HIGH (ref 0–20)
GAS PNL BLDV: SIGNIFICANT CHANGE UP
GLUCOSE SERPL-MCNC: 87 MG/DL — SIGNIFICANT CHANGE UP (ref 70–99)
GLUCOSE SERPL-MCNC: 94 MG/DL — SIGNIFICANT CHANGE UP (ref 70–99)
GLUCOSE SERPL-MCNC: 96 MG/DL — SIGNIFICANT CHANGE UP (ref 70–99)
GLUCOSE UR QL: NEGATIVE — SIGNIFICANT CHANGE UP
HCO3 BLDV-SCNC: 30 MMOL/L — HIGH (ref 21–29)
HCT VFR BLD CALC: 31.4 % — LOW (ref 39–50)
HCT VFR BLD CALC: 31.7 % — LOW (ref 39–50)
HGB BLD-MCNC: 10 G/DL — LOW (ref 13–17)
HGB BLD-MCNC: 9.5 G/DL — LOW (ref 13–17)
IMM GRANULOCYTES NFR BLD AUTO: 0.7 % — SIGNIFICANT CHANGE UP (ref 0–1.5)
INR BLD: 0.98 RATIO — SIGNIFICANT CHANGE UP (ref 0.88–1.16)
KETONES UR-MCNC: NEGATIVE — SIGNIFICANT CHANGE UP
LEUKOCYTE ESTERASE UR-ACNC: ABNORMAL
LYMPHOCYTES # BLD AUTO: 19.4 % — SIGNIFICANT CHANGE UP (ref 13–44)
LYMPHOCYTES # BLD AUTO: 2.17 K/UL — SIGNIFICANT CHANGE UP (ref 1–3.3)
MCHC RBC-ENTMCNC: 30.3 GM/DL — LOW (ref 32–36)
MCHC RBC-ENTMCNC: 31.3 PG — SIGNIFICANT CHANGE UP (ref 27–34)
MCHC RBC-ENTMCNC: 31.5 GM/DL — LOW (ref 32–36)
MCHC RBC-ENTMCNC: 31.9 PG — SIGNIFICANT CHANGE UP (ref 27–34)
MCV RBC AUTO: 101.3 FL — HIGH (ref 80–100)
MCV RBC AUTO: 103.3 FL — HIGH (ref 80–100)
METHOD TYPE: SIGNIFICANT CHANGE UP
METHOD TYPE: SIGNIFICANT CHANGE UP
MONOCYTES # BLD AUTO: 1.27 K/UL — HIGH (ref 0–0.9)
MONOCYTES NFR BLD AUTO: 11.4 % — SIGNIFICANT CHANGE UP (ref 2–14)
NEUTROPHILS # BLD AUTO: 7.5 K/UL — HIGH (ref 1.8–7.4)
NEUTROPHILS NFR BLD AUTO: 67.2 % — SIGNIFICANT CHANGE UP (ref 43–77)
NITRITE UR-MCNC: NEGATIVE — SIGNIFICANT CHANGE UP
NRBC # BLD: 0 /100 WBCS — SIGNIFICANT CHANGE UP (ref 0–0)
NRBC # BLD: 0 /100 WBCS — SIGNIFICANT CHANGE UP (ref 0–0)
ORGANISM # SPEC MICROSCOPIC CNT: SIGNIFICANT CHANGE UP
PCO2 BLDV: 56 MMHG — HIGH (ref 35–50)
PH BLDV: 7.36 — SIGNIFICANT CHANGE UP (ref 7.35–7.45)
PH UR: 7 — SIGNIFICANT CHANGE UP (ref 5–8)
PLATELET # BLD AUTO: 201 K/UL — SIGNIFICANT CHANGE UP (ref 150–400)
PLATELET # BLD AUTO: 205 K/UL — SIGNIFICANT CHANGE UP (ref 150–400)
PO2 BLDV: 27 MMHG — SIGNIFICANT CHANGE UP (ref 25–45)
POTASSIUM SERPL-MCNC: 3.6 MMOL/L — SIGNIFICANT CHANGE UP (ref 3.5–5.3)
POTASSIUM SERPL-MCNC: 4.2 MMOL/L — SIGNIFICANT CHANGE UP (ref 3.5–5.3)
POTASSIUM SERPL-MCNC: 5.4 MMOL/L — HIGH (ref 3.5–5.3)
POTASSIUM SERPL-SCNC: 3.6 MMOL/L — SIGNIFICANT CHANGE UP (ref 3.5–5.3)
POTASSIUM SERPL-SCNC: 4.2 MMOL/L — SIGNIFICANT CHANGE UP (ref 3.5–5.3)
POTASSIUM SERPL-SCNC: 5.4 MMOL/L — HIGH (ref 3.5–5.3)
PROT SERPL-MCNC: 7.5 G/DL — SIGNIFICANT CHANGE UP (ref 6–8.3)
PROT UR-MCNC: ABNORMAL
PROTHROM AB SERPL-ACNC: 11.8 SEC — SIGNIFICANT CHANGE UP (ref 10.6–13.6)
RBC # BLD: 3.04 M/UL — LOW (ref 4.2–5.8)
RBC # BLD: 3.13 M/UL — LOW (ref 4.2–5.8)
RBC # FLD: 15.2 % — HIGH (ref 10.3–14.5)
RBC # FLD: 15.4 % — HIGH (ref 10.3–14.5)
SAO2 % BLDV: 43 % — LOW (ref 67–88)
SARS-COV-2 IGG SERPL QL IA: NEGATIVE — SIGNIFICANT CHANGE UP
SARS-COV-2 IGM SERPL IA-ACNC: 0.07 INDEX — SIGNIFICANT CHANGE UP
SARS-COV-2 RNA SPEC QL NAA+PROBE: SIGNIFICANT CHANGE UP
SARS-COV-2 RNA SPEC QL NAA+PROBE: SIGNIFICANT CHANGE UP
SODIUM SERPL-SCNC: 137 MMOL/L — SIGNIFICANT CHANGE UP (ref 135–145)
SODIUM SERPL-SCNC: 139 MMOL/L — SIGNIFICANT CHANGE UP (ref 135–145)
SODIUM SERPL-SCNC: 140 MMOL/L — SIGNIFICANT CHANGE UP (ref 135–145)
SP GR SPEC: 1.03 — HIGH (ref 1.01–1.02)
SPECIMEN SOURCE: SIGNIFICANT CHANGE UP
TROPONIN T, HIGH SENSITIVITY RESULT: 101 NG/L — HIGH (ref 0–51)
UROBILINOGEN FLD QL: NEGATIVE — SIGNIFICANT CHANGE UP
WBC # BLD: 11.16 K/UL — HIGH (ref 3.8–10.5)
WBC # BLD: 9.66 K/UL — SIGNIFICANT CHANGE UP (ref 3.8–10.5)
WBC # FLD AUTO: 11.16 K/UL — HIGH (ref 3.8–10.5)
WBC # FLD AUTO: 9.66 K/UL — SIGNIFICANT CHANGE UP (ref 3.8–10.5)

## 2021-01-01 PROCEDURE — 99233 SBSQ HOSP IP/OBS HIGH 50: CPT

## 2021-01-01 PROCEDURE — 85652 RBC SED RATE AUTOMATED: CPT

## 2021-01-01 PROCEDURE — 99232 SBSQ HOSP IP/OBS MODERATE 35: CPT

## 2021-01-01 PROCEDURE — 70496 CT ANGIOGRAPHY HEAD: CPT

## 2021-01-01 PROCEDURE — U0003: CPT

## 2021-01-01 PROCEDURE — 99233 SBSQ HOSP IP/OBS HIGH 50: CPT | Mod: GC

## 2021-01-01 PROCEDURE — 97166 OT EVAL MOD COMPLEX 45 MIN: CPT

## 2021-01-01 PROCEDURE — 87186 SC STD MICRODIL/AGAR DIL: CPT

## 2021-01-01 PROCEDURE — 80048 BASIC METABOLIC PNL TOTAL CA: CPT

## 2021-01-01 PROCEDURE — 70450 CT HEAD/BRAIN W/O DYE: CPT | Mod: 26

## 2021-01-01 PROCEDURE — 92526 ORAL FUNCTION THERAPY: CPT

## 2021-01-01 PROCEDURE — 82962 GLUCOSE BLOOD TEST: CPT

## 2021-01-01 PROCEDURE — 80053 COMPREHEN METABOLIC PANEL: CPT

## 2021-01-01 PROCEDURE — 87086 URINE CULTURE/COLONY COUNT: CPT

## 2021-01-01 PROCEDURE — U0005: CPT

## 2021-01-01 PROCEDURE — 93010 ELECTROCARDIOGRAM REPORT: CPT

## 2021-01-01 PROCEDURE — 99291 CRITICAL CARE FIRST HOUR: CPT | Mod: CS,GC

## 2021-01-01 PROCEDURE — 84484 ASSAY OF TROPONIN QUANT: CPT

## 2021-01-01 PROCEDURE — 70496 CT ANGIOGRAPHY HEAD: CPT | Mod: 26,MA

## 2021-01-01 PROCEDURE — 99222 1ST HOSP IP/OBS MODERATE 55: CPT

## 2021-01-01 PROCEDURE — 97110 THERAPEUTIC EXERCISES: CPT

## 2021-01-01 PROCEDURE — 0042T: CPT

## 2021-01-01 PROCEDURE — 85025 COMPLETE CBC W/AUTO DIFF WBC: CPT

## 2021-01-01 PROCEDURE — 70498 CT ANGIOGRAPHY NECK: CPT | Mod: 26,MA

## 2021-01-01 PROCEDURE — 99223 1ST HOSP IP/OBS HIGH 75: CPT

## 2021-01-01 PROCEDURE — 99497 ADVNCD CARE PLAN 30 MIN: CPT | Mod: 25

## 2021-01-01 PROCEDURE — 73630 X-RAY EXAM OF FOOT: CPT | Mod: 26,50

## 2021-01-01 PROCEDURE — 85027 COMPLETE CBC AUTOMATED: CPT

## 2021-01-01 PROCEDURE — 92610 EVALUATE SWALLOWING FUNCTION: CPT

## 2021-01-01 PROCEDURE — 86140 C-REACTIVE PROTEIN: CPT

## 2021-01-01 PROCEDURE — 99285 EMERGENCY DEPT VISIT HI MDM: CPT | Mod: 25

## 2021-01-01 PROCEDURE — 86769 SARS-COV-2 COVID-19 ANTIBODY: CPT

## 2021-01-01 PROCEDURE — 73630 X-RAY EXAM OF FOOT: CPT

## 2021-01-01 PROCEDURE — 70450 CT HEAD/BRAIN W/O DYE: CPT

## 2021-01-01 PROCEDURE — 85730 THROMBOPLASTIN TIME PARTIAL: CPT

## 2021-01-01 PROCEDURE — 99291 CRITICAL CARE FIRST HOUR: CPT | Mod: 25

## 2021-01-01 PROCEDURE — 85610 PROTHROMBIN TIME: CPT

## 2021-01-01 PROCEDURE — 81001 URINALYSIS AUTO W/SCOPE: CPT

## 2021-01-01 PROCEDURE — 70498 CT ANGIOGRAPHY NECK: CPT

## 2021-01-01 PROCEDURE — 82803 BLOOD GASES ANY COMBINATION: CPT

## 2021-01-01 RX ORDER — ROSUVASTATIN CALCIUM 5 MG/1
1 TABLET ORAL
Qty: 0 | Refills: 0 | DISCHARGE

## 2021-01-01 RX ORDER — MORPHINE SULFATE 50 MG/1
1 CAPSULE, EXTENDED RELEASE ORAL
Refills: 0 | Status: DISCONTINUED | OUTPATIENT
Start: 2021-01-01 | End: 2021-01-01

## 2021-01-01 RX ORDER — SODIUM CHLORIDE 9 MG/ML
1000 INJECTION, SOLUTION INTRAVENOUS
Refills: 0 | Status: DISCONTINUED | OUTPATIENT
Start: 2021-01-01 | End: 2021-01-01

## 2021-01-01 RX ORDER — ROBINUL 0.2 MG/ML
0.4 INJECTION INTRAMUSCULAR; INTRAVENOUS EVERY 4 HOURS
Refills: 0 | Status: DISCONTINUED | OUTPATIENT
Start: 2021-01-01 | End: 2021-01-01

## 2021-01-01 RX ORDER — ACETAMINOPHEN 500 MG
650 TABLET ORAL EVERY 6 HOURS
Refills: 0 | Status: DISCONTINUED | OUTPATIENT
Start: 2021-01-01 | End: 2021-01-01

## 2021-01-01 RX ORDER — COLLAGENASE CLOSTRIDIUM HIST. 250 UNIT/G
1 OINTMENT (GRAM) TOPICAL
Qty: 0 | Refills: 0 | DISCHARGE
Start: 2021-01-01

## 2021-01-01 RX ORDER — ENOXAPARIN SODIUM 100 MG/ML
40 INJECTION SUBCUTANEOUS DAILY
Refills: 0 | Status: DISCONTINUED | OUTPATIENT
Start: 2021-01-01 | End: 2021-01-01

## 2021-01-01 RX ORDER — ROBINUL 0.2 MG/ML
0.4 INJECTION INTRAMUSCULAR; INTRAVENOUS EVERY 6 HOURS
Refills: 0 | Status: DISCONTINUED | OUTPATIENT
Start: 2021-01-01 | End: 2021-01-01

## 2021-01-01 RX ORDER — METOPROLOL TARTRATE 50 MG
5 TABLET ORAL EVERY 6 HOURS
Refills: 0 | Status: DISCONTINUED | OUTPATIENT
Start: 2021-01-01 | End: 2021-01-01

## 2021-01-01 RX ORDER — SODIUM CHLORIDE 9 MG/ML
1000 INJECTION INTRAMUSCULAR; INTRAVENOUS; SUBCUTANEOUS
Refills: 0 | Status: DISCONTINUED | OUTPATIENT
Start: 2021-01-01 | End: 2021-01-01

## 2021-01-01 RX ORDER — CEFTRIAXONE 500 MG/1
1000 INJECTION, POWDER, FOR SOLUTION INTRAMUSCULAR; INTRAVENOUS EVERY 24 HOURS
Refills: 0 | Status: COMPLETED | OUTPATIENT
Start: 2021-01-01 | End: 2021-01-01

## 2021-01-01 RX ORDER — ACETAMINOPHEN 500 MG
2 TABLET ORAL
Qty: 0 | Refills: 0 | DISCHARGE
Start: 2021-01-01

## 2021-01-01 RX ORDER — MORPHINE SULFATE 50 MG/1
2 CAPSULE, EXTENDED RELEASE ORAL
Qty: 0 | Refills: 0 | DISCHARGE
Start: 2021-01-01

## 2021-01-01 RX ORDER — ROBINUL 0.2 MG/ML
0.2 INJECTION INTRAMUSCULAR; INTRAVENOUS ONCE
Refills: 0 | Status: COMPLETED | OUTPATIENT
Start: 2021-01-01 | End: 2021-01-01

## 2021-01-01 RX ORDER — ROBINUL 0.2 MG/ML
0.2 INJECTION INTRAMUSCULAR; INTRAVENOUS EVERY 8 HOURS
Refills: 0 | Status: DISCONTINUED | OUTPATIENT
Start: 2021-01-01 | End: 2021-01-01

## 2021-01-01 RX ORDER — SODIUM CHLORIDE 9 MG/ML
1000 INJECTION, SOLUTION INTRAVENOUS
Qty: 0 | Refills: 0 | DISCHARGE
Start: 2021-01-01

## 2021-01-01 RX ORDER — ROBINUL 0.2 MG/ML
0.2 INJECTION INTRAMUSCULAR; INTRAVENOUS
Qty: 0 | Refills: 0 | DISCHARGE
Start: 2021-01-01

## 2021-01-01 RX ORDER — TAMSULOSIN HYDROCHLORIDE 0.4 MG/1
1 CAPSULE ORAL
Qty: 0 | Refills: 0 | DISCHARGE

## 2021-01-01 RX ORDER — METOPROLOL TARTRATE 50 MG
1 TABLET ORAL
Qty: 0 | Refills: 0 | DISCHARGE

## 2021-01-01 RX ORDER — CADEXOMER IODINE 0.9 %
1 PADS, MEDICATED (EA) TOPICAL
Qty: 0 | Refills: 0 | DISCHARGE
Start: 2021-01-01

## 2021-01-01 RX ORDER — COLLAGENASE CLOSTRIDIUM HIST. 250 UNIT/G
1 OINTMENT (GRAM) TOPICAL
Refills: 0 | Status: DISCONTINUED | OUTPATIENT
Start: 2021-01-01 | End: 2021-01-01

## 2021-01-01 RX ORDER — COLLAGENASE CLOSTRIDIUM HIST. 250 UNIT/G
1 OINTMENT (GRAM) TOPICAL DAILY
Refills: 0 | Status: DISCONTINUED | OUTPATIENT
Start: 2021-01-01 | End: 2021-01-01

## 2021-01-01 RX ORDER — CADEXOMER IODINE 0.9 %
1 PADS, MEDICATED (EA) TOPICAL
Refills: 0 | Status: DISCONTINUED | OUTPATIENT
Start: 2021-01-01 | End: 2021-01-01

## 2021-01-01 RX ORDER — MEMANTINE HYDROCHLORIDE 10 MG/1
1 TABLET ORAL
Qty: 0 | Refills: 0 | DISCHARGE

## 2021-01-01 RX ORDER — ROBINUL 0.2 MG/ML
0.2 INJECTION INTRAMUSCULAR; INTRAVENOUS EVERY 6 HOURS
Refills: 0 | Status: DISCONTINUED | OUTPATIENT
Start: 2021-01-01 | End: 2021-01-01

## 2021-01-01 RX ORDER — FINASTERIDE 5 MG/1
1 TABLET, FILM COATED ORAL
Qty: 0 | Refills: 0 | DISCHARGE

## 2021-01-01 RX ORDER — MORPHINE SULFATE 50 MG/1
1 CAPSULE, EXTENDED RELEASE ORAL EVERY 6 HOURS
Refills: 0 | Status: DISCONTINUED | OUTPATIENT
Start: 2021-01-01 | End: 2021-01-01

## 2021-01-01 RX ORDER — CHLORHEXIDINE GLUCONATE 213 G/1000ML
15 SOLUTION TOPICAL
Refills: 0 | Status: DISCONTINUED | OUTPATIENT
Start: 2021-01-01 | End: 2021-01-01

## 2021-01-01 RX ADMIN — Medication 1 APPLICATION(S): at 12:31

## 2021-01-01 RX ADMIN — Medication 5 MILLIGRAM(S): at 06:30

## 2021-01-01 RX ADMIN — MORPHINE SULFATE 1 MILLIGRAM(S): 50 CAPSULE, EXTENDED RELEASE ORAL at 08:16

## 2021-01-01 RX ADMIN — Medication 1 APPLICATION(S): at 05:15

## 2021-01-01 RX ADMIN — Medication 1 APPLICATION(S): at 18:17

## 2021-01-01 RX ADMIN — Medication 5 MILLIGRAM(S): at 12:14

## 2021-01-01 RX ADMIN — ROBINUL 0.4 MILLIGRAM(S): 0.2 INJECTION INTRAMUSCULAR; INTRAVENOUS at 10:40

## 2021-01-01 RX ADMIN — ROBINUL 0.4 MILLIGRAM(S): 0.2 INJECTION INTRAMUSCULAR; INTRAVENOUS at 02:11

## 2021-01-01 RX ADMIN — SODIUM CHLORIDE 20 MILLILITER(S): 9 INJECTION, SOLUTION INTRAVENOUS at 07:18

## 2021-01-01 RX ADMIN — ROBINUL 0.2 MILLIGRAM(S): 0.2 INJECTION INTRAMUSCULAR; INTRAVENOUS at 12:50

## 2021-01-01 RX ADMIN — Medication 1 APPLICATION(S): at 05:33

## 2021-01-01 RX ADMIN — SODIUM CHLORIDE 20 MILLILITER(S): 9 INJECTION, SOLUTION INTRAVENOUS at 06:37

## 2021-01-01 RX ADMIN — ROBINUL 0.4 MILLIGRAM(S): 0.2 INJECTION INTRAMUSCULAR; INTRAVENOUS at 23:44

## 2021-01-01 RX ADMIN — Medication 650 MILLIGRAM(S): at 05:22

## 2021-01-01 RX ADMIN — MORPHINE SULFATE 1 MILLIGRAM(S): 50 CAPSULE, EXTENDED RELEASE ORAL at 01:46

## 2021-01-01 RX ADMIN — Medication 5 MILLIGRAM(S): at 17:11

## 2021-01-01 RX ADMIN — ROBINUL 0.2 MILLIGRAM(S): 0.2 INJECTION INTRAMUSCULAR; INTRAVENOUS at 13:17

## 2021-01-01 RX ADMIN — ROBINUL 0.4 MILLIGRAM(S): 0.2 INJECTION INTRAMUSCULAR; INTRAVENOUS at 13:20

## 2021-01-01 RX ADMIN — Medication 1 DROP(S): at 14:11

## 2021-01-01 RX ADMIN — Medication 5 MILLIGRAM(S): at 12:31

## 2021-01-01 RX ADMIN — CHLORHEXIDINE GLUCONATE 15 MILLILITER(S): 213 SOLUTION TOPICAL at 17:11

## 2021-01-01 RX ADMIN — MORPHINE SULFATE 1 MILLIGRAM(S): 50 CAPSULE, EXTENDED RELEASE ORAL at 15:52

## 2021-01-01 RX ADMIN — ENOXAPARIN SODIUM 40 MILLIGRAM(S): 100 INJECTION SUBCUTANEOUS at 12:37

## 2021-01-01 RX ADMIN — MORPHINE SULFATE 1 MILLIGRAM(S): 50 CAPSULE, EXTENDED RELEASE ORAL at 08:03

## 2021-01-01 RX ADMIN — SODIUM CHLORIDE 20 MILLILITER(S): 9 INJECTION, SOLUTION INTRAVENOUS at 23:02

## 2021-01-01 RX ADMIN — ROBINUL 0.4 MILLIGRAM(S): 0.2 INJECTION INTRAMUSCULAR; INTRAVENOUS at 05:21

## 2021-01-01 RX ADMIN — Medication 5 MILLIGRAM(S): at 00:21

## 2021-01-01 RX ADMIN — MORPHINE SULFATE 1 MILLIGRAM(S): 50 CAPSULE, EXTENDED RELEASE ORAL at 17:46

## 2021-01-01 RX ADMIN — ROBINUL 0.4 MILLIGRAM(S): 0.2 INJECTION INTRAMUSCULAR; INTRAVENOUS at 13:55

## 2021-01-01 RX ADMIN — Medication 5 MILLIGRAM(S): at 12:37

## 2021-01-01 RX ADMIN — SODIUM CHLORIDE 50 MILLILITER(S): 9 INJECTION, SOLUTION INTRAVENOUS at 12:49

## 2021-01-01 RX ADMIN — SODIUM CHLORIDE 50 MILLILITER(S): 9 INJECTION, SOLUTION INTRAVENOUS at 22:05

## 2021-01-01 RX ADMIN — ENOXAPARIN SODIUM 40 MILLIGRAM(S): 100 INJECTION SUBCUTANEOUS at 12:00

## 2021-01-01 RX ADMIN — Medication 1 DROP(S): at 22:05

## 2021-01-01 RX ADMIN — Medication 5 MILLIGRAM(S): at 06:24

## 2021-01-01 RX ADMIN — Medication 1 DROP(S): at 13:08

## 2021-01-01 RX ADMIN — ROBINUL 0.2 MILLIGRAM(S): 0.2 INJECTION INTRAMUSCULAR; INTRAVENOUS at 21:23

## 2021-01-01 RX ADMIN — MORPHINE SULFATE 1 MILLIGRAM(S): 50 CAPSULE, EXTENDED RELEASE ORAL at 00:44

## 2021-01-01 RX ADMIN — ROBINUL 0.2 MILLIGRAM(S): 0.2 INJECTION INTRAMUSCULAR; INTRAVENOUS at 21:12

## 2021-01-01 RX ADMIN — ENOXAPARIN SODIUM 40 MILLIGRAM(S): 100 INJECTION SUBCUTANEOUS at 14:14

## 2021-01-01 RX ADMIN — Medication 5 MILLIGRAM(S): at 17:26

## 2021-01-01 RX ADMIN — ROBINUL 0.4 MILLIGRAM(S): 0.2 INJECTION INTRAMUSCULAR; INTRAVENOUS at 09:34

## 2021-01-01 RX ADMIN — ROBINUL 0.4 MILLIGRAM(S): 0.2 INJECTION INTRAMUSCULAR; INTRAVENOUS at 23:15

## 2021-01-01 RX ADMIN — ROBINUL 0.4 MILLIGRAM(S): 0.2 INJECTION INTRAMUSCULAR; INTRAVENOUS at 02:19

## 2021-01-01 RX ADMIN — SODIUM CHLORIDE 20 MILLILITER(S): 9 INJECTION, SOLUTION INTRAVENOUS at 23:45

## 2021-01-01 RX ADMIN — ROBINUL 0.2 MILLIGRAM(S): 0.2 INJECTION INTRAMUSCULAR; INTRAVENOUS at 13:10

## 2021-01-01 RX ADMIN — CEFTRIAXONE 100 MILLIGRAM(S): 500 INJECTION, POWDER, FOR SOLUTION INTRAMUSCULAR; INTRAVENOUS at 18:44

## 2021-01-01 RX ADMIN — Medication 10 MILLIGRAM(S): at 03:07

## 2021-01-01 RX ADMIN — Medication 1 APPLICATION(S): at 17:59

## 2021-01-01 RX ADMIN — Medication 5 MILLIGRAM(S): at 18:44

## 2021-01-01 RX ADMIN — MORPHINE SULFATE 1 MILLIGRAM(S): 50 CAPSULE, EXTENDED RELEASE ORAL at 05:22

## 2021-01-01 RX ADMIN — SODIUM CHLORIDE 50 MILLILITER(S): 9 INJECTION, SOLUTION INTRAVENOUS at 12:38

## 2021-01-01 RX ADMIN — ROBINUL 0.4 MILLIGRAM(S): 0.2 INJECTION INTRAMUSCULAR; INTRAVENOUS at 05:31

## 2021-01-01 RX ADMIN — Medication 5 MILLIGRAM(S): at 17:54

## 2021-01-01 RX ADMIN — ROBINUL 0.4 MILLIGRAM(S): 0.2 INJECTION INTRAMUSCULAR; INTRAVENOUS at 17:28

## 2021-01-01 RX ADMIN — SODIUM CHLORIDE 10 MILLILITER(S): 9 INJECTION INTRAMUSCULAR; INTRAVENOUS; SUBCUTANEOUS at 22:27

## 2021-01-01 RX ADMIN — MORPHINE SULFATE 1 MILLIGRAM(S): 50 CAPSULE, EXTENDED RELEASE ORAL at 22:25

## 2021-01-01 RX ADMIN — ROBINUL 0.4 MILLIGRAM(S): 0.2 INJECTION INTRAMUSCULAR; INTRAVENOUS at 06:37

## 2021-01-01 RX ADMIN — MORPHINE SULFATE 1 MILLIGRAM(S): 50 CAPSULE, EXTENDED RELEASE ORAL at 05:31

## 2021-01-01 RX ADMIN — MORPHINE SULFATE 1 MILLIGRAM(S): 50 CAPSULE, EXTENDED RELEASE ORAL at 05:37

## 2021-01-01 RX ADMIN — MORPHINE SULFATE 1 MILLIGRAM(S): 50 CAPSULE, EXTENDED RELEASE ORAL at 17:12

## 2021-01-01 RX ADMIN — SODIUM CHLORIDE 50 MILLILITER(S): 9 INJECTION, SOLUTION INTRAVENOUS at 14:12

## 2021-01-01 RX ADMIN — Medication 5 MILLIGRAM(S): at 12:01

## 2021-01-01 RX ADMIN — Medication 1 DROP(S): at 18:17

## 2021-01-01 RX ADMIN — ROBINUL 0.2 MILLIGRAM(S): 0.2 INJECTION INTRAMUSCULAR; INTRAVENOUS at 13:52

## 2021-01-01 RX ADMIN — Medication 5 MILLIGRAM(S): at 23:15

## 2021-01-01 RX ADMIN — ENOXAPARIN SODIUM 40 MILLIGRAM(S): 100 INJECTION SUBCUTANEOUS at 12:48

## 2021-01-01 RX ADMIN — Medication 5 MILLIGRAM(S): at 17:27

## 2021-01-01 RX ADMIN — MORPHINE SULFATE 1 MILLIGRAM(S): 50 CAPSULE, EXTENDED RELEASE ORAL at 12:08

## 2021-01-01 RX ADMIN — MORPHINE SULFATE 1 MILLIGRAM(S): 50 CAPSULE, EXTENDED RELEASE ORAL at 01:52

## 2021-01-01 RX ADMIN — Medication 5 MILLIGRAM(S): at 12:48

## 2021-01-01 RX ADMIN — MORPHINE SULFATE 1 MILLIGRAM(S): 50 CAPSULE, EXTENDED RELEASE ORAL at 03:07

## 2021-01-01 RX ADMIN — MORPHINE SULFATE 1 MILLIGRAM(S): 50 CAPSULE, EXTENDED RELEASE ORAL at 23:03

## 2021-01-01 RX ADMIN — ROBINUL 0.4 MILLIGRAM(S): 0.2 INJECTION INTRAMUSCULAR; INTRAVENOUS at 10:20

## 2021-01-01 RX ADMIN — SODIUM CHLORIDE 20 MILLILITER(S): 9 INJECTION, SOLUTION INTRAVENOUS at 05:24

## 2021-01-01 RX ADMIN — Medication 5 MILLIGRAM(S): at 13:58

## 2021-01-01 RX ADMIN — MORPHINE SULFATE 1 MILLIGRAM(S): 50 CAPSULE, EXTENDED RELEASE ORAL at 23:05

## 2021-01-01 RX ADMIN — Medication 5 MILLIGRAM(S): at 00:25

## 2021-01-01 RX ADMIN — ENOXAPARIN SODIUM 40 MILLIGRAM(S): 100 INJECTION SUBCUTANEOUS at 12:32

## 2021-01-01 RX ADMIN — Medication 1 APPLICATION(S): at 13:53

## 2021-01-01 RX ADMIN — Medication 5 MILLIGRAM(S): at 18:15

## 2021-01-01 RX ADMIN — ROBINUL 0.2 MILLIGRAM(S): 0.2 INJECTION INTRAMUSCULAR; INTRAVENOUS at 05:02

## 2021-01-01 RX ADMIN — Medication 5 MILLIGRAM(S): at 18:17

## 2021-01-01 RX ADMIN — MORPHINE SULFATE 1 MILLIGRAM(S): 50 CAPSULE, EXTENDED RELEASE ORAL at 23:44

## 2021-01-01 RX ADMIN — CEFTRIAXONE 100 MILLIGRAM(S): 500 INJECTION, POWDER, FOR SOLUTION INTRAMUSCULAR; INTRAVENOUS at 17:52

## 2021-01-01 RX ADMIN — ROBINUL 0.4 MILLIGRAM(S): 0.2 INJECTION INTRAMUSCULAR; INTRAVENOUS at 01:52

## 2021-01-01 RX ADMIN — SODIUM CHLORIDE 50 MILLILITER(S): 9 INJECTION, SOLUTION INTRAVENOUS at 21:15

## 2021-01-01 RX ADMIN — Medication 5 MILLIGRAM(S): at 00:51

## 2021-01-01 RX ADMIN — MORPHINE SULFATE 1 MILLIGRAM(S): 50 CAPSULE, EXTENDED RELEASE ORAL at 06:35

## 2021-01-01 RX ADMIN — MORPHINE SULFATE 1 MILLIGRAM(S): 50 CAPSULE, EXTENDED RELEASE ORAL at 17:28

## 2021-01-01 RX ADMIN — Medication 5 MILLIGRAM(S): at 13:52

## 2021-01-01 RX ADMIN — ROBINUL 0.4 MILLIGRAM(S): 0.2 INJECTION INTRAMUSCULAR; INTRAVENOUS at 14:00

## 2021-01-01 RX ADMIN — ENOXAPARIN SODIUM 40 MILLIGRAM(S): 100 INJECTION SUBCUTANEOUS at 13:09

## 2021-01-01 RX ADMIN — Medication 1 DROP(S): at 05:26

## 2021-01-01 RX ADMIN — SODIUM CHLORIDE 50 MILLILITER(S): 9 INJECTION, SOLUTION INTRAVENOUS at 20:44

## 2021-01-01 RX ADMIN — ROBINUL 0.4 MILLIGRAM(S): 0.2 INJECTION INTRAMUSCULAR; INTRAVENOUS at 18:21

## 2021-01-01 RX ADMIN — Medication 5 MILLIGRAM(S): at 05:15

## 2021-01-01 RX ADMIN — ROBINUL 0.4 MILLIGRAM(S): 0.2 INJECTION INTRAMUSCULAR; INTRAVENOUS at 23:06

## 2021-01-01 RX ADMIN — ROBINUL 0.4 MILLIGRAM(S): 0.2 INJECTION INTRAMUSCULAR; INTRAVENOUS at 17:26

## 2021-01-01 RX ADMIN — ROBINUL 0.4 MILLIGRAM(S): 0.2 INJECTION INTRAMUSCULAR; INTRAVENOUS at 17:46

## 2021-01-01 RX ADMIN — Medication 5 MILLIGRAM(S): at 23:44

## 2021-01-01 RX ADMIN — Medication 1 DROP(S): at 06:25

## 2021-01-01 RX ADMIN — ROBINUL 0.2 MILLIGRAM(S): 0.2 INJECTION INTRAMUSCULAR; INTRAVENOUS at 06:24

## 2021-01-01 RX ADMIN — Medication 5 MILLIGRAM(S): at 05:37

## 2021-01-01 RX ADMIN — ROBINUL 0.2 MILLIGRAM(S): 0.2 INJECTION INTRAMUSCULAR; INTRAVENOUS at 21:47

## 2021-01-01 RX ADMIN — Medication 1 DROP(S): at 21:12

## 2021-01-01 RX ADMIN — Medication 1 DROP(S): at 13:51

## 2021-01-01 RX ADMIN — Medication 5 MILLIGRAM(S): at 00:39

## 2021-01-01 RX ADMIN — Medication 5 MILLIGRAM(S): at 18:00

## 2021-01-01 RX ADMIN — Medication 5 MILLIGRAM(S): at 22:27

## 2021-01-01 RX ADMIN — Medication 5 MILLIGRAM(S): at 05:33

## 2021-01-01 RX ADMIN — ENOXAPARIN SODIUM 40 MILLIGRAM(S): 100 INJECTION SUBCUTANEOUS at 13:52

## 2021-01-01 RX ADMIN — ROBINUL 0.4 MILLIGRAM(S): 0.2 INJECTION INTRAMUSCULAR; INTRAVENOUS at 12:38

## 2021-01-01 RX ADMIN — Medication 1 DROP(S): at 21:23

## 2021-01-01 RX ADMIN — Medication 1 APPLICATION(S): at 06:24

## 2021-01-01 RX ADMIN — ROBINUL 0.4 MILLIGRAM(S): 0.2 INJECTION INTRAMUSCULAR; INTRAVENOUS at 14:07

## 2021-01-01 RX ADMIN — Medication 1 APPLICATION(S): at 12:37

## 2021-01-01 RX ADMIN — ROBINUL 0.4 MILLIGRAM(S): 0.2 INJECTION INTRAMUSCULAR; INTRAVENOUS at 05:37

## 2021-01-01 RX ADMIN — CEFTRIAXONE 100 MILLIGRAM(S): 500 INJECTION, POWDER, FOR SOLUTION INTRAMUSCULAR; INTRAVENOUS at 18:00

## 2021-01-01 RX ADMIN — ROBINUL 0.4 MILLIGRAM(S): 0.2 INJECTION INTRAMUSCULAR; INTRAVENOUS at 05:23

## 2021-01-01 RX ADMIN — MORPHINE SULFATE 1 MILLIGRAM(S): 50 CAPSULE, EXTENDED RELEASE ORAL at 05:00

## 2021-01-01 RX ADMIN — Medication 5 MILLIGRAM(S): at 06:27

## 2021-01-01 RX ADMIN — Medication 5 MILLIGRAM(S): at 05:32

## 2021-01-01 RX ADMIN — Medication 1 APPLICATION(S): at 13:09

## 2021-01-01 RX ADMIN — ROBINUL 0.4 MILLIGRAM(S): 0.2 INJECTION INTRAMUSCULAR; INTRAVENOUS at 22:27

## 2021-01-01 RX ADMIN — ROBINUL 0.2 MILLIGRAM(S): 0.2 INJECTION INTRAMUSCULAR; INTRAVENOUS at 12:32

## 2021-01-01 RX ADMIN — Medication 5 MILLIGRAM(S): at 18:22

## 2021-01-01 RX ADMIN — SODIUM CHLORIDE 50 MILLILITER(S): 9 INJECTION, SOLUTION INTRAVENOUS at 06:22

## 2021-01-01 RX ADMIN — CHLORHEXIDINE GLUCONATE 15 MILLILITER(S): 213 SOLUTION TOPICAL at 14:10

## 2021-01-01 RX ADMIN — Medication 5 MILLIGRAM(S): at 13:09

## 2021-01-01 RX ADMIN — ROBINUL 0.4 MILLIGRAM(S): 0.2 INJECTION INTRAMUSCULAR; INTRAVENOUS at 23:13

## 2021-01-01 RX ADMIN — Medication 5 MILLIGRAM(S): at 00:43

## 2021-01-01 RX ADMIN — Medication 1 APPLICATION(S): at 17:54

## 2021-01-01 RX ADMIN — MORPHINE SULFATE 1 MILLIGRAM(S): 50 CAPSULE, EXTENDED RELEASE ORAL at 17:29

## 2021-01-01 RX ADMIN — MORPHINE SULFATE 1 MILLIGRAM(S): 50 CAPSULE, EXTENDED RELEASE ORAL at 11:07

## 2021-01-01 RX ADMIN — MORPHINE SULFATE 1 MILLIGRAM(S): 50 CAPSULE, EXTENDED RELEASE ORAL at 12:15

## 2021-01-01 RX ADMIN — ENOXAPARIN SODIUM 40 MILLIGRAM(S): 100 INJECTION SUBCUTANEOUS at 12:38

## 2021-01-01 RX ADMIN — SODIUM CHLORIDE 20 MILLILITER(S): 9 INJECTION, SOLUTION INTRAVENOUS at 20:17

## 2021-01-01 RX ADMIN — MORPHINE SULFATE 1 MILLIGRAM(S): 50 CAPSULE, EXTENDED RELEASE ORAL at 11:28

## 2021-01-01 RX ADMIN — Medication 1 APPLICATION(S): at 13:58

## 2021-01-01 RX ADMIN — ROBINUL 0.4 MILLIGRAM(S): 0.2 INJECTION INTRAMUSCULAR; INTRAVENOUS at 12:02

## 2021-01-01 RX ADMIN — Medication 1 APPLICATION(S): at 12:48

## 2021-01-01 RX ADMIN — Medication 5 MILLIGRAM(S): at 06:17

## 2021-01-01 RX ADMIN — Medication 650 MILLIGRAM(S): at 06:10

## 2021-01-01 RX ADMIN — SODIUM CHLORIDE 10 MILLILITER(S): 9 INJECTION INTRAMUSCULAR; INTRAVENOUS; SUBCUTANEOUS at 07:39

## 2021-01-01 RX ADMIN — Medication 5 MILLIGRAM(S): at 23:48

## 2021-01-01 RX ADMIN — MORPHINE SULFATE 1 MILLIGRAM(S): 50 CAPSULE, EXTENDED RELEASE ORAL at 18:22

## 2021-01-01 RX ADMIN — Medication 1 APPLICATION(S): at 12:01

## 2021-01-01 RX ADMIN — Medication 1 DROP(S): at 12:39

## 2021-01-01 RX ADMIN — Medication 1 DROP(S): at 05:02

## 2021-01-01 RX ADMIN — SODIUM CHLORIDE 50 MILLILITER(S): 9 INJECTION, SOLUTION INTRAVENOUS at 06:32

## 2021-01-01 RX ADMIN — SODIUM CHLORIDE 20 MILLILITER(S): 9 INJECTION, SOLUTION INTRAVENOUS at 08:21

## 2021-01-01 RX ADMIN — MORPHINE SULFATE 1 MILLIGRAM(S): 50 CAPSULE, EXTENDED RELEASE ORAL at 23:18

## 2021-01-01 RX ADMIN — MORPHINE SULFATE 1 MILLIGRAM(S): 50 CAPSULE, EXTENDED RELEASE ORAL at 12:03

## 2021-01-01 RX ADMIN — ROBINUL 0.2 MILLIGRAM(S): 0.2 INJECTION INTRAMUSCULAR; INTRAVENOUS at 14:11

## 2021-01-01 RX ADMIN — SODIUM CHLORIDE 50 MILLILITER(S): 9 INJECTION, SOLUTION INTRAVENOUS at 13:53

## 2021-01-01 RX ADMIN — SODIUM CHLORIDE 50 MILLILITER(S): 9 INJECTION, SOLUTION INTRAVENOUS at 18:00

## 2021-01-01 RX ADMIN — Medication 5 MILLIGRAM(S): at 23:35

## 2021-01-01 RX ADMIN — ROBINUL 0.4 MILLIGRAM(S): 0.2 INJECTION INTRAMUSCULAR; INTRAVENOUS at 17:13

## 2021-01-01 RX ADMIN — ROBINUL 0.2 MILLIGRAM(S): 0.2 INJECTION INTRAMUSCULAR; INTRAVENOUS at 05:30

## 2021-01-01 RX ADMIN — ROBINUL 0.4 MILLIGRAM(S): 0.2 INJECTION INTRAMUSCULAR; INTRAVENOUS at 11:07

## 2021-01-01 RX ADMIN — ROBINUL 0.4 MILLIGRAM(S): 0.2 INJECTION INTRAMUSCULAR; INTRAVENOUS at 23:02

## 2021-01-01 RX ADMIN — Medication 5 MILLIGRAM(S): at 05:01

## 2021-01-01 RX ADMIN — SODIUM CHLORIDE 20 MILLILITER(S): 9 INJECTION, SOLUTION INTRAVENOUS at 07:57

## 2021-01-01 RX ADMIN — Medication 5 MILLIGRAM(S): at 00:30

## 2021-01-01 RX ADMIN — Medication 1 DROP(S): at 06:27

## 2021-01-01 RX ADMIN — SODIUM CHLORIDE 50 MILLILITER(S): 9 INJECTION, SOLUTION INTRAVENOUS at 21:23

## 2021-01-01 RX ADMIN — Medication 5 MILLIGRAM(S): at 12:38

## 2021-03-10 NOTE — H&P ADULT - PROBLEM SELECTOR PLAN 6
-Unclear if patient was still on prednisone at home. Needs to be clarified. Will hold off on any steroids at this time regardless.

## 2021-03-10 NOTE — ED ADULT NURSE NOTE - NSIMPLEMENTINTERV_GEN_ALL_ED
Implemented All Fall with Harm Risk Interventions:  [place] to call system. Call bell, personal items and telephone within reach. Instruct patient to call for assistance. Room bathroom lighting operational. Non-slip footwear when patient is off stretcher. Physically safe environment: no spills, clutter or unnecessary equipment. Stretcher in lowest position, wheels locked, appropriate side rails in place. Provide visual cue, wrist band, yellow gown, etc. Monitor gait and stability. Monitor for mental status changes and reorient to person, place, and time. Review medications for side effects contributing to fall risk. Reinforce activity limits and safety measures with patient and family. Provide visual clues: red socks.

## 2021-03-10 NOTE — CONSULT NOTE ADULT - SUBJECTIVE AND OBJECTIVE BOX
HPI: 89yo M with PMH of Afib not on Eliquis due to previous fall, s/p PPM insertion, CAD s/p CABG, dementia (baseline AAOx1) presenting as code stroke for R hemiparesis and speech disturbance with LWK 11p 3/9/2021 when patient went to bed at his baseline. She reports that he woke up around 5a and she noted he seemed off from his baseline, was less interactive, and noted the right arm and leg were clumsy.     (Stroke only)  NIHSS: 24  MRS: 5    REVIEW OF SYSTEMS    A 10-system ROS was performed and is negative except for those items noted above and/or in the HPI.    PAST MEDICAL & SURGICAL HISTORY:  History of polymyalgia rheumatica  this is why patient is on chronic prednisone    HTN (hypertension)    BPH (benign prostatic hyperplasia)    Atrial fibrillation    H/O cardiac pacemaker    S/P CABG x 5    FAMILY HISTORY:  No pertinent family history in first degree relatives    MEDICATIONS (HOME):  Home Medications:  acetaminophen 325 mg oral tablet: 2 tab(s) orally every 6 hours, As needed, Mild Pain (1 - 3) (08 Sep 2020 07:35)  calcium carbonate 500 mg (200 mg elemental calcium) oral tablet, chewable: 1 tab(s) orally every 4 hours, As needed, Heartburn (08 Sep 2020 07:35)  cholecalciferol oral tablet: 1000 unit(s) orally once a day (08 Sep 2020 07:35)  finasteride 5 mg oral tablet: 1 tab(s) orally once a day (03 Sep 2020 09:29)  lidocaine 5% topical film: Apply topically to affected area once a day (08 Sep 2020 07:35)  memantine 10 mg oral tablet: 1 tab(s) orally 2 times a day (03 Sep 2020 09:29)  metoprolol succinate 25 mg oral tablet, extended release: 1 tab(s) orally once a day (03 Sep 2020 09:29)  predniSONE 1 mg oral tablet: 1 tab(s) orally in AM and 2 tabs orally in PM (03 Sep 2020 09:29)  rosuvastatin 20 mg oral tablet: 1 tab(s) orally once a day (03 Sep 2020 09:29)  tamsulosin 0.4 mg oral capsule: 1 cap(s) orally once a day (03 Sep 2020 09:29)    MEDICATIONS  (STANDING):    MEDICATIONS  (PRN):    ALLERGIES/INTOLERANCES:  Allergies  No Known Allergies    VITALS & EXAMINATION:  Vital Signs Last 24 Hrs  T(C): 36.3 (10 Mar 2021 12:35), Max: 36.3 (10 Mar 2021 12:35)  T(F): 97.3 (10 Mar 2021 12:35), Max: 97.3 (10 Mar 2021 12:35)  HR: 104 (10 Mar 2021 12:35) (104 - 104)  BP: 151/89 (10 Mar 2021 12:35) (151/89 - 151/89)  BP(mean): --  RR: 18 (10 Mar 2021 12:35) (18 - 18)  SpO2: 98% (10 Mar 2021 12:35) (98% - 98%)    Neurological (>12):  MS: eyes open, alert, no verbal output, gurgling and snoring noises, withdraws and grimaces to noxious stimuli  CN: noted mild left eye exotropia, pupils 2mm minimally reactive to light, difficult to evaluate for VF, right moderate facial droop  Motor: left arm and leg move spontaneously; right arm and leg minimal movement  Cerebellar: difficult to evaluate given weakness  Gait: bedbound    LABORATORY:  CBC                       10.0   11.16 )-----------( 201      ( 10 Mar 2021 12:53 )             31.7     Chem 03-10    140  |  104  |  30<H>  ----------------------------<  94  5.4<H>   |  24  |  0.92    Ca    10.9<H>      10 Mar 2021 12:53    TPro  7.5  /  Alb  3.4  /  TBili  0.3  /  DBili  x   /  AST  55<H>  /  ALT  34  /  AlkPhos  154<H>  03-10    LFTs LIVER FUNCTIONS - ( 10 Mar 2021 12:53 )  Alb: 3.4 g/dL / Pro: 7.5 g/dL / ALK PHOS: 154 U/L / ALT: 34 U/L / AST: 55 U/L / GGT: x           Coagulopathy PT/INR - ( 10 Mar 2021 14:27 )   PT: 11.8 sec;   INR: 0.98 ratio         PTT - ( 10 Mar 2021 14:27 )  PTT:25.7 sec    STUDIES & IMAGING:  Studies (EKG, EEG, EMG, etc):     Radiology (XR, CT, MR, U/S, TTE/FLORES):    < from: CT Angio Neck w/ IV Cont (03.10.21 @ 13:21) >  CTA brain:  Lack of flow related enhancement of the left M1 segment, the left MCA bifurcation, and proximal left M2 branches. Decreased flow related enhancement of more distal left MCA branches. Findings are consistent with the presence of intraluminal thrombus or embolus.    No vascular aneurysm. No AVM.    CTA neck:  No flow-limiting stenosis or evidence for arterial dissection.    CT brain perfusion:  Cerebral blood flow less than 30% = 0 mL, therefore no core infarct is predicted.    Tmax greater than 6 seconds = 123 mL and involves much of the left MCA territory. This indicates territory at continued ischemic risk and the presence of neurologic symptoms.    < end of copied text >  < from: CT Angio Neck w/ IV Cont (03.10.21 @ 13:21) >  CTA brain:  Lack of flow related enhancement of the left M1 segment, the left MCA bifurcation, and proximal left M2 branches. Decreased flow related enhancement of more distal left MCA branches. Findings are consistent with the presence of intraluminal thrombus or embolus.    No vascular aneurysm. No AVM.    CTA neck:  No flow-limiting stenosis or evidence for arterial dissection.    CT brain perfusion:  Cerebral blood flow less than 30% = 0 mL, therefore no core infarct is predicted.    Tmax greater than 6 seconds = 123 mL and involves much of the left MCA territory. This indicates territory at continued ischemic risk and the presence of neurologic symptoms.

## 2021-03-10 NOTE — ED ADULT NURSE REASSESSMENT NOTE - NS ED NURSE REASSESS COMMENT FT1
Patient undressed, diaper changed, repositioned for comfort. Pending neuro and plan of care. Safety measures in place.

## 2021-03-10 NOTE — ED ADULT NURSE NOTE - OBJECTIVE STATEMENT
complaining of right facial droop. Pt BIBA, EMS states, "Pt last seen normal from wife at 11pm last night. Pt A&Ox1 and unclear speech at baseline. Nurse aide sats 9am today right sided weakness and right sided facial droop. PMH dementia, afib, pacemaker." Pt does not verbalize any symptoms at present. See neuroflow sheet. Cardiac monitor placed, paced rhythm. Eliquis was stopped weeks ago.

## 2021-03-10 NOTE — H&P ADULT - NSHPPHYSICALEXAM_GEN_ALL_CORE
PHYSICAL EXAM:  Vital Signs Last 24 Hrs  T(C): 36.3 (03-10-21 @ 12:35)  T(F): 97.3 (03-10-21 @ 12:35), Max: 97.3 (03-10-21 @ 12:35)  HR: 104 (03-10-21 @ 12:35) (104 - 104)  BP: 151/89 (03-10-21 @ 12:35)  BP(mean): --  RR: 18 (03-10-21 @ 12:35) (18 - 18)  SpO2: 98% (03-10-21 @ 12:35) (98% - 98%)  Wt(kg): --    Constitutional: appears somewhat uncomfortable but not in pain.   ENT:  dry mm  Neck: Soft and supple,  Respiratory: Breath sounds are clear bilaterally - distant, No wheezing, rales or rhonchi  Cardiovascular: S1 and S2, irregular rate and rhythm, systolic murmur heard  Gastrointestinal: Bowel Sounds present, soft, nontender, nondistended, no guarding, no rebound  Extremities: No cyanosis or clubbing or edema; warm to touch  Neurological: Awake but not communicating. Right sided weakness.   Skin: B/l LE distal skin wounds bandaged.   Psych: No depression or anhedonia

## 2021-03-10 NOTE — CONSULT NOTE ADULT - ASSESSMENT
Assessment: 89yo M with PMH of Afib not on Eliquis due to previous fall, s/p PPM insertion, CAD s/p CABG, dementia (baseline AAOx1) presenting as code stroke for R hemiparesis and speech disturbance with LWK 11p 3/9/2021 when patient went to bed at his baseline. Neurological examination demonstrates right hemiparesis and global aphasia. CT/CTA/CTP demonstrates LM1 occlusion with 0cc core infarct and 123cc hypoperfused area. Patient out of window for tpa. Patient poor baseline so not a candidate for mechanical thrombectomy.     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 Arce, had extensive goals of care conversation with patient  MOLST form filled out and in chart  DNR/DNI per patient's HCP Radha  would recommend holding off anticoagulation at this time due to extensive size of LMCA infarct for at least 14 days, but to be re-addressed at that time  permissive HTN - blood pressure to go no higher than 220/110 for at least 24h followed by gradual normotension  given extensive size of LMCA infarct from CTP penumbra, would warrant hemicraniectomy watch - however, with HCP preferring non-aggressive measures and goals of care consistent with comfort care,  would not need close neuro checks for hemicraniectomy watch  with risk for cerebral edema, would warrant hypertonic saline 2% NS - however this warrants q6h BMP checks for Na 145-155 - if not in line with goals of care, would not be needed  failed dysphagia screen - needs official speech/swallow eval - family still deciding on PEG/NGT versus pleasure feeds  PT/OT as appropriate    Management d/w Stroke Fellow Dr. Santos; d/w Palliative Care Dr. Cohn; patient's wife Radha; patient's son (Neurologist) Dr. Raz Arce; to d/w Dr. Ugarte Stroke Attending; ED Housestaff    Radha: (c) [phone]  (h) [phone]    Dr. Arce: [phone] Assessment: 89yo M with PMH of Afib not on Eliquis due to previous fall, s/p PPM insertion, CAD s/p CABG, dementia (baseline AAOx1) presenting as code stroke for R hemiparesis and speech disturbance with LWK 11p 3/9/2021 when patient went to bed at his baseline. Neurological examination demonstrates right hemiparesis and global aphasia. CT/CTA/CTP demonstrates LM1 occlusion with 0cc core infarct and 123cc hypoperfused area. Patient out of window for tpa. Patient poor baseline so not a candidate for mechanical thrombectomy.     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 Arce, had extensive goals of care conversation with patient  MOLST form filled out and in chart  DNR/DNI per patient's HCP Radha Garcia requesting wound care consult, reports that patient gets medihoney on wounds  would recommend holding off anticoagulation at this time due to extensive size of LMCA infarct for at least 14 days, but to be re-addressed at that time  permissive HTN - blood pressure to go no higher than 220/110 for at least 24h followed by gradual normotension  given extensive size of LMCA infarct from CTP penumbra, would warrant hemicraniectomy watch - however, with HCP preferring non-aggressive measures and goals of care consistent with comfort care,  would not need close neuro checks for hemicraniectomy watch  with risk for cerebral edema, would warrant hypertonic saline 2% NS - however this warrants q6h BMP checks for Na 145-155 - if not in line with goals of care, would not be needed  failed dysphagia screen - needs official speech/swallow eval - family still deciding on PEG/NGT versus pleasure feeds  PT/OT as appropriate    Management d/w Stroke Fellow Dr. Santos; d/w Palliative Care Dr. Cohn; patient's wife Radha; patient's son (Neurologist) Dr. Raz Arce; to d/w Dr. Ugarte Stroke Attending; ED Housestaff    Radha: (c) [phone]  (h) [phone]    Dr. Arce: [phone]

## 2021-03-10 NOTE — H&P ADULT - NSICDXPASTMEDICALHX_GEN_ALL_CORE_FT
PAST MEDICAL HISTORY:  Atrial fibrillation     BPH (benign prostatic hyperplasia)     History of polymyalgia rheumatica this is why patient is on chronic prednisone    HTN (hypertension)      PAST MEDICAL HISTORY:  Atrial fibrillation     BPH (benign prostatic hyperplasia)     Dementia     History of polymyalgia rheumatica this is why patient is on chronic prednisone    HTN (hypertension)     Hyperlipidemia

## 2021-03-10 NOTE — CONSULT NOTE ADULT - SUBJECTIVE AND OBJECTIVE BOX
HPI:  89 M w/ afib (Not on eliquis per the wife) CABG, PPM, HTN, BPH, and dementia aaox1 per chart documentation, wife states that her  aware of everyone in the family, he knows who the president is and reports that last night he had a dream and was talking a lot this AM when the nursing aide came, she witnessed that the pt wasn't moving his R arm. She states that her son is a physician so she called him who told her to give aspirin. Pt per wife is able to go to the bathroom, w/ a walker and assistance from the nursing aide. Pt is aphasic.     CT shows: ct< from: CT Perfusion w/ Maps w/ IV Cont (03.10.21 @ 13:22) >    EXAM:  CT PERFUSION W MAPS IC                            PROCEDURE DATE:  03/10/2021    CTA brain:  Lack of flow related enhancement of the left M1 segment, the left MCA bifurcation, and proximal left M2 branches. Decreased flow related enhancement of more distal left MCA branches. Findings are consistent with the presence of intraluminal thrombus or embolus.    No vascular aneurysm. No AVM.    CTA neck:  No flow-limiting stenosis or evidence for arterial dissection.    CT brain perfusion:  Cerebral blood flow less than 30% = 0 mL, therefore no core infarct is predicted.    Tmax greater than 6 seconds = 123 mL and involves much of the left MCA territory. This indicates territory at continued ischemic risk and the presence of neurologic symptoms.    Dr. Sorensen discussed these findings with neurology stroke PA Jennifer on 3/10/2021 1:23 PM with read back.                  PEDRO LUIS SORENSEN MD; Attending Radiologist  This document has been electronically signed. Mar 10 2021  1:43PM    < end of copied text >    PERTINENT PM/SXH:   History of polymyalgia rheumatica    HTN (hypertension)    BPH (benign prostatic hyperplasia)    Atrial fibrillation      H/O cardiac pacemaker    S/P CABG x 5      FAMILY HISTORY:  No pertinent family history in first degree relatives      ITEMS NOT CHECKED ARE NOT PRESENT    SOCIAL HISTORY:   Significant other/partner[ ]  Children[ ]  Restorationism/Spirituality:  Substance hx:  [ ]   Tobacco hx:  [ ]   Alcohol hx: [ ]   Home Opioid hx:  [ ] I-Stop Reference No:  Living Situation: [ ]Home  [ ]Long term care  [ ]Rehab [ ]Other    ADVANCE DIRECTIVES:    DNR  MOLST  [ ]  Living Will  [ ]   DECISION MAKER(s):  [ ] Health Care Proxy(s)  [ ] Surrogate(s)  [ ] Guardian           Name(s): Phone Number(s):    BASELINE (I)ADL(s) (prior to admission):  [place]: [ ]Total  [ ] Moderate [ ]Dependent    Allergies    No Known Allergies    Intolerances    MEDICATIONS  (STANDING):    MEDICATIONS  (PRN):    PRESENT SYMPTOMS: [ ]Unable to obtain due to poor mentation   Source if other than patient:  [ ]Family   [ ]Team     Pain: [ ]yes [ ]no  QOL impact -   Location -                    Aggravating factors -  Quality -  Radiation -  Timing-  Severity (0-10 scale):  Minimal acceptable level (0-10 scale):     CPOT:    https://www.[facility].org/getattachment/[email or web address]/Critical-Care-Pain-Observation-Tool-(CPOT)      PAIN AD Score:     http://[email or web address]/painad.pdf (press ctrl +  left click to view)    Dyspnea:                           [ ]Mild [ ]Moderate [ ]Severe  Anxiety:                             [ ]Mild [ ]Moderate [ ]Severe  Fatigue:                             [ ]Mild [ ]Moderate [ ]Severe  Nausea:                             [ ]Mild [ ]Moderate [ ]Severe  Loss of appetite:              [ ]Mild [ ]Moderate [ ]Severe  Constipation:                    [ ]Mild [ ]Moderate [ ]Severe    Other Symptoms:  [ ]All other review of systems negative     Palliative Performance Status Version 2:         %    http://[email or web address]/files/[email or web address]_performance_scale_[email or web address].pdf  PHYSICAL EXAM:  Vital Signs Last 24 Hrs  T(C): 36.3 (10 Mar 2021 12:35), Max: 36.3 (10 Mar 2021 12:35)  T(F): 97.3 (10 Mar 2021 12:35), Max: 97.3 (10 Mar 2021 12:35)  HR: 104 (10 Mar 2021 12:35) (104 - 104)  BP: 151/89 (10 Mar 2021 12:35) (151/89 - 151/89)  BP(mean): --  RR: 18 (10 Mar 2021 12:35) (18 - 18)  SpO2: 98% (10 Mar 2021 12:35) (98% - 98%) I&O's Summary    GENERAL:  [ ]Alert  [ ]Oriented x   [ ]Lethargic  [ ]Cachexia  [ ]Unarousable  [ ]Verbal  [ ]Non-Verbal  Behavioral:   [ ] Anxiety  [ ] Delirium [ ] Agitation [ ] Other  HEENT:  [ ]Normal   [ ]Dry mouth   [ ]ET Tube/Trach  [ ]Oral lesions  PULMONARY:   [ ]Clear [ ]Tachypnea  [ ]Audible excessive secretions   [ ]Rhonchi        [ ]Right [ ]Left [ ]Bilateral  [ ]Crackles        [ ]Right [ ]Left [ ]Bilateral  [ ]Wheezing     [ ]Right [ ]Left [ ]Bilateral  [ ]Diminished breath sounds [ ]right [ ]left [ ]bilateral  CARDIOVASCULAR:    [ ]Regular [ ]Irregular [ ]Tachy  [ ]Ja [ ]Murmur [ ]Other  GASTROINTESTINAL:  [ ]Soft  [ ]Distended   [ ]+BS  [ ]Non tender [ ]Tender  [ ]PEG [ ]OGT/ NGT  Last BM:   GENITOURINARY:  [ ]Normal [ ] Incontinent   [ ]Oliguria/Anuria   [ ]Delgado  MUSCULOSKELETAL:   [ ]Normal   [ ]Weakness  [ ]Bed/Wheelchair bound [ ]Edema  NEUROLOGIC:   [ ]No focal deficits  [ ]Cognitive impairment  [ ]Dysphagia [ ]Dysarthria [ ]Paresis [ ]Other   SKIN:   [ ]Normal    [ ]Rash  [ ]Pressure ulcer(s)       Present on admission [ ]y [ ]n    CRITICAL CARE:  [ ] Shock Present  [ ]Septic [ ]Cardiogenic [ ]Neurologic [ ]Hypovolemic  [ ]  Vasopressors [ ]  Inotropes   [ ]Respiratory failure present [ ]Mechanical ventilation [ ]Non-invasive ventilatory support [ ]High flow    [ ]Acute  [ ]Chronic [ ]Hypoxic  [ ]Hypercarbic [ ]Other  [ ]Other organ failure     LABS:                        10.0   11.16 )-----------( 201      ( 10 Mar 2021 12:53 )             31.7   03-10    140  |  104  |  30<H>  ----------------------------<  94  5.4<H>   |  24  |  0.92    Ca    10.9<H>      10 Mar 2021 12:53    TPro  7.5  /  Alb  3.4  /  TBili  0.3  /  DBili  x   /  AST  55<H>  /  ALT  34  /  AlkPhos  154<H>  03-10  PT/INR - ( 10 Mar 2021 14:27 )   PT: 11.8 sec;   INR: 0.98 ratio         PTT - ( 10 Mar 2021 14:27 )  PTT:25.7 sec      RADIOLOGY & ADDITIONAL STUDIES:    PROTEIN CALORIE MALNUTRITION PRESENT: [ ]mild [ ]moderate [ ]severe [ ]underweight [ ]morbid obesity  https://www.andeal.org/vault/2440/web/files/ONC/Table_Clinical%20Characteristics%20to%20Document%20Malnutrition-White%20JV%20et%20al%202012.pdf    Height (cm): 182.9 (03-10-21 @ 12:35), 182.9 (12-28-20 @ 11:09), 182.9 (09-02-20 @ 22:15)  Weight (kg): 67.7 (03-10-21 @ 13:22), 77.1 (12-28-20 @ 11:09), 71.3 (09-02-20 @ 22:15)  BMI (kg/m2): 20.2 (03-10-21 @ 13:22), 23 (03-10-21 @ 12:35), 23 (12-28-20 @ 11:09)    [ ]PPSV2 < or = to 30% [ ]significant weight loss  [ ]poor nutritional intake  [ ]anasarca     Albumin, Serum: 3.4 g/dL (03-10-21 @ 12:53)   [ ]Artificial Nutrition      REFERRALS:   [ ]Chaplaincy  [ ]Hospice  [ ]Child Life  [ ]Social Work  [ ]Case management [ ]Holistic Therapy     Goals of Care Document:  HPI:  89 M w/ afib (Not on eliquis per the wife) CABG, PPM, HTN, BPH, and dementia aaox1. Per EMR, "wife states that her  aware of everyone in the family, he knows who the president..." and "...is able to go to the bathroom, w/ a walker and assistance from the nursing aide." However, earlier today, the patient's aid witnessed that the pt wasn't moving his R arm. His wife, called her son, who is a Neurologist and indicated to give him an aspirin. The patient was aphasic. CT on admission showed Left MCA stroke. Palliative care was consulted for GOC and PCU eval.         PERTINENT PM/SXH:   History of polymyalgia rheumatica    HTN (hypertension)    BPH (benign prostatic hyperplasia)    Atrial fibrillation      H/O cardiac pacemaker    S/P CABG x 5      FAMILY HISTORY:  No pertinent family history in first degree relatives      ITEMS NOT CHECKED ARE NOT PRESENT    SOCIAL HISTORY:   Significant other/partner[ ]  Children[ ]  Spiritism/Spirituality:  Substance hx:  [ ]   Tobacco hx:  [ ]   Alcohol hx: [ ]   Home Opioid hx:  [ ] I-Stop Reference No:  Living Situation: [ ]Home  [ ]Long term care  [ ]Rehab [ ]Other  Lives with wife and has 12 h HHA.    ADVANCE DIRECTIVES:    DNR  MOLST  [ ]  Living Will  [ ]   DECISION MAKER(s):  [ ] Health Care Proxy(s)  [ x] Surrogate(s)  [ ] Guardian           Name(s): Phone Number(s): Wife     BASELINE (I)ADL(s) (prior to admission):  [place]: [ ]Total  [ ] Moderate [x ]Dependent    Allergies    No Known Allergies    Intolerances    MEDICATIONS  (STANDING):    MEDICATIONS  (PRN):    PRESENT SYMPTOMS: [ x]Unable to obtain due to poor mentation   Source if other than patient:  [ ]Family   [ ]Team     Pain: [x ]yes [ ]no  QOL impact -   Location -                    Aggravating factors -  Quality -  Radiation -  Timing-  Severity (0-10 scale):  Minimal acceptable level (0-10 scale):     CPOT:    https://www.[facility].org/getattachment/[email or web address]-[email or web address]-6053b0671o2c/Critical-Care-Pain-Observation-Tool-(CPOT)      PAIN AD Score: 2    http://geriatrictoolkit.[facility]/cog/painad.pdf (press ctrl +  left click to view)    Dyspnea:                           [ ]Mild [ ]Moderate [ ]Severe  Anxiety:                             [ ]Mild [ ]Moderate [ ]Severe  Fatigue:                             [ ]Mild [ ]Moderate [ ]Severe  Nausea:                             [ ]Mild [ ]Moderate [ ]Severe  Loss of appetite:              [ ]Mild [ ]Moderate [ ]Severe  Constipation:                    [ ]Mild [ ]Moderate [ ]Severe    Other Symptoms:  [ ]All other review of systems negative     Palliative Performance Status Version 2:    20     %    http://[email or web address].org/files/news/palliative_performance_scale_ppsv2.pdf  PHYSICAL EXAM:  Vital Signs Last 24 Hrs  T(C): 36.3 (10 Mar 2021 12:35), Max: 36.3 (10 Mar 2021 12:35)  T(F): 97.3 (10 Mar 2021 12:35), Max: 97.3 (10 Mar 2021 12:35)  HR: 104 (10 Mar 2021 12:35) (104 - 104)  BP: 151/89 (10 Mar 2021 12:35) (151/89 - 151/89)  BP(mean): --  RR: 18 (10 Mar 2021 12:35) (18 - 18)  SpO2: 98% (10 Mar 2021 12:35) (98% - 98%) I&O's Summary    GENERAL:  [ ]Alert  [ ]Oriented x   [x ]Lethargic  [ ]Cachexia  [ ]Unarousable  [ ]Verbal  [ ]Non-Verbal  Behavioral:   [ ] Anxiety  [ ] Delirium [ x] Agitation [ ] Other  HEENT:  [ ]Normal   [ ]Dry mouth   [ ]ET Tube/Trach  [ ]Oral lesions  PULMONARY:   [ ]Clear [ ]Tachypnea  [ ]Audible excessive secretions   [ ]Rhonchi        [ ]Right [ ]Left [ ]Bilateral  [ ]Crackles        [ ]Right [ ]Left [ ]Bilateral  [ ]Wheezing     [ ]Right [ ]Left [ ]Bilateral  [x ]Diminished breath sounds [ ]right [ ]left [ x]bilateral  [x] Gurgling breath sounds   CARDIOVASCULAR:    [ ]Regular [x ]Irregular [ x]Tachy  [ ]Ja [ ]Murmur [ ]Other  GASTROINTESTINAL:  [ x]Soft  [ ]Distended   [ ]+BS  [ ]Non tender [ ]Tender  [ ]PEG [ ]OGT/ NGT  Last BM:   GENITOURINARY:  [ ]Normal [x ] Incontinent   [ ]Oliguria/Anuria   [ ]Delgado  MUSCULOSKELETAL:   [ ]Normal   [x ]Weakness  [ ]Bed/Wheelchair bound [ ]Edema  NEUROLOGIC:   [ ]No focal deficits  [x ]Cognitive impairment  [x ]Dysphagia [ ]Dysarthria [x ]Right jovana-Paresis [ x]Other: Aphasia. Not f/u commands   SKIN:   [x ]Normal    [ ]Rash  [ ]Pressure ulcer(s)       Present on admission [ ]y [ ]n    CRITICAL CARE:  [ ] Shock Present  [ ]Septic [ ]Cardiogenic [ ]Neurologic [ ]Hypovolemic  [ ]  Vasopressors [ ]  Inotropes   [ ]Respiratory failure present [ ]Mechanical ventilation [ ]Non-invasive ventilatory support [ ]High flow    [ ]Acute  [ ]Chronic [ ]Hypoxic  [ ]Hypercarbic [ ]Other  [ ]Other organ failure     LABS:                        10.0   11.16 )-----------( 201      ( 10 Mar 2021 12:53 )             31.7   03-10    140  |  104  |  30<H>  ----------------------------<  94  5.4<H>   |  24  |  0.92    Ca    10.9<H>      10 Mar 2021 12:53    TPro  7.5  /  Alb  3.4  /  TBili  0.3  /  DBili  x   /  AST  55<H>  /  ALT  34  /  AlkPhos  154<H>  03-10  PT/INR - ( 10 Mar 2021 14:27 )   PT: 11.8 sec;   INR: 0.98 ratio         PTT - ( 10 Mar 2021 14:27 )  PTT:25.7 sec      RADIOLOGY & ADDITIONAL STUDIES:  EXAM:  CT PERFUSION W MAPS IC                            PROCEDURE DATE:  03/10/2021    CTA brain:  Lack of flow related enhancement of the left M1 segment, the left MCA bifurcation, and proximal left M2 branches. Decreased flow related enhancement of more distal left MCA branches. Findings are consistent with the presence of intraluminal thrombus or embolus.    No vascular aneurysm. No AVM.    CTA neck:  No flow-limiting stenosis or evidence for arterial dissection.    CT brain perfusion:  Cerebral blood flow less than 30% = 0 mL, therefore no core infarct is predicted.    Tmax greater than 6 seconds = 123 mL and involves much of the left MCA territory. This indicates territory at continued ischemic risk and the presence of neurologic symptoms.    Dr. Sorensen discussed these findings with neurology stroke JONATHAN Rodriguez on 3/10/2021 1:23 PM with read back.                  PEDRO LUIS SORENSEN MD; Attending Radiologist  This document has been electronically signed. Mar 10 2021  1:43PM    < end of copied text >  PROTEIN CALORIE MALNUTRITION PRESENT: [ ]mild [ ]moderate [ ]severe [ ]underweight [ ]morbid obesity  https://www.andeal.org/vault/2440/web/files/ONC/Table_Clinical%20Characteristics%20to%20Document%20Malnutrition-White%20JV%20et%20al%202012.pdf    Height (cm): 182.9 (03-10-21 @ 12:35), 182.9 (12-28-20 @ 11:09), 182.9 (09-02-20 @ 22:15)  Weight (kg): 67.7 (03-10-21 @ 13:22), 77.1 (12-28-20 @ 11:09), 71.3 (09-02-20 @ 22:15)  BMI (kg/m2): 20.2 (03-10-21 @ 13:22), 23 (03-10-21 @ 12:35), 23 (12-28-20 @ 11:09)    [ ]PPSV2 < or = to 30% [ ]significant weight loss  [ ]poor nutritional intake  [ ]anasarca     Albumin, Serum: 3.4 g/dL (03-10-21 @ 12:53)   [ ]Artificial Nutrition      REFERRALS:   [ ]Chaplaincy  [ ]Hospice  [ ]Child Life  [ ]Social Work  [ ]Case management [ ]Holistic Therapy     Goals of Care Document:

## 2021-03-10 NOTE — ED PROVIDER NOTE - PHYSICAL EXAMINATION
I have reviewed the triage vital signs.  Const: ill appearing, eyes open   Head: atraumatic, normocephalic  Eyes: no conjunctival injection and no scleral icterus  ENMT: Atraumatic external nose and ears, dry mucus membranes  Neck: Symmetric, trachea midline,  CVS: +S1/S2, dorsalis pedis/radial pulse 2+ bilaterally  RESP: Unlabored respiratory effort, Clear to auscultation bilaterally  GI: Nontender/Nondistended, soft abdomen  MSK: Extremities w/o deformity or ttp   Skin: Warm, Dry w/ no rashes  Neuro: GCS=15, CNs II-XII grossly intact, motor in all 4 extremities equal, Sensation grossly intact   Psych: Awake, Alert, & Orientedx3;  Appropriate mood and affect, cooperative I have reviewed the triage vital signs.  Const: ill appearing, eyes open   Head: atraumatic, normocephalic  Eyes: no conjunctival injection and no scleral icterus, eyes open spontaneous, pupils are 2mm and nonreactive appears to have L gaze palsy, blinks w/ confrontation   ENMT: Atraumatic external nose and ears, dry mucus membranes, mouth open,   Neck: Symmetric, trachea midline,  CVS: +S1/S2, dorsalis pedis/radial pulse 2+ bilaterally  RESP: Unlabored respiratory effort,  diminished breath sounds bilaterally  GI: Nontender/Nondistended, soft abdomen  Neuro: GCS EV2M4; unable to the move R side, L side able to lift L leg above gravity, L arm able to hold against gravity   Psych: eyes open spontaneously, unable to assess,

## 2021-03-10 NOTE — H&P ADULT - PROBLEM SELECTOR PLAN 2
-Not on AC given previous history of falls and bleeds.   -At home on metoprolol 25mg ER daily. Since NPO, can instead give metoprolol 5mg IV Q6H standing.  -Trops elevated possibly due to demand ischemia Type 2 MI. -Not on AC at home given previous history of falls and bleeds. -Per neuro recs, would hold off on full AC at this time given extent of infarct.   -At home on metoprolol 25mg ER daily. Since NPO, can instead give metoprolol 5mg IV Q6H standing for now.  -Trops elevated possibly due to demand ischemia Type 2 MI.

## 2021-03-10 NOTE — CONSULT NOTE ADULT - PROBLEM SELECTOR RECOMMENDATION 4
Likely 2/2 to CVA over Dementia and unfamiliar environment.   Will advise:   -Frequent reassurance and verbal orientation   -Family members or other familiar persons by his bedside if possible   -Delusions and hallucinations should be neither endorsed nor challenged.   -Physical restraints should be avoided. Alternatives to restraint use, such as constant observation (preferably by someone familiar to the patient such as a family member), may be more effective.  -PT eval and early ambulation if possible  -Move to a room with a window and out of the ED  If severe agitation that is threatening the patient's care and well being and QTC < 500, may consider Zyprexa IM 2.5 mg qd PRN.

## 2021-03-10 NOTE — ED PROVIDER NOTE - OBJECTIVE STATEMENT
89 M w/ afib on eliquis, CABG, PPM, HTN, BPH, and dementia aaox1 per chart documentation, wife states that her  aware of everyone in the family, he knows who the president is and reports that last night he had a dream and was talking a lot this AM when the nursing aide came, she witnessed that the pt wasn't moving his R arm. She states that her son is a physician so she called him who told her to give aspirin. Pt per wife is able to go to the bathroom, w/ a walker and assistance from the nursing aide. 89 M w/ afib on eliquis, CABG, PPM, HTN, BPH, and dementia aaox1 per chart documentation, wife states that her  aware of everyone in the family, he knows who the president is and reports that last night he had a dream and was talking a lot this AM when the nursing aide came, she witnessed that the pt wasn't moving his R arm. She states that her son is a physician so she called him who told her to give aspirin. Pt per wife is able to go to the bathroom, w/ a walker and assistance from the nursing aide. Pt is aphasic  discussion w/ wife regarding ct w/ iv contrast, reports pt w/ no hx of allergic reaction to iv contrast, no kidney problems 89 M w/ afib (Not on eliquis per the wife) CABG, PPM, HTN, BPH, and dementia aaox1 per chart documentation, wife states that her  aware of everyone in the family, he knows who the president is and reports that last night he had a dream and was talking a lot this AM when the nursing aide came, she witnessed that the pt wasn't moving his R arm. She states that her son is a physician so she called him who told her to give aspirin. Pt per wife is able to go to the bathroom, w/ a walker and assistance from the nursing aide. Pt is aphasic  discussion w/ wife regarding ct w/ iv contrast, reports pt w/ no hx of allergic reaction to iv contrast, no kidney problems

## 2021-03-10 NOTE — ED PROVIDER NOTE - CLINICAL SUMMARY MEDICAL DECISION MAKING FREE TEXT BOX
aphasic, unable to move R side, nonverbal non responsive, eyes opening spontaneously, moving L side spontaneously, not following commands, gaze deviation, concern for ICH vs large vessel occlusion vs AMS due to significant lab derangement, though less likely as wife states that pt was in normal state of health yesterday. TBA

## 2021-03-10 NOTE — H&P ADULT - ASSESSMENT
89 year old male with PMH of Afib (Not on eliquis per the wife), CABG, PPM, HTN, BPH, PMR, and dementia aaox1 per chart documentation; brought to [facility] with new right-sided weakness; found to have a left MCA stroke on CT head, patient is aphasic.  89 year old male with PMH of Afib (Not on eliquis per the wife), CABG, PPM, HTN, BPH, PMR, and dementia aaox1 per chart documentation; brought to [facility] with new right-sided weakness; found to have a left MCA stroke on CT head, patient is aphasic. Mild leukocytosis on labs. Palliative discussed with wife and patient is now DNR/DNI. Admitted for possible transfer to home hospice.

## 2021-03-10 NOTE — CONSULT NOTE ADULT - PROBLEM SELECTOR RECOMMENDATION 7
Will continue to f/u for GOC and resources. Not PCU eligible as this time.    Raymundo Cohn MD   Geriatrics and Palliative Care (GAP) Consult Service    of Geriatric and Palliative Medicine  [facility]      Please page the following number for clinical matters between the hours of 9 am and 5 pm from Monday through Friday : [phone]    After 5pm and on weekends, please see the contact information below:    In the event of newly developing, evolving, or worsening symptoms, please contact the Palliative Medicine team via pager (if the patient is at [facility] #[phone] or if the patient is at [facility] #[phone]) The Geriatric and Palliative Medicine service has coverage 24 hours a day/ 7 days a week to provide medical recommendations regarding symptom management needs via telephone

## 2021-03-10 NOTE — CONSULT NOTE ADULT - PROBLEM SELECTOR RECOMMENDATION 3
See GOC above.   Family was thinking about a trial of NGT feeds.   Aspiration precautions. See GOC above.   S&S eval.   Family was thinking about a trial of NGT feeds.   Aspiration precautions.

## 2021-03-10 NOTE — CONSULT NOTE ADULT - ASSESSMENT
Full note to f/u:     GOC: d/w the patient's wife, son (Dr. Arce who is a Neurologist himself), and Dr. Herman about the patient's acute events and current neuro status and his family gave verbalized understanding about it. At this point they would like to provide medical Rx for the patient for at least 48-72 hours in order to better understand his neurologic situation and chances for any degree of improvement or deterioration. They will think about starting tube feeds. His family agree on DNR/I and a MOLST form was completed.     Will add Glyco 0.2 mg IV q 6 PRN for secretions.   Aspiration precautions.   Since current goals are not symptoms driven, a PCU transferring is not indicated.    Will continue to f/u.         Raymundo Cohn MD   Geriatrics and Palliative Care (GAP) Consult Service    of Geriatric and Palliative Medicine  [facility]      Please page the following number for clinical matters between the hours of 9 am and 5 pm from Monday through Friday : [phone]    After 5pm and on weekends, please see the contact information below:    In the event of newly developing, evolving, or worsening symptoms, please contact the Palliative Medicine team via pager (if the patient is at [facility] #[phone] or if the patient is at [facility] #[phone]) The Geriatric and Palliative Medicine service has coverage 24 hours a day/ 7 days a week to provide medical recommendations regarding symptom management needs via telephone  89 M w/ afib (Not on eliquis per the wife) CABG, PPM, HTN, BPH, and dementia aaox1 p/w left sided weakness and aphasia. CT on admission showed Left MCA stroke. Palliative care was consulted for GOC and PCU eval        Will add Glyco 0.2 mg IV q 6 PRN for secretions.   Aspiration precautions.   Since current goals are not symptoms driven, a PCU transferring is not indicated.    Will continue to f/u.

## 2021-03-10 NOTE — H&P ADULT - HISTORY OF PRESENT ILLNESS
89 year old male with PMH of Afib (Not on eliquis per the wife), CABG, PPM, HTN, BPH, PMR, and dementia aaox1 per chart documentation, wife states that her  is usually aware of everyone in the family, he knows who the president is and she reports that last night he had a dream and was talking a lot this AM when the nursing aide came, she witnessed that the pt wasn't moving his R arm. She states that her son is a physician so she called him who told her to give aspirin. Pt per wife is able to go to the bathroom, w/ a walker and assistance from the nursing aide usually on his own. Pt is aphasic.

## 2021-03-10 NOTE — CONSULT NOTE ADULT - CONVERSATION DETAILS
GOC: d/w the patient's wife, son (Dr. Arce who is a Neurologist himself), and Dr. Herman about the patient's acute events and current neuro status and his family gave verbalized understanding about it. At this point they would like to provide medical Rx for the patient for at least 48-72 hours in order to better understand his neurologic situation and chances for any degree of improvement or deterioration. They will think about starting tube feeds. His family agree on DNR/I and a MOLST form was completed.

## 2021-03-10 NOTE — H&P ADULT - ATTENDING COMMENTS
Saul Lock MD  Division of [facility] Medicine  Cell: [phone]  Pager: [phone]  Office: [phone]

## 2021-03-10 NOTE — H&P ADULT - NSHPLABSRESULTS_GEN_ALL_CORE
LABS:                        10.0   11.16 )-----------( 201      ( 10 Mar 2021 12:53 )             31.7       03-10    140  |  104  |  30<H>  ----------------------------<  94  5.4<H>   |  24  |  0.92    Ca    10.9<H>      10 Mar 2021 12:53    TPro  7.5  /  Alb  3.4  /  TBili  0.3  /  DBili  x   /  AST  55<H>  /  ALT  34  /  AlkPhos  154<H>  03-10          CAPILLARY BLOOD GLUCOSE  97 (10 Mar 2021 12:47)      POCT Blood Glucose.: 97 mg/dL (10 Mar 2021 12:38)      PT/INR - ( 10 Mar 2021 14:27 )   PT: 11.8 sec;   INR: 0.98 ratio         PTT - ( 10 Mar 2021 14:27 )  PTT:25.7 sec    Lactate Trend    RADIOLOGY:    c< from: CT Angio Neck w/ IV Cont (03.10.21 @ 13:21) >    CT brain:  New decreased attenuation in the left anterior basal ganglia/subinsular region suspicious for acute infarction.    Increased density in the region of the M1 segment of the left MCA, likely representing intraluminal clot.    No acute intracranial hemorrhage.    CTA brain:  Lack of flow related enhancement of the left M1 segment, the left MCA bifurcation, and proximal left M2 branches. Decreased flow related enhancement of more distal left MCA branches. Findings are consistent with the presence of intraluminal thrombus or embolus.    No vascular aneurysm. No AVM.    CTA neck:  No flow-limiting stenosis or evidence for arterial dissection.    CT brain perfusion:  Cerebral blood flow less than 30% = 0 mL, therefore no core infarct is predicted.    Tmax greater than 6 seconds = 123 mL and involves much of the left MCA territory. This indicates territory at continued ischemic risk and the presence of neurologic symptoms.    < end of copied text >

## 2021-03-10 NOTE — ED PROVIDER NOTE - PROGRESS NOTE DETAILS
neurology concerned for M1 occlusion will speak w/ wife regarding GOC Neurology had GOC conversation with wife. Wife notes patient to be DNR/DNI per patients wishes. Palliative care made aware. Case management aware. Neurology noting patient is not a candidate for TPA or mechanical thrombectomy. Neurology had GOC conversation with wife. Wife notes patient to be DNR/DNI per patients wishes. Palliative care made aware. Case management aware for home hospice at the wishes of patients wife. palliative care at bedside w/ neurology discussing w/ wife POC After discussion with palliative and family patient to be admitted to hospital for the start of hospice. Patient endorsed to hospitalist.

## 2021-03-10 NOTE — H&P ADULT - PROBLEM SELECTOR PLAN 1
-Presents with right-sided weakness and aphasia. CT head shows left MCA thrombus or embolus. -S/p stroke code. Limited options.   -Palliative and neuro discussed with patient's wife. -Will take a conservative approach. -Patient now DNR/DNI. Will start looking in to home hospice.  -Keep NPO for now pending dysphagia screen given risk of aspiration. Wife doesn't not want pleasure feeds or tube feeds at this time. -Will give gentle IVF's.  -Will check UA and urine culture given leukocytosis on admission in case some degree of his mental status changes may be reversible.   -IVF's will also help with hypercalcemia noted on labs. -Hyperkalemia was due to hemolysis of lab sample. -Presents with right-sided weakness and aphasia. CT head shows left MCA thrombus or embolus. -S/p stroke code. Limited options.   -Palliative and neuro discussed with patient's wife. -Will take a conservative approach. -Patient now DNR/DNI. Will start looking in to home hospice.  -Keep NPO for now pending dysphagia screen given risk of aspiration. Wife doesn't not want pleasure feeds or tube feeds (no NGT) at this time. -Will give gentle IVF's.  -Will check UA and urine culture given leukocytosis on admission in case some degree of his mental status changes may be reversible. Consider trial of abx if UA positive.   -IVF's will also help with hypercalcemia noted on labs. -Hyperkalemia was due to hemolysis of lab sample.  -Neuro recs appreciated.   -PT/OT/swallow eval. Neuro checks.

## 2021-03-10 NOTE — ED ADULT NURSE NOTE - BEFAST SPEECH PHRASE
From: Araceli Lara  To: Shola Garza MD  Sent: 7/9/2019 1:39 PM CDT  Subject: Non-Urgent Medical Question    When I was first prescribed medication for my hypothyroid I remember that I had a negative reaction and side effects to what I was first prescribed so I now am taking the name brand Levoxyl. Was I first prescribed levothyroxine or synthyroid? I changed insurance and they are now refusing to cover Levoxyl and want me to take synthyroid but I can't remember if that is the medication I had a reaction to or not.    Araceli   No

## 2021-03-10 NOTE — ED ADULT TRIAGE NOTE - CHIEF COMPLAINT QUOTE
r. sided facial droop with r. sided weakness and paralysis x 11 pm last night  aide this am found pt unresponsive, responsive to stimuli  no current thinners, was on eliquis that was d/c  BE FAST positive for noted R. sided facial droop and r. sided paralysis.

## 2021-03-10 NOTE — CONSULT NOTE ADULT - PARTICIPANTS
Due for labs today 12/1/2020. Called [facility] and spoke with Cora to see when lab work is being drawn today.    She stated she did not see orders for this. This was ordered by Dr. Finnegan on 11/17/20 to repeat NT BNP BMP and Dig level in 2 weeks, due around 12/1/20.     Will call us back to let us know if this was done or not.    Awaiting call back.   Family/Staff

## 2021-03-10 NOTE — H&P ADULT - PROBLEM SELECTOR PLAN 5
-Hold home finasteride and tamsulosin for now given NPO status.  -Monitor UO for any signs of retention.

## 2021-03-11 NOTE — CONSULT NOTE ADULT - ASSESSMENT
89 yo M pt w/ b/l heel wounds  - pt seen and evaluated  - R heel fibrous wound measuring 2x3 cm down to bone w/ no drainage and no signs of infection; L heel fibrous wound mearuing 4x3 down to bone w/ no drainage and no signs of active infection  - ordered b/l foot xrays, esr, crp, santyl  - pod plan local wound care for now  - discussed w/ attending

## 2021-03-11 NOTE — PROGRESS NOTE ADULT - ASSESSMENT
89 M w/ afib (Not on eliquis per the wife) CABG, PPM, HTN, BPH, and dementia aaox1 p/w left sided weakness and aphasia. CT on admission showed Left MCA stroke. Palliative care was consulted for GOC and PCU eval      Full note to f/u     S&S is recommending NPO. Met with his wife that indicated based on her son's inputs, they will be waiting for 3 days to see if the patient's condition improves, if not, she will be open to re discussing about hospice; however, she now feels as the patient will be better served in an inpatient facility.     Will continue to discuss about GOC.          89 M w/ afib (Not on eliquis per the wife) CABG, PPM, HTN, BPH, and dementia aaox1 p/w left sided weakness and aphasia. CT on admission showed Left MCA stroke. Palliative care was consulted for GOC and PCU eval

## 2021-03-11 NOTE — CONSULT NOTE ADULT - ATTENDING COMMENTS
above noted   Non responsive 89 yo male , with no overt ischemia of either leg or foot , with bilateral heel wounds as noted  complete cair boots advised for offloading
code stroke called and neurology emergently assessed patient. 89 yo M with Afib no on AC 2/2 fall, s/p PPM, CAD/CABG, dementia preMRS=4/5 p/w R HP and speech changes.  CTH/A/P with L M1 occlusion core 0 and penumbra of 123cc was out of tpa window. not a MT candidate given preMRS.   - family decided on palliative. DNR/I, hospice eval   - hold AC  - if in agreement with family wishes would give ASA/statin and hypertonics to avoid shift.   Eduardo Ugarte MD  Vascular Neurology

## 2021-03-11 NOTE — CONSULT NOTE ADULT - SUBJECTIVE AND OBJECTIVE BOX
Wound Surgery Consult Note:    HPI:  89 year old male with PMH of Afib (Not on eliquis per the wife), CABG, PPM, HTN, BPH, PMR, and dementia aaox1 per chart documentation, wife states that her  is usually aware of everyone in the family, he knows who the president is and she reports that last night he had a dream and was talking a lot this AM when the nursing aide came, she witnessed that the pt wasn't moving his R arm. She states that her son is a physician so she called him who told her to give aspirin. Pt per wife is able to go to the bathroom, w/ a walker and assistance from the nursing aide usually on his own. Pt is aphasic. (10 Mar 2021 17:06)    Request for wound care consult for bilateral heel wounds received. Mr. Arce was encountered on an alternating air with low air loss surface. He was nonverbal and constantly moving in bed. He appeared confused and would not keep his hospital gown on. He was seen with Dr. Deng and recommendations and plan with clinical nurse.    PAST MEDICAL & SURGICAL HISTORY:  Hyperlipidemia  Dementia  History of polymyalgia rheumatica  this is why patient is on chronic prednisone  HTN (hypertension)  BPH (benign prostatic hyperplasia)  Atrial fibrillation  H/O cardiac pacemaker  S/P CABG x 5    REVIEW OF SYSTEMS  Unable to obtain due to patient's condition    MEDICATIONS  (STANDING):  dextrose 5% + sodium chloride 0.45%. 1000 milliLiter(s) (50 mL/Hr) IV Continuous <Continuous>  enoxaparin Injectable 40 milliGRAM(s) SubCutaneous daily  metoprolol tartrate Injectable 5 milliGRAM(s) IV Push every 6 hours    MEDICATIONS  (PRN):  acetaminophen   Tablet .. 650 milliGRAM(s) Oral every 6 hours PRN Temp greater or equal to 38C (100.4F), Mild Pain (1 - 3), Moderate Pain (4 - 6)  glycopyrrolate Injectable 0.2 milliGRAM(s) IV Push every 6 hours PRN Oropharyngeal secretions    Allergies    No Known Allergies    Intolerances    SOCIAL HISTORY:  , lives with wife    FAMILY HISTORY:  No pertinent family history in first degree relatives    Vital Signs Last 24 Hrs  T(C): 36.6 (11 Mar 2021 07:53), Max: 37 (10 Mar 2021 23:08)  T(F): 97.9 (11 Mar 2021 07:53), Max: 98.6 (10 Mar 2021 23:08)  HR: 95 (11 Mar 2021 07:53) (88 - 104)  BP: 128/80 (11 Mar 2021 07:53) (128/80 - 195/72)  BP(mean): 100 (10 Mar 2021 20:46) (100 - 100)  RR: 19 (11 Mar 2021 07:53) (14 - 19)  SpO2: 97% (11 Mar 2021 07:53) (96% - 100%)    Physical Exam:  General: Confused, thin, frail  Respiratory: no SOB on room air  Gastrointestinal: soft NT/ND, umbilical hernia  Neurology: nonverbal, not following commands  Musculoskeletal: no contractures  Vascular: no BLE edema, DP/PT pulses not manually palpable, BLE equally warm, no acute ischemia noted  Skin:  Left heel ulcer - L3cm X W 2.2cm x D 0.2cm - central white fibrinous tissue surrounded by pink tissue, small serosanguinous drainage  Right heel ulcer - L 2cm x W 2cm x D 0.1cm - appears to be residual of deep tissue injury, scant drainage, No odor, erythema, increased warmth, tenderness, induration, fluctuance  Sacral, bilateral buttocks skin intact with slight blanchable erythema    LABS:  03-10    140  |  104  |  30<H>  ----------------------------<  94  5.4<H>   |  24  |  0.92    Ca    10.9<H>      10 Mar 2021 12:53    TPro  7.5  /  Alb  3.4  /  TBili  0.3  /  DBili  x   /  AST  55<H>  /  ALT  34  /  AlkPhos  154<H>  03-10                          10.0   11.16 )-----------( 201      ( 10 Mar 2021 12:53 )             31.7     PT/INR - ( 10 Mar 2021 14:27 )   PT: 11.8 sec;   INR: 0.98 ratio         PTT - ( 10 Mar 2021 14:27 )  PTT:25.7 sec  Urinalysis Basic - ( 11 Mar 2021 04:49 )    Color: Yellow / Appearance: Slightly Turbid / S.032 / pH: x  Gluc: x / Ketone: Negative  / Bili: Negative / Urobili: Negative   Blood: x / Protein: 30 mg/dL / Nitrite: Negative   Leuk Esterase: Large / RBC: 61 /hpf /  /HPF   Sq Epi: x / Non Sq Epi: 0 /hpf / Bacteria: Many

## 2021-03-11 NOTE — PROGRESS NOTE ADULT - PROBLEM SELECTOR PLAN 9
-Palliative team discussed with patient's wife. -Will take a conservative approach. -Patient DNR/DNI - MOLST in chart. F/U hospice referral placed. -Palliative f/u.   -Wife wants some IVF's. But doesn't want blood draws. She wants to keep him NPO for now given risk of aspiration, but doesn't want pleasure feeds or tube feedings (no NGT) at this time. Ok with holding non-essential medications. Considering home hospice, knows prognosis guarded.

## 2021-03-11 NOTE — PROGRESS NOTE ADULT - PROBLEM SELECTOR PLAN 6
-Unclear if patient was still on prednisone at home. Needs to be clarified. Will hold off on any steroids at this time regardless. -Hold home finasteride and tamsulosin for now given NPO status.  -Monitor UO for any signs of retention. Strict Is/Os  -Bladder scan top monitor for retention

## 2021-03-11 NOTE — PROGRESS NOTE ADULT - PROBLEM SELECTOR PLAN 4
-Hold home statin given NPO status. -Will hold home donepezil and memantine given NPO status. Limited benefit at this point.   -Frequent reorientation as possible.   -Per wife, patient is usually not agitated at home.   -Aspiration and fall precautions.

## 2021-03-11 NOTE — PROGRESS NOTE ADULT - PROBLEM SELECTOR PLAN 1
Not a candidate for TPA no plans for any other acute interventions.   GOC as per my GOC note dated 3/10.

## 2021-03-11 NOTE — DISCHARGE NOTE PROVIDER - NSDCMRMEDTOKEN_GEN_ALL_CORE_FT
acetaminophen 325 mg oral tablet: 2 tab(s) orally every 6 hours, As needed, Mild Pain (1 - 3)  calcium carbonate 500 mg (200 mg elemental calcium) oral tablet, chewable: 1 tab(s) orally every 4 hours, As needed, Heartburn  cholecalciferol oral tablet: 1000 unit(s) orally once a day  finasteride 5 mg oral tablet: 1 tab(s) orally once a day  lidocaine 5% topical film: Apply topically to affected area once a day  memantine 10 mg oral tablet: 1 tab(s) orally 2 times a day  metoprolol succinate 25 mg oral tablet, extended release: 1 tab(s) orally once a day  predniSONE 1 mg oral tablet: 1 tab(s) orally in AM and 2 tabs orally in PM  rosuvastatin 20 mg oral tablet: 1 tab(s) orally once a day (at bedtime)  tamsulosin 0.4 mg oral capsule: 1 cap(s) orally once a day (at bedtime)   acetaminophen 325 mg oral tablet: 2 tab(s) orally every 6 hours, As needed, Temp greater or equal to 38C (100.4F), Mild Pain (1 - 3), Moderate Pain (4 - 6)  cadexomer iodine 0.9% topical gel: 1 application topically 2 times a day  collagenase 250 units/g topical ointment: 1 application topically once a day  Dextrose 5% with 0.45% NaCl intravenous solution: 1000 milliliter(s) intravenous   glycopyrrolate 0.2 mg/mL injectable solution: 0.2 milligram(s) intravenous every 8 hours  morphine: 2 milligram(s) intravenous every 2 hours, As Needed  ocular lubricant ophthalmic solution: 1 drop(s) to each affected eye 3 times a day

## 2021-03-11 NOTE — OCCUPATIONAL THERAPY INITIAL EVALUATION ADULT - PRECAUTIONS/LIMITATIONS, REHAB EVAL
Pt states her son is a physician so she called him who told her to give aspirin. Pt per wife is able to go to the bathroom, w/ a walker and assistance from the nursing aide usually on his own. Pt is aphasic./fall precautions

## 2021-03-11 NOTE — PROGRESS NOTE ADULT - ASSESSMENT
89 year old male with PMH of Afib (Not on eliquis per the wife), CABG, PPM, HTN, BPH, PMR, and dementia aaox1 per chart documentation; brought to [facility] with new right-sided weakness; found to have a left MCA stroke on CT head, patient is aphasic. Mild leukocytosis on labs. Palliative discussed with wife and patient is now DNR/DNI. Admitted for possible transfer to home hospice.

## 2021-03-11 NOTE — SWALLOW BEDSIDE ASSESSMENT ADULT - SLP GENERAL OBSERVATIONS
Pt encountered bedside, AA&OxO, minimally able to arouse. Pt opened eyes briefly. Pt unable to follow simple commands or answer simple yes/no questions. Pt remained non-verbal throughout evaluation. Occasional wet and weak throat clear which may be suggestive of poor management of secretions. Extensive oral care provided and suctioning via [place]. Pt left in NAD, RN made aware of findings.

## 2021-03-11 NOTE — PROGRESS NOTE ADULT - PROBLEM SELECTOR PLAN 1
-Presents with right-sided weakness and aphasia. CT head shows left MCA thrombus or embolus. -S/p stroke code. Limited options.   -Palliative and neuro discussed with patient's wife. -Will take a conservative approach. -Patient now DNR/DNI. Will start looking in to home hospice.  -Keep NPO for now pending dysphagia screen given risk of aspiration. Wife doesn't not want pleasure feeds or tube feeds (no NGT) at this time. -Will give gentle IVF's.  -Will check UA and urine culture given leukocytosis on admission in case some degree of his mental status changes may be reversible. Consider trial of abx if UA positive.   -IVF's will also help with hypercalcemia noted on labs. -Hyperkalemia was due to hemolysis of lab sample.  -Neuro recs appreciated.   -PT/OT/swallow eval. Neuro checks. -Presents with right-sided weakness and aphasia. CT head shows left MCA thrombus or embolus. -S/p stroke code. Limited options.   -Palliative and neuro discussed with patient's wife. -Will take a conservative approach. -Patient DNR/DNI. Hospice referral  -Keep NPO. Failed SS dysphagia screen. Wife doesn't not want pleasure feeds or tube feeds (no NGT) at this time. -Continue gentle IVF's.  -IVF's will also help with hypercalcemia noted on labs.   -Neuro recs appreciated.   -Neuro checks.

## 2021-03-11 NOTE — PROGRESS NOTE ADULT - PROBLEM SELECTOR PROBLEM 4
Hyperlipidemia, unspecified hyperlipidemia type Dementia without behavioral disturbance, unspecified dementia type

## 2021-03-11 NOTE — PROGRESS NOTE ADULT - SUBJECTIVE AND OBJECTIVE BOX
HPI:  89 M w/ afib (Not on eliquis per the wife) CABG, PPM, HTN, BPH, and dementia aaox1. Per EMR, "wife states that her  aware of everyone in the family, he knows who the president..." and "...is able to go to the bathroom, w/ a walker and assistance from the nursing aide." However, earlier today, the patient's aid witnessed that the pt wasn't moving his R arm. His wife, called her son, who is a Neurologist and indicated to give him an aspirin. The patient was aphasic. CT on admission showed Left MCA stroke. Palliative care was consulted for GOC and PCU eval.     3/11 Patient was lethargic and still not able to f/u commands. Neuro deficit is persistent         PERTINENT PM/SXH:   History of polymyalgia rheumatica    HTN (hypertension)    BPH (benign prostatic hyperplasia)    Atrial fibrillation      H/O cardiac pacemaker    S/P CABG x 5      FAMILY HISTORY:  No pertinent family history in first degree relatives    MEDICATIONS  (STANDING):  cadexomer iodine 0.9% Gel 1 Application(s) Topical two times a day  cefTRIAXone   IVPB 1000 milliGRAM(s) IV Intermittent every 24 hours  collagenase Ointment 1 Application(s) Topical daily  dextrose 5% + sodium chloride 0.45%. 1000 milliLiter(s) (50 mL/Hr) IV Continuous <Continuous>  enoxaparin Injectable 40 milliGRAM(s) SubCutaneous daily  metoprolol tartrate Injectable 5 milliGRAM(s) IV Push every 6 hours    MEDICATIONS  (PRN):  acetaminophen   Tablet .. 650 milliGRAM(s) Oral every 6 hours PRN Temp greater or equal to 38C (100.4F), Mild Pain (1 - 3), Moderate Pain (4 - 6)  glycopyrrolate Injectable 0.2 milliGRAM(s) IV Push every 6 hours PRN Oropharyngeal secretions    ITEMS NOT CHECKED ARE NOT PRESENT    SOCIAL HISTORY:   Significant other/partner[ ]  Children[ ]  Jain/Spirituality:  Substance hx:  [ ]   Tobacco hx:  [ ]   Alcohol hx: [ ]   Home Opioid hx:  [ ] I-Stop Reference No:  Living Situation: [ ]Home  [ ]Long term care  [ ]Rehab [ ]Other  Lives with wife and has 12 h HHA.    ADVANCE DIRECTIVES:    DNR  MOLST  [ ]  Living Will  [ ]   DECISION MAKER(s):  [ ] Health Care Proxy(s)  [ x] Surrogate(s)  [ ] Guardian           Name(s): Phone Number(s): Wife     BASELINE (I)ADL(s) (prior to admission):  [place]: [ ]Total  [ ] Moderate [x ]Dependent    Allergies    No Known Allergies    Intolerances    MEDICATIONS  (STANDING):    MEDICATIONS  (PRN):    PRESENT SYMPTOMS: [ x]Unable to obtain due to poor mentation   Source if other than patient:  [ ]Family   [ ]Team     Pain: [ ]yes [x ]no  QOL impact -   Location -                    Aggravating factors -  Quality -  Radiation -  Timing-  Severity (0-10 scale):  Minimal acceptable level (0-10 scale):     CPOT:    https://www.[facility].org/getattachment/[email or web address]/Critical-Care-Pain-Observation-Tool-(CPOT)      PAIN AD Score: 0    http://geriatrictoolkit.[facility]/cog/painad.pdf (press ctrl +  left click to view)    Dyspnea:                           [ ]Mild [ ]Moderate [ ]Severe  Anxiety:                             [ ]Mild [ ]Moderate [ ]Severe  Fatigue:                             [ ]Mild [ ]Moderate [ ]Severe  Nausea:                             [ ]Mild [ ]Moderate [ ]Severe  Loss of appetite:              [ ]Mild [ ]Moderate [ ]Severe  Constipation:                    [ ]Mild [ ]Moderate [ ]Severe    Other Symptoms:  [ ]All other review of systems negative     Palliative Performance Status Version 2:    20     %    http://[facility].org/files/news/palliative_performance_scale_[email or web address].pdf  PHYSICAL EXAM:  Vital Signs Last 24 Hrs  T(C): 37 (11 Mar 2021 16:57), Max: 37 (10 Mar 2021 23:08)  T(F): 98.6 (11 Mar 2021 16:57), Max: 98.6 (10 Mar 2021 23:08)  HR: 74 (11 Mar 2021 16:57) (74 - 102)  BP: 121/60 (11 Mar 2021 16:57) (121/60 - 195/72)  BP(mean): 100 (10 Mar 2021 20:46) (100 - 100)  RR: 18 (11 Mar 2021 16:57) (16 - 19)  SpO2: 92% (11 Mar 2021 16:57) (92% - 100%)    GENERAL:  [ ]Alert  [ ]Oriented x   [x ]Lethargic  [ ]Cachexia  [ ]Unarousable  [ ]Verbal  [ ]Non-Verbal  Behavioral:   [ ] Anxiety  [ ] Delirium [ x] Agitation [ ] Other  HEENT:  [ ]Normal   [ ]Dry mouth   [ ]ET Tube/Trach  [ ]Oral lesions  PULMONARY:   [ ]Clear [ ]Tachypnea  [ ]Audible excessive secretions   [ ]Rhonchi        [ ]Right [ ]Left [ ]Bilateral  [ ]Crackles        [ ]Right [ ]Left [ ]Bilateral  [ ]Wheezing     [ ]Right [ ]Left [ ]Bilateral  [x ]Diminished breath sounds [ ]right [ ]left [ x]bilateral  [x] Gurgling breath sounds   CARDIOVASCULAR:    [ ]Regular [x ]Irregular [ x]Tachy  [ ]Ja [ ]Murmur [ ]Other  GASTROINTESTINAL:  [ x]Soft  [ ]Distended   [ ]+BS  [ ]Non tender [ ]Tender  [ ]PEG [ ]OGT/ NGT  Last BM:   GENITOURINARY:  [ ]Normal [x ] Incontinent   [ ]Oliguria/Anuria   [ ]Delgado  MUSCULOSKELETAL:   [ ]Normal   [x ]Weakness  [ ]Bed/Wheelchair bound [ ]Edema  NEUROLOGIC:   [ ]No focal deficits  [x ]Cognitive impairment  [x ]Dysphagia [ ]Dysarthria [x ]Right jovana-Paresis [ x]Other: Aphasia. Not f/u commands   SKIN:   [x ]Normal    [ ]Rash  [ ]Pressure ulcer(s)       Present on admission [ ]y [ ]n    CRITICAL CARE:  [ ] Shock Present  [ ]Septic [ ]Cardiogenic [ ]Neurologic [ ]Hypovolemic  [ ]  Vasopressors [ ]  Inotropes   [ ]Respiratory failure present [ ]Mechanical ventilation [ ]Non-invasive ventilatory support [ ]High flow    [ ]Acute  [ ]Chronic [ ]Hypoxic  [ ]Hypercarbic [ ]Other  [ ]Other organ failure     LABS:                              10.0   11.16 )-----------( 201      ( 10 Mar 2021 12:53 )             31.7   03-10    140  |  104  |  30<H>  ----------------------------<  94  5.4<H>   |  24  |  0.92    Ca    10.9<H>      10 Mar 2021 12:53    TPro  7.5  /  Alb  3.4  /  TBili  0.3  /  DBili  x   /  AST  55<H>  /  ALT  34  /  AlkPhos  154<H>  03-10        RADIOLOGY & ADDITIONAL STUDIES:  EXAM:  CT PERFUSION W MAPS IC                            PROCEDURE DATE:  03/10/2021    CTA brain:  Lack of flow related enhancement of the left M1 segment, the left MCA bifurcation, and proximal left M2 branches. Decreased flow related enhancement of more distal left MCA branches. Findings are consistent with the presence of intraluminal thrombus or embolus.    No vascular aneurysm. No AVM.    CTA neck:  No flow-limiting stenosis or evidence for arterial dissection.    CT brain perfusion:  Cerebral blood flow less than 30% = 0 mL, therefore no core infarct is predicted.    Tmax greater than 6 seconds = 123 mL and involves much of the left MCA territory. This indicates territory at continued ischemic risk and the presence of neurologic symptoms.    Dr. Sorensen discussed these findings with neurology stroke PA Jennifer on 3/10/2021 1:23 PM with read back.                  PEDRO LUIS SORENSEN MD; Attending Radiologist  This document has been electronically signed. Mar 10 2021  1:43PM    < end of copied text >  PROTEIN CALORIE MALNUTRITION PRESENT: [ ]mild [ ]moderate [ ]severe [ ]underweight [ ]morbid obesity  https://www.andeal.org/vault/2440/web/files/ONC/Table_Clinical%20Characteristics%20to%20Document%20Malnutrition-White%20JV%20et%20al%202012.pdf    Height (cm): 182.9 (03-10-21 @ 12:35), 182.9 (12-28-20 @ 11:09), 182.9 (09-02-20 @ 22:15)  Weight (kg): 67.7 (03-10-21 @ 13:22), 77.1 (12-28-20 @ 11:09), 71.3 (09-02-20 @ 22:15)  BMI (kg/m2): 20.2 (03-10-21 @ 13:22), 23 (03-10-21 @ 12:35), 23 (12-28-20 @ 11:09)    [ ]PPSV2 < or = to 30% [ ]significant weight loss  [ ]poor nutritional intake  [ ]anasarca     Albumin, Serum: 3.4 g/dL (03-10-21 @ 12:53)   [ ]Artificial Nutrition      REFERRALS:   [ ]Chaplaincy  [ ]Hospice  [ ]Child Life  [ ]Social Work  [ ]Case management [ ]Holistic Therapy     Goals of Care Document:  HPI:  89 M w/ afib (Not on eliquis per the wife) CABG, PPM, HTN, BPH, and dementia aaox1. Per EMR, "wife states that her  aware of everyone in the family, he knows who the president..." and "...is able to go to the bathroom, w/ a walker and assistance from the nursing aide." However, earlier today, the patient's aid witnessed that the pt wasn't moving his R arm. His wife, called her son, who is a Neurologist and indicated to give him an aspirin. The patient was aphasic. CT on admission showed Left MCA stroke. Palliative care was consulted for GOC and PCU eval.     3/11 Patient was lethargic and still not able to f/u commands. Neuro deficit is persistent         PERTINENT PM/SXH:   History of polymyalgia rheumatica    HTN (hypertension)    BPH (benign prostatic hyperplasia)    Atrial fibrillation      H/O cardiac pacemaker    S/P CABG x 5      FAMILY HISTORY:  No pertinent family history in first degree relatives    MEDICATIONS  (STANDING):  cadexomer iodine 0.9% Gel 1 Application(s) Topical two times a day  cefTRIAXone   IVPB 1000 milliGRAM(s) IV Intermittent every 24 hours  collagenase Ointment 1 Application(s) Topical daily  dextrose 5% + sodium chloride 0.45%. 1000 milliLiter(s) (50 mL/Hr) IV Continuous <Continuous>  enoxaparin Injectable 40 milliGRAM(s) SubCutaneous daily  metoprolol tartrate Injectable 5 milliGRAM(s) IV Push every 6 hours    MEDICATIONS  (PRN):  acetaminophen   Tablet .. 650 milliGRAM(s) Oral every 6 hours PRN Temp greater or equal to 38C (100.4F), Mild Pain (1 - 3), Moderate Pain (4 - 6)  glycopyrrolate Injectable 0.2 milliGRAM(s) IV Push every 6 hours PRN Oropharyngeal secretions    ITEMS NOT CHECKED ARE NOT PRESENT    SOCIAL HISTORY:   Significant other/partner[ x]   Children[x ]  Bahai/Spirituality:  Substance hx:  [ ]   Tobacco hx:  [ ]   Alcohol hx: [ ]   Home Opioid hx:  [ ] I-Stop Reference No:  Living Situation: [ x]Home  [ ]Long term care  [ ]Rehab [ ]Other  Lives with wife and has 12 h HHA.    ADVANCE DIRECTIVES:    DNR  MOLST  [x ]  Living Will  [ ]   DECISION MAKER(s):  [ ] Health Care Proxy(s)  [ x] Surrogate(s)  [ ] Guardian           Name(s): Phone Number(s): Wife     BASELINE (I)ADL(s) (prior to admission):  [place]: [ ]Total  [ ] Moderate [x ]Dependent    Allergies    No Known Allergies    Intolerances    MEDICATIONS  (STANDING):    MEDICATIONS  (PRN):    PRESENT SYMPTOMS: [ x]Unable to obtain due to poor mentation   Source if other than patient:  [ ]Family   [ ]Team     Pain: [ ]yes [x ]no  QOL impact -   Location -                    Aggravating factors -  Quality -  Radiation -  Timing-  Severity (0-10 scale):  Minimal acceptable level (0-10 scale):     CPOT:    https://www.[facility].org/getattachment/[email or web address]/Critical-Care-Pain-Observation-Tool-(CPOT)      PAIN AD Score: 0    http://geriatrictoolkit.[facility]/cog/painad.pdf (press ctrl +  left click to view)    Dyspnea:                           [ ]Mild [ ]Moderate [ ]Severe  Anxiety:                             [ ]Mild [ ]Moderate [ ]Severe  Fatigue:                             [ ]Mild [ ]Moderate [ ]Severe  Nausea:                             [ ]Mild [ ]Moderate [ ]Severe  Loss of appetite:              [ ]Mild [ ]Moderate [ ]Severe  Constipation:                    [ ]Mild [ ]Moderate [ ]Severe    Other Symptoms:  [ ]All other review of systems negative     Palliative Performance Status Version 2:    20     %    http://[facility].org/files/news/palliative_performance_scale_[email or web address].pdf  PHYSICAL EXAM:  Vital Signs Last 24 Hrs  T(C): 37 (11 Mar 2021 16:57), Max: 37 (10 Mar 2021 23:08)  T(F): 98.6 (11 Mar 2021 16:57), Max: 98.6 (10 Mar 2021 23:08)  HR: 74 (11 Mar 2021 16:57) (74 - 102)  BP: 121/60 (11 Mar 2021 16:57) (121/60 - 195/72)  BP(mean): 100 (10 Mar 2021 20:46) (100 - 100)  RR: 18 (11 Mar 2021 16:57) (16 - 19)  SpO2: 92% (11 Mar 2021 16:57) (92% - 100%)    GENERAL:  [ ]Alert  [ ]Oriented x   [x ]Lethargic  [ ]Cachexia  [ ]Unarousable  [ ]Verbal  [ ]Non-Verbal  Behavioral:   [ ] Anxiety  [ ] Delirium [ ] Agitation [ ] Other  HEENT:  [ ]Normal   [ ]Dry mouth   [ ]ET Tube/Trach  [ ]Oral lesions  PULMONARY:   [ ]Clear [ ]Tachypnea  [ ]Audible excessive secretions   [ ]Rhonchi        [ ]Right [ ]Left [ ]Bilateral  [ ]Crackles        [ ]Right [ ]Left [ ]Bilateral  [ ]Wheezing     [ ]Right [ ]Left [ ]Bilateral  [x ]Diminished breath sounds [ ]right [ ]left [ x]bilateral  [ ] Gurgling breath sounds   CARDIOVASCULAR:    [ ]Regular [x ]Irregular [ x]Tachy  [ ]Ja [ ]Murmur [ ]Other  GASTROINTESTINAL:  [ x]Soft  [ ]Distended   [ ]+BS  [ ]Non tender [ ]Tender  [ ]PEG [ ]OGT/ NGT  Last BM:   GENITOURINARY:  [ ]Normal [x ] Incontinent   [ ]Oliguria/Anuria   [ ]Delgado  MUSCULOSKELETAL:   [ ]Normal   [x ]Weakness  [ ]Bed/Wheelchair bound [ ]Edema  NEUROLOGIC:   [ ]No focal deficits  [x ]Cognitive impairment  [x ]Dysphagia [ ]Dysarthria [x ]Right jovana-Paresis [ x]Other: Aphasia. Not f/u commands   SKIN:   [x ]Normal    [ ]Rash  [ ]Pressure ulcer(s)       Present on admission [ ]y [ ]n    CRITICAL CARE:  [ ] Shock Present  [ ]Septic [ ]Cardiogenic [ ]Neurologic [ ]Hypovolemic  [ ]  Vasopressors [ ]  Inotropes   [ ]Respiratory failure present [ ]Mechanical ventilation [ ]Non-invasive ventilatory support [ ]High flow    [ ]Acute  [ ]Chronic [ ]Hypoxic  [ ]Hypercarbic [ ]Other  [ ]Other organ failure     LABS:                              10.0   11.16 )-----------( 201      ( 10 Mar 2021 12:53 )             31.7   03-10    140  |  104  |  30<H>  ----------------------------<  94  5.4<H>   |  24  |  0.92    Ca    10.9<H>      10 Mar 2021 12:53    TPro  7.5  /  Alb  3.4  /  TBili  0.3  /  DBili  x   /  AST  55<H>  /  ALT  34  /  AlkPhos  154<H>  03-10        RADIOLOGY & ADDITIONAL STUDIES:  EXAM:  CT PERFUSION W MAPS IC                            PROCEDURE DATE:  03/10/2021    CTA brain:  Lack of flow related enhancement of the left M1 segment, the left MCA bifurcation, and proximal left M2 branches. Decreased flow related enhancement of more distal left MCA branches. Findings are consistent with the presence of intraluminal thrombus or embolus.    No vascular aneurysm. No AVM.    CTA neck:  No flow-limiting stenosis or evidence for arterial dissection.    CT brain perfusion:  Cerebral blood flow less than 30% = 0 mL, therefore no core infarct is predicted.    Tmax greater than 6 seconds = 123 mL and involves much of the left MCA territory. This indicates territory at continued ischemic risk and the presence of neurologic symptoms.    Dr. Sorensen discussed these findings with neurology stroke PA Jennifer on 3/10/2021 1:23 PM with read back.                  PEDRO LUIS SORENSEN MD; Attending Radiologist  This document has been electronically signed. Mar 10 2021  1:43PM    < end of copied text >  PROTEIN CALORIE MALNUTRITION PRESENT: [ ]mild [ ]moderate [ ]severe [ ]underweight [ ]morbid obesity  https://www.andeal.org/vault/2440/web/files/ONC/Table_Clinical%20Characteristics%20to%20Document%20Malnutrition-White%20JV%20et%20al%202012.pdf    Height (cm): 182.9 (03-10-21 @ 12:35), 182.9 (12-28-20 @ 11:09), 182.9 (09-02-20 @ 22:15)  Weight (kg): 67.7 (03-10-21 @ 13:22), 77.1 (12-28-20 @ 11:09), 71.3 (09-02-20 @ 22:15)  BMI (kg/m2): 20.2 (03-10-21 @ 13:22), 23 (03-10-21 @ 12:35), 23 (12-28-20 @ 11:09)    [ ]PPSV2 < or = to 30% [ ]significant weight loss  [ ]poor nutritional intake  [ ]anasarca     Albumin, Serum: 3.4 g/dL (03-10-21 @ 12:53)   [ ]Artificial Nutrition      REFERRALS:   [ ]Chaplaincy  [ ]Hospice  [ ]Child Life  [ ]Social Work  [ ]Case management [ ]Holistic Therapy     Goals of Care Document:

## 2021-03-11 NOTE — DISCHARGE NOTE NURSING/CASE MANAGEMENT/SOCIAL WORK - PATIENT PORTAL LINK FT
You can access the FollowMyHealth Patient Portal offered by [facility] by registering at the following website: [email or web address]. By joining [employer]s FollowMyHealth portal, you will also be able to view your health information using other applications (apps) compatible with our system.

## 2021-03-11 NOTE — PROGRESS NOTE ADULT - PROBLEM SELECTOR PLAN 2
-Not on AC at home given previous history of falls and bleeds. -Per neuro recs, would hold off on full AC at this time given extent of infarct.   -At home on metoprolol 25mg ER daily. Since NPO, can instead give metoprolol 5mg IV Q6H standing for now.  -Trops elevated possibly due to demand ischemia Type 2 MI. Leukocytosis  UA + leuk est large, bacteria  Start ceftriaxone IV x3days  F/U urine culture

## 2021-03-11 NOTE — PROGRESS NOTE ADULT - PROBLEM SELECTOR PLAN 8
-Palliative team discussed with patient's wife. -Will take a conservative approach. -Patient now DNR/DNI - MOLST in chart. Will start looking in to home hospice - referral placed. -Palliative f/u.   -Per my discussion with her at bedside, his wife wants some IVF's. But doesn't want blood draws. She wants to keep him NPO for now given risk of aspiration, but doesn't want pleasure feeds or tube feedings (no NGT) at this time. Ok with holding non-essential medications. Considering home hospice, knows prognosis guarded. -Lovenox SQ for DVT PPx.  -Wound care for chronic LE wounds.

## 2021-03-11 NOTE — PROGRESS NOTE ADULT - SUBJECTIVE AND OBJECTIVE BOX
[facility] Division of Hospital Medicine  Alethea Hair MD  Pager (LATRELL-F, 8A-5P): [phone]  Other Times:  [phone]    Patient is a 89y old  Male who presents with a chief complaint of Right sided weakness (11 Mar 2021 12:34)      SUBJECTIVE / OVERNIGHT EVENTS:  ADDITIONAL REVIEW OF SYSTEMS:    MEDICATIONS  (STANDING):  cadexomer iodine 0.9% Gel 1 Application(s) Topical two times a day  dextrose 5% + sodium chloride 0.45%. 1000 milliLiter(s) (50 mL/Hr) IV Continuous <Continuous>  enoxaparin Injectable 40 milliGRAM(s) SubCutaneous daily  metoprolol tartrate Injectable 5 milliGRAM(s) IV Push every 6 hours    MEDICATIONS  (PRN):  acetaminophen   Tablet .. 650 milliGRAM(s) Oral every 6 hours PRN Temp greater or equal to 38C (100.4F), Mild Pain (1 - 3), Moderate Pain (4 - 6)  glycopyrrolate Injectable 0.2 milliGRAM(s) IV Push every 6 hours PRN Oropharyngeal secretions      CAPILLARY BLOOD GLUCOSE        I&O's Summary    10 Mar 2021 07:01  -  11 Mar 2021 07:00  --------------------------------------------------------  IN: 0 mL / OUT: 300 mL / NET: -300 mL    11 Mar 2021 07:01  -  11 Mar 2021 15:31  --------------------------------------------------------  IN: 0 mL / OUT: 200 mL / NET: -200 mL        PHYSICAL EXAM:  Vital Signs Last 24 Hrs  T(C): 36.6 (11 Mar 2021 07:53), Max: 37 (10 Mar 2021 23:08)  T(F): 97.9 (11 Mar 2021 07:53), Max: 98.6 (10 Mar 2021 23:08)  HR: 95 (11 Mar 2021 07:53) (88 - 102)  BP: 128/80 (11 Mar 2021 07:53) (128/80 - 195/72)  BP(mean): 100 (10 Mar 2021 20:46) (100 - 100)  RR: 19 (11 Mar 2021 07:53) (14 - 19)  SpO2: 97% (11 Mar 2021 07:53) (96% - 100%)  CONSTITUTIONAL: NAD, well-developed, well-groomed  EYES: PERRLA; conjunctiva and sclera clear  ENMT: Moist oral mucosa, no pharyngeal injection or exudates; normal dentition  NECK: Supple, no palpable masses; no thyromegaly  RESPIRATORY: Normal respiratory effort; lungs are clear to auscultation bilaterally  CARDIOVASCULAR: Regular rate and rhythm, normal S1 and S2, no murmur/rub/gallop; No lower extremity edema; Peripheral pulses are 2+ bilaterally  ABDOMEN: Nontender to palpation, normoactive bowel sounds, no rebound/guarding; No hepatosplenomegaly  MUSCULOSKELETAL:  Normal gait; no clubbing or cyanosis of digits; no joint swelling or tenderness to palpation  PSYCH: A+O to person, place, and time; affect appropriate  NEUROLOGY: CN 2-12 are intact and symmetric; no gross sensory deficits   SKIN: No rashes; no palpable lesions    LABS:                        10.0   11.16 )-----------( 201      ( 10 Mar 2021 12:53 )             31.7     03-10    140  |  104  |  30<H>  ----------------------------<  94  5.4<H>   |  24  |  0.92    Ca    10.9<H>      10 Mar 2021 12:53    TPro  7.5  /  Alb  3.4  /  TBili  0.3  /  DBili  x   /  AST  55<H>  /  ALT  34  /  AlkPhos  154<H>  03-10    PT/INR - ( 10 Mar 2021 14:27 )   PT: 11.8 sec;   INR: 0.98 ratio         PTT - ( 10 Mar 2021 14:27 )  PTT:25.7 sec      Urinalysis Basic - ( 11 Mar 2021 04:49 )    Color: Yellow / Appearance: Slightly Turbid / S.032 / pH: x  Gluc: x / Ketone: Negative  / Bili: Negative / Urobili: Negative   Blood: x / Protein: 30 mg/dL / Nitrite: Negative   Leuk Esterase: Large / RBC: 61 /hpf /  /HPF   Sq Epi: x / Non Sq Epi: 0 /hpf / Bacteria: Many          RADIOLOGY & ADDITIONAL TESTS:  Results Reviewed:   Imaging Personally Reviewed:  Electrocardiogram Personally Reviewed:    COORDINATION OF CARE:  Care Discussed with Consultants/Other Providers [Y/N]:  Prior or Outpatient Records Reviewed [Y/N]:   [facility] Division of Hospital Medicine  Alethea Hair MD  Pager (LATRELL-F, 8A-5P): [phone]  Other Times:  [phone]    Patient is a 89y old  Male who presents with a chief complaint of Right sided weakness (11 Mar 2021 12:34)      SUBJECTIVE / OVERNIGHT EVENTS:    VS: afebrile, 95, 128/80, on RA    Patient was examined this morning. He is lethargic, Responds to loud verbal and tactile stimuli. Not answering any questions presently.     ADDITIONAL REVIEW OF SYSTEMS: unable to obtain full ros given aphasia, clinical status.     MEDICATIONS  (STANDING):  cadexomer iodine 0.9% Gel 1 Application(s) Topical two times a day  dextrose 5% + sodium chloride 0.45%. 1000 milliLiter(s) (50 mL/Hr) IV Continuous <Continuous>  enoxaparin Injectable 40 milliGRAM(s) SubCutaneous daily  metoprolol tartrate Injectable 5 milliGRAM(s) IV Push every 6 hours    MEDICATIONS  (PRN):  acetaminophen   Tablet .. 650 milliGRAM(s) Oral every 6 hours PRN Temp greater or equal to 38C (100.4F), Mild Pain (1 - 3), Moderate Pain (4 - 6)  glycopyrrolate Injectable 0.2 milliGRAM(s) IV Push every 6 hours PRN Oropharyngeal secretions      CAPILLARY BLOOD GLUCOSE        I&O's Summary    10 Mar 2021 07:  -  11 Mar 2021 07:00  --------------------------------------------------------  IN: 0 mL / OUT: 300 mL / NET: -300 mL    11 Mar 2021 07:01  -  11 Mar 2021 15:31  --------------------------------------------------------  IN: 0 mL / OUT: 200 mL / NET: -200 mL        PHYSICAL EXAM:  Vital Signs Last 24 Hrs  T(C): 36.6 (11 Mar 2021 07:53), Max: 37 (10 Mar 2021 23:08)  T(F): 97.9 (11 Mar 2021 07:53), Max: 98.6 (10 Mar 2021 23:08)  HR: 95 (11 Mar 2021 07:53) (88 - 102)  BP: 128/80 (11 Mar 2021 07:53) (128/80 - 195/72)  BP(mean): 100 (10 Mar 2021 20:46) (100 - 100)  RR: 19 (11 Mar 2021 07:53) (14 - 19)  SpO2: 97% (11 Mar 2021 07:53) (96% - 100%)      Constitutional: lethargic not in pain  ENT:  dry MM of mouth, PERRL, anicteric   Neck: Soft and supple,  Respiratory: Breath sounds are clear bilaterally - distant, No wheezing, rales or rhonchi  Cardiovascular: S1 and S2, irregular rate and rhythm, systolic murmur heard  Gastrointestinal: Bowel Sounds present, soft, nontender, nondistended, no guarding, no rebound  Extremities: No cyanosis or clubbing or edema; warm to touch  Neurological: Lethargic, arousable - not answering qs, baseline aphasic. Now following commands presently  Skin: B/l LE distal skin wounds bandaged.   Psych: No depression or anhedonia        LABS:                        10.0   11.16 )-----------( 201      ( 10 Mar 2021 12:53 )             31.7     03-10    140  |  104  |  30<H>  ----------------------------<  94  5.4<H>   |  24  |  0.92    Ca    10.9<H>      10 Mar 2021 12:53    TPro  7.5  /  Alb  3.4  /  TBili  0.3  /  DBili  x   /  AST  55<H>  /  ALT  34  /  AlkPhos  154<H>  03-10    PT/INR - ( 10 Mar 2021 14:27 )   PT: 11.8 sec;   INR: 0.98 ratio         PTT - ( 10 Mar 2021 14:27 )  PTT:25.7 sec      Urinalysis Basic - ( 11 Mar 2021 04:49 )    Color: Yellow / Appearance: Slightly Turbid / S.032 / pH: x  Gluc: x / Ketone: Negative  / Bili: Negative / Urobili: Negative   Blood: x / Protein: 30 mg/dL / Nitrite: Negative   Leuk Esterase: Large / RBC: 61 /hpf /  /HPF   Sq Epi: x / Non Sq Epi: 0 /hpf / Bacteria: Many          RADIOLOGY & ADDITIONAL TESTS:  Results Reviewed:   Imaging Personally Reviewed:  Electrocardiogram Personally Reviewed:    COORDINATION OF CARE:  Care Discussed with Consultants/Other Providers [Y/N]:  Prior or Outpatient Records Reviewed [Y/N]:

## 2021-03-11 NOTE — PHYSICAL THERAPY INITIAL EVALUATION ADULT - PERTINENT HX OF CURRENT PROBLEM, REHAB EVAL
89 yo M PMH of Afib, CABG, PPM, HTN, BPH, PMR, and dementia aaox1 per chart documentation, wife states her  is usually aware of everyone in the family. Pt per wife is able to go to the bathroom, w/ a walker and assistance from the nursing aide usually on his own. Pt's nursing aide witnessed that the pt wasn't moving his R arm. Pt took aspirin and came to ED.  Pt is aphasic. CT Brain suspicious for acute infarct in L anterior basal ganglia/subinsular region. Images details below...

## 2021-03-11 NOTE — OCCUPATIONAL THERAPY INITIAL EVALUATION ADULT - BALANCE TRAINING, PT EVAL
GOAL: Patient will increase static sitting balance by 1/2 grade to facilitate increased ability to perform ADLs and functional mobility

## 2021-03-11 NOTE — SWALLOW BEDSIDE ASSESSMENT ADULT - COMMENTS
Per H&P: Presents with right-sided weakness and aphasia. CT head shows left MCA thrombus or embolus. -S/p stroke code. Limited options.   -Palliative and neuro discussed with patient's wife. -Will take a conservative approach. -Patient now DNR/DNI. Will start looking in to home hospice.  -Keep NPO for now pending dysphagia screen given risk of aspiration. Wife doesn't not want pleasure feeds or tube feeds (no NGT) at this time. -Will give gentle IVF's.    CT Brain 3/10: New decreased attenuation in the left anterior basal ganglia/subinsular region suspicious for acute infarction. Increased density in the region of the M1 segment of the left MCA, likely representing intraluminal clot. No acute intracranial hemorrhage.    CXR 12/28/20: (upon last admission) - clear lungs

## 2021-03-11 NOTE — PROGRESS NOTE ADULT - PROBLEM SELECTOR PLAN 6
Met with his wife that indicated based on her son's inputs, they will be waiting for 3 days to see if the patient's condition improves, if not, she will be open to re discussing about hospice; however, she now feels as the patient will be better served in an inpatient facility.

## 2021-03-11 NOTE — SWALLOW BEDSIDE ASSESSMENT ADULT - ORAL PREPARATORY PHASE
Pt did not demonstrate the ability to accept or manipulate bolus, therefore, this SLP discontinued attempted trial due to high concern for aspiration

## 2021-03-11 NOTE — PROGRESS NOTE ADULT - PROBLEM SELECTOR PROBLEM 6
History of polymyalgia rheumatica Benign prostatic hyperplasia, unspecified whether lower urinary tract symptoms present

## 2021-03-11 NOTE — PROGRESS NOTE ADULT - PROBLEM SELECTOR PLAN 4
Better controlled today.  Likely 2/2 to CVA over Dementia and unfamiliar environment.   Will advise:   -Frequent reassurance and verbal orientation   -Family members or other familiar persons by his bedside if possible   -Delusions and hallucinations should be neither endorsed nor challenged.   -Physical restraints should be avoided. Alternatives to restraint use, such as constant observation (preferably by someone familiar to the patient such as a family member), may be more effective.  -PT eval and early ambulation if possible  -Move to a room with a window and out of the ED  If severe agitation that is threatening the patient's care and well being and QTC < 500, may consider Zyprexa IM 2.5 mg qd PRN.

## 2021-03-11 NOTE — PHYSICAL THERAPY INITIAL EVALUATION ADULT - PRECAUTIONS/LIMITATIONS, REHAB EVAL
CT brain: New decreased attenuation in the left anterior basal ganglia/subinsular region suspicious for acute infarction. Increased density in the region of the M1 segment of the left MCA, likely representing intraluminal clot. No acute intracranial hemorrhage. CTA brain: Lack of flow related enhancement of the left M1 segment, the left MCA bifurcation, and proximal left M2 branches. Decreased flow related enhancement of more distal left MCA branches. Findings are consistent with the presence of intraluminal thrombus or embolus. No vascular aneurysm. No AVM. CTA neck: No flow-limiting stenosis or evidence for arterial dissection.

## 2021-03-11 NOTE — CONSULT NOTE ADULT - CONSULT REASON
code stroke
BLE wounds
b/l foot wounds
Goals of care in a patient with Dementia and poor functionality now with a new L MCA CVA.

## 2021-03-11 NOTE — PROGRESS NOTE ADULT - PROBLEM SELECTOR PLAN 7
-Lovenox SQ for DVT PPx.  -Wound care for chronic LE wounds. -Unclear if patient was still on prednisone at home. Needs to be clarified. Will hold off on any steroids at this time regardless.

## 2021-03-11 NOTE — SWALLOW BEDSIDE ASSESSMENT ADULT - ADDITIONAL RECOMMENDATIONS
Suggest GOC conversation with family  Suggest Strict and aggressive oral care throughout day  Suction PRN  Suggest face tent to provide O2 humidification due to thick, sticky secretions in oral cavity due to open mouth posture

## 2021-03-11 NOTE — PROGRESS NOTE ADULT - PROBLEM SELECTOR PLAN 3
-Will hold home donepezil and memantine given NPO status for now. Limited benefit at this point.   -Frequent reorientation as possible.   -Per wife, patient is usually not agitated at home.   -Aspiration and fall precautions. -Not on AC at home given previous history of falls and bleeds. -Per neuro recs, would hold off on full AC at this time given extent of infarct.   -At home on metoprolol 25mg ER daily. Since NPO, continue metoprolol 5mg IV Q6H   -Trops elevated possibly due to demand ischemia Type 2 MI.

## 2021-03-11 NOTE — DISCHARGE NOTE PROVIDER - NSDCCPCAREPLAN_GEN_ALL_CORE_FT
PRINCIPAL DISCHARGE DIAGNOSIS  Diagnosis: Cerebrovascular accident (CVA), unspecified mechanism  Assessment and Plan of Treatment: Pt to be transfer to PCU per family wishes.

## 2021-03-11 NOTE — OCCUPATIONAL THERAPY INITIAL EVALUATION ADULT - PERTINENT HX OF CURRENT PROBLEM, REHAB EVAL
89yo M with PMH of Afib (Not on eliquis per the wife), CABG, PPM, HTN, BPH, PMR, and dementia aaox1 per chart documentation. wife states her  is usually aware of everyone in the family,knows who the president is and she reports that last night he had a dream and was talking a lot this AM when the nursing aide came, she witnessed that the pt wasn't moving his R arm.

## 2021-03-11 NOTE — CONSULT NOTE ADULT - ASSESSMENT
Impression:    Left heel stage 4 pressure ulcer present on admission  right heel deep tissue pressure injury present on admission  Incontinence Dermatitis  Incontinence of Bladder and bowel    Recommend:  1.) topical therapy: Bilateral heel wounds - cleanse with NS, pat dry, apply iodosorb ointment, cover with allevyn foam dressings every other day  2.) Will refer case to wound care team podiatrists, Juan Mejia/Madeline/Damon, for definitive management  3.) Offload heels/feet with complete care air fluidized boots at all times  4.) Nutrition optimization  5.) Maintain on an alternating air with low air loss surface  6.) turn and reposition Q2 hours  7.) Incontinence management - incontinence pads, cleanser, pericare BID, consider external urinary catheter, ROSY moisture barrier BID and PRN for incontinent episodes    Care as per medicine. Will not follow. Please recall for new issues  Upon [facility] [address] [place] [address] [phone]  Seen with Dr. Deng  Thank you for this consult  Gabriela Tena, NICOLAS-C, CWOCN [id number]

## 2021-03-11 NOTE — PROGRESS NOTE ADULT - PROBLEM SELECTOR PROBLEM 5
Benign prostatic hyperplasia, unspecified whether lower urinary tract symptoms present Hyperlipidemia, unspecified hyperlipidemia type

## 2021-03-11 NOTE — DISCHARGE NOTE PROVIDER - HOSPITAL COURSE
89 year old male with PMH of Afib (Not on eliquis per the wife), CABG, PPM, HTN, BPH, PMR, and dementia aaox1 per chart documentation, wife states that her  is usually aware of everyone in the family, he knows who the president is and she reports that last night he had a dream and was talking a lot this AM when the nursing aide came, she witnessed that the pt wasn't moving his R arm. She states that her son is a physician so she called him who told her to give aspirin. Pt per wife is able to go to the bathroom, w/ a walker and assistance from the nursing aide usually on his own. Pt is aphasic.  CT brain was significant presence of intraluminal thrombus or embolus. neuro was c/s not a MT candidate given preMRS.   family decided on palliative. pt DNR/DNI.     Pt to be discharged to PCU for hospice care.  Plan of care and discharge was discussed with Dr. Reyes and he agrees. 89 year old male with PMH of Afib (Not on eliquis per the wife), CABG, PPM, HTN, BPH, PMR, and dementia aaox1 per chart documentation, wife states that her  is usually aware of everyone in the family, he knows who the president is and she reports that last night he had a dream and was talking a lot this AM when the nursing aide came, she witnessed that the pt wasn't moving his R arm. She states that her son is a physician so she called him who told her to give aspirin. Pt per wife is able to go to the bathroom, w/ a walker and assistance from the nursing aide usually on his own. Pt is aphasic.  CT brain was significant presence of intraluminal thrombus or embolus. neuro was c/s not a MT candidate given preMRS.   family decided on palliative. pt DNR/DNI.     Pt to be discharged to PCU for hospice care.  Plan of care and discharge was discussed with Dr. Reyes and he agrees

## 2021-03-11 NOTE — CONSULT NOTE ADULT - SUBJECTIVE AND OBJECTIVE BOX
Podiatry pager #: [phone] ([place])/ [phone] ([facility]    Patient is a 89y old  Male who presents with a chief complaint of Right sided weakness (11 Mar 2021 12:34)      HPI:  89 year old male with PMH of Afib (Not on eliquis per the wife), CABG, PPM, HTN, BPH, PMR, and dementia aaox1 per chart documentation, wife states that her  is usually aware of everyone in the family, he knows who the president is and she reports that last night he had a dream and was talking a lot this AM when the nursing aide came, she witnessed that the pt wasn't moving his R arm. She states that her son is a physician so she called him who told her to give aspirin. Pt per wife is able to go to the bathroom, w/ a walker and assistance from the nursing aide usually on his own. Pt is aphasic. (10 Mar 2021 17:06)      PAST MEDICAL & SURGICAL HISTORY:  Hyperlipidemia    Dementia    History of polymyalgia rheumatica  this is why patient is on chronic prednisone    HTN (hypertension)    BPH (benign prostatic hyperplasia)    Atrial fibrillation    H/O cardiac pacemaker    S/P CABG x 5        MEDICATIONS  (STANDING):  cadexomer iodine 0.9% Gel 1 Application(s) Topical two times a day  cefTRIAXone   IVPB 1000 milliGRAM(s) IV Intermittent every 24 hours  dextrose 5% + sodium chloride 0.45%. 1000 milliLiter(s) (50 mL/Hr) IV Continuous <Continuous>  enoxaparin Injectable 40 milliGRAM(s) SubCutaneous daily  metoprolol tartrate Injectable 5 milliGRAM(s) IV Push every 6 hours    MEDICATIONS  (PRN):  acetaminophen   Tablet .. 650 milliGRAM(s) Oral every 6 hours PRN Temp greater or equal to 38C (100.4F), Mild Pain (1 - 3), Moderate Pain (4 - 6)  glycopyrrolate Injectable 0.2 milliGRAM(s) IV Push every 6 hours PRN Oropharyngeal secretions      Allergies    No Known Allergies    Intolerances        VITALS:    Vital Signs Last 24 Hrs  T(C): 37 (11 Mar 2021 16:57), Max: 37 (10 Mar 2021 23:08)  T(F): 98.6 (11 Mar 2021 16:57), Max: 98.6 (10 Mar 2021 23:08)  HR: 74 (11 Mar 2021 16:57) (74 - 102)  BP: 121/60 (11 Mar 2021 16:57) (121/60 - 195/72)  BP(mean): 100 (10 Mar 2021 20:46) (100 - 100)  RR: 18 (11 Mar 2021 16:57) (16 - 19)  SpO2: 92% (11 Mar 2021 16:57) (92% - 100%)    LABS:                          10.0   11.16 )-----------( 201      ( 10 Mar 2021 12:53 )             31.7       03-10    140  |  104  |  30<H>  ----------------------------<  94  5.4<H>   |  24  |  0.92    Ca    10.9<H>      10 Mar 2021 12:53    TPro  7.5  /  Alb  3.4  /  TBili  0.3  /  DBili  x   /  AST  55<H>  /  ALT  34  /  AlkPhos  154<H>  03-10      CAPILLARY BLOOD GLUCOSE          PT/INR - ( 10 Mar 2021 14:27 )   PT: 11.8 sec;   INR: 0.98 ratio         PTT - ( 10 Mar 2021 14:27 )  PTT:25.7 sec    LOWER EXTREMITY PHYSICAL EXAM:    Vascular: DP/PT 0/4 B/L, CFT <3 seconds B/L, Temperature gradient warm to cool B/L  Neuro: Epicritic sensation present to the level of toes B/L  Musculoskeletal/Ortho: unremarkable  Skin: R heel fibrous wound measuring 2x3 cm down to bone w/ no drainage and no signs of infection; L heel fibrous wound mearuing 4x3 down to bone w/ no drainage and no signs of active infection       RADIOLOGY & ADDITIONAL STUDIES:

## 2021-03-11 NOTE — PROGRESS NOTE ADULT - PROBLEM SELECTOR PLAN 5
-Hold home finasteride and tamsulosin for now given NPO status.  -Monitor UO for any signs of retention. -Hold home statin given NPO status.

## 2021-03-11 NOTE — DISCHARGE NOTE PROVIDER - NSDCFUADDINST_GEN_ALL_CORE_FT
Podiatry Discharge Instructions:  Follow up: Please follow up with Dr. Correa within 1 week of discharge from the hospital, please call [phone] appointment and discuss that you recently were seen in the hospital.  Wound Care: Please apply santyl to bilateral heel wounds, then dress w/ large alleyvn pad daily.   Weight bearing: Please weight bear as tolerated in a surgical shoe.  Antibiotics: Please continue as instructed.

## 2021-03-11 NOTE — SWALLOW BEDSIDE ASSESSMENT ADULT - SWALLOW EVAL: DIAGNOSIS
89 year old male with PMH of Afib (Not on eliquis per the wife), CABG, PPM, HTN, BPH, PMR, and dementia aaox1. CT head shows left MCA thrombus or embolus. Pt presents with evidence of a severe oropharyngeal dysphagia superimposed upon cognitive deficits. Pt presents with significant thick, white secretions upon lingual surface and soft palate. Pt provided with extensive oral care via toothette and orally suctioned by this SLP via [place]. Thick white/yellow secretions removed, allowing pt to mobilize tongue minimally, volitionally. Despite presentation of ice chip, pt demonstrated poor orientation to ice-chip/spoon with absent attempt to accept or manipulate bolus. Mentation does not appear to support PO intake at this time. Further PO trials deferred due to high risk of aspiration given mental status.

## 2021-03-12 NOTE — PROGRESS NOTE ADULT - PROBLEM SELECTOR PLAN 8
-Lovenox SQ for DVT PPx.  -Wound care for chronic LE wounds.  -Oral hygiene BID  -Strict aspiration, fall precautions

## 2021-03-12 NOTE — PROGRESS NOTE ADULT - PROBLEM SELECTOR PLAN 6
-Hold home finasteride and tamsulosin for now given NPO status.  -Monitor UO for any signs of retention. Strict Is/Os  -Bladder scan top monitor for retention

## 2021-03-12 NOTE — PROGRESS NOTE ADULT - SUBJECTIVE AND OBJECTIVE BOX
[facility] Division of Hospital Medicine  Alethea Hair MD  Pager ([place], 7F-): [phone]  Other Times:  [phone]    Patient is a 89y old  Male who presents with a chief complaint of Right sided weakness (12 Mar 2021 11:57)      SUBJECTIVE / OVERNIGHT EVENTS:    Patient was examined this morning. He is lethargic, Responds to loud verbal and tactile stimuli by opening eyes. Not answering any questions presently.      ADDITIONAL REVIEW OF SYSTEMS: unable to obtain full ros given aphasia, clinical status.       MEDICATIONS  (STANDING):  cadexomer iodine 0.9% Gel 1 Application(s) Topical two times a day  cefTRIAXone   IVPB 1000 milliGRAM(s) IV Intermittent every 24 hours  collagenase Ointment 1 Application(s) Topical daily  dextrose 5% + sodium chloride 0.45%. 1000 milliLiter(s) (50 mL/Hr) IV Continuous <Continuous>  enoxaparin Injectable 40 milliGRAM(s) SubCutaneous daily  metoprolol tartrate Injectable 5 milliGRAM(s) IV Push every 6 hours    MEDICATIONS  (PRN):  acetaminophen   Tablet .. 650 milliGRAM(s) Oral every 6 hours PRN Temp greater or equal to 38C (100.4F), Mild Pain (1 - 3), Moderate Pain (4 - 6)  glycopyrrolate Injectable 0.2 milliGRAM(s) IV Push every 6 hours PRN Oropharyngeal secretions      CAPILLARY BLOOD GLUCOSE        I&O's Summary    11 Mar 2021 07:01  -  12 Mar 2021 07:00  --------------------------------------------------------  IN: 0 mL / OUT: 203 mL / NET: -203 mL        PHYSICAL EXAM:  Vital Signs Last 24 Hrs  T(C): 37.6 (12 Mar 2021 07:44), Max: 37.6 (12 Mar 2021 07:44)  T(F): 99.6 (12 Mar 2021 07:44), Max: 99.6 (12 Mar 2021 07:44)  HR: 93 (12 Mar 2021 07:44) (74 - 96)  BP: 134/73 (12 Mar 2021 07:44) (107/58 - 134/73)  BP(mean): --  RR: 18 (12 Mar 2021 07:44) (18 - 18)  SpO2: 99% (12 Mar 2021 07:44) (92% - 99%)      Constitutional: lethargic not in pain  ENT:  dry MM of mouth, PERRL, anicteric, poor dentition and poor oral hygiene  Neck: Soft and supple,  Respiratory: Breath sounds are clear bilaterally - distant, No wheezing, rales or rhonchi  Cardiovascular: S1 and S2, irregular rate and rhythm, systolic murmur heard  Gastrointestinal: Bowel Sounds present, soft, nontender, nondistended, no guarding, no rebound  Extremities: No cyanosis or clubbing or edema; warm to touch  Neurological: Lethargic, arousable - not answering qs, baseline aphasic. Now following commands presently  Skin: B/l LE distal skin wounds bandaged.   Psych: No depression or anhedonia        LABS:                        9.5    9.66  )-----------( 205      ( 12 Mar 2021 07:07 )             31.4     03-12    139  |  103  |  23  ----------------------------<  87  4.2   |  23  |  1.12    Ca    10.6<H>      12 Mar 2021 07:03      PT/INR - ( 10 Mar 2021 14:27 )   PT: 11.8 sec;   INR: 0.98 ratio         PTT - ( 10 Mar 2021 14:27 )  PTT:25.7 sec      Urinalysis Basic - ( 11 Mar 2021 04:49 )    Color: Yellow / Appearance: Slightly Turbid / S.032 / pH: x  Gluc: x / Ketone: Negative  / Bili: Negative / Urobili: Negative   Blood: x / Protein: 30 mg/dL / Nitrite: Negative   Leuk Esterase: Large / RBC: 61 /hpf /  /HPF   Sq Epi: x / Non Sq Epi: 0 /hpf / Bacteria: Many          RADIOLOGY & ADDITIONAL TESTS:  Results Reviewed:   Imaging Personally Reviewed:  Electrocardiogram Personally Reviewed:    COORDINATION OF CARE:  Care Discussed with Consultants/Other Providers [Y/N]:  Prior or Outpatient Records Reviewed [Y/N]:

## 2021-03-12 NOTE — PROGRESS NOTE ADULT - PROBLEM SELECTOR PLAN 4
-Will hold home donepezil and memantine given NPO status. Limited benefit at this point.   -Frequent reorientation as possible.   -Per wife, patient is usually not agitated at home.   -Aspiration and fall precautions.

## 2021-03-12 NOTE — PROGRESS NOTE ADULT - SUBJECTIVE AND OBJECTIVE BOX
Neurology Progress Note    S: Patient seen and examined. hospice palnning in progress.     Medication:  acetaminophen   Tablet .. 650 milliGRAM(s) Oral every 6 hours PRN  cadexomer iodine 0.9% Gel 1 Application(s) Topical two times a day  cefTRIAXone   IVPB 1000 milliGRAM(s) IV Intermittent every 24 hours  collagenase Ointment 1 Application(s) Topical daily  dextrose 5% + sodium chloride 0.45%. 1000 milliLiter(s) IV Continuous <Continuous>  enoxaparin Injectable 40 milliGRAM(s) SubCutaneous daily  glycopyrrolate Injectable 0.2 milliGRAM(s) IV Push every 6 hours PRN  metoprolol tartrate Injectable 5 milliGRAM(s) IV Push every 6 hours      Vitals:  Vital Signs Last 24 Hrs  T(C): 37.6 (12 Mar 2021 07:44), Max: 37.6 (12 Mar 2021 07:44)  T(F): 99.6 (12 Mar 2021 07:44), Max: 99.6 (12 Mar 2021 07:44)  HR: 93 (12 Mar 2021 07:44) (74 - 96)  BP: 134/73 (12 Mar 2021 07:44) (107/58 - 134/73)  BP(mean): --  RR: 18 (12 Mar 2021 07:44) (18 - 18)  SpO2: 99% (12 Mar 2021 07:44) (92% - 99%)    General Exam:   General Appearance: Appropriately dressed and in no acute distress       Head: Normocephalic, atraumatic and no dysmorphic features  Ear, Nose, and Throat: Moist mucous membranes   CVS: S1S2+  Resp: No SOB, no wheeze or rhonchi  Abd: soft NTND  Extremities: No edema, no cyanosis  Skin: No bruises, no rashes     Neurological Exam:  MS: eyes open, alert, no verbal output, gurgling and snoring noises, withdraws and grimaces to noxious stimuli  CN: noted mild left eye exotropia, pupils 2mm minimally reactive to light, difficult to evaluate for VF, right moderate facial droop  Motor: left arm and leg move spontaneously; right arm and leg minimal movement  Cerebellar: difficult to evaluate given weakness  Gait: bedbound  I personally reviewed the below data/images/labs:      CBC Full  -  ( 12 Mar 2021 07:07 )  WBC Count : 9.66 K/uL  RBC Count : 3.04 M/uL  Hemoglobin : 9.5 g/dL  Hematocrit : 31.4 %  Platelet Count - Automated : 205 K/uL  Mean Cell Volume : 103.3 fl  Mean Cell Hemoglobin : 31.3 pg  Mean Cell Hemoglobin Concentration : 30.3 gm/dL  Auto Neutrophil # : x  Auto Lymphocyte # : x  Auto Monocyte # : x  Auto Eosinophil # : x  Auto Basophil # : x  Auto Neutrophil % : x  Auto Lymphocyte % : x  Auto Monocyte % : x  Auto Eosinophil % : x  Auto Basophil % : x        139  |  103  |  23  ----------------------------<  87  4.2   |  23  |  1.12    Ca    10.6<H>      12 Mar 2021 07:03          Urinalysis Basic - ( 11 Mar 2021 04:49 )    Color: Yellow / Appearance: Slightly Turbid / S.032 / pH: x  Gluc: x / Ketone: Negative  / Bili: Negative / Urobili: Negative   Blood: x / Protein: 30 mg/dL / Nitrite: Negative   Leuk Esterase: Large / RBC: 61 /hpf /  /HPF   Sq Epi: x / Non Sq Epi: 0 /hpf / Bacteria: Many    CTA brain:  Lack of flow related enhancement of the left M1 segment, the left MCA bifurcation, and proximal left M2 branches. Decreased flow related enhancement of more distal left MCA branches. Findings are consistent with the presence of intraluminal thrombus or embolus.    No vascular aneurysm. No AVM.    CTA neck:  No flow-limiting stenosis or evidence for arterial dissection.    CT brain perfusion:  Cerebral blood flow less than 30% = 0 mL, therefore no core infarct is predicted.    Tmax greater than 6 seconds = 123 mL and involves much of the left MCA territory. This indicates territory at continued ischemic risk and the presence of neurologic symptoms.    < end of copied text >  < from: CT Angio Neck w/ IV Cont (03.10.21 @ 13:21) >  CTA brain:  Lack of flow related enhancement of the left M1 segment, the left MCA bifurcation, and proximal left M2 branches. Decreased flow related enhancement of more distal left MCA branches. Findings are consistent with the presence of intraluminal thrombus or embolus.    No vascular aneurysm. No AVM.    CTA neck:  No flow-limiting stenosis or evidence for arterial dissection.    CT brain perfusion:  Cerebral blood flow less than 30% = 0 mL, therefore no core infarct is predicted.    Tmax greater than 6 seconds = 123 mL and involves much of the left MCA territory. This indicates territory at continued ischemic risk and the presence of neurologic symptoms.

## 2021-03-12 NOTE — PROGRESS NOTE ADULT - PROBLEM SELECTOR PLAN 3
-Not on AC at home given previous history of falls and bleeds. -Per neuro recs, would hold off on full AC at this time given extent of infarct.   -At home on metoprolol 25mg ER daily. Since NPO, continue metoprolol 5mg IV Q6H   -Trops elevated possibly due to demand ischemia Type 2 MI.

## 2021-03-12 NOTE — CHART NOTE - NSCHARTNOTEFT_GEN_A_CORE
89 year old male with PMH of Afib (Not on eliquis per the wife), CABG, PPM, HTN, BPH, PMR, and dementia aaox1 per chart documentation, wife states that her  is usually aware of everyone in the family, he knows who the president is and she reports that last night he had a dream and was talking a lot this AM when the nursing aide came, she witnessed that the pt wasn't moving his R arm. She states that her son is a physician so she called him who told her to give aspirin. Pt per wife is able to go to the bathroom, w/ a walker and assistance from the nursing aide usually on his own. Pt is aphasic. CT head shows left MCA thrombus or embolus.    Pt seen for initial bedside swallow evaluation on 3/11 with recommendation for NPO, with non-oral nutrition/hydration/medications due to lethargy with suboptimal mentation.    Pt seen at bedside today, 3/12, for re-evaluation of swallow mechanism. Pt encountered bedside, obtunded, unable to be aroused despite max verbal and tactile cues. Pt provided with oral care to aid in maintaining clear/moist oral cavity, especially due to NPO status.  Pt not appropriate for PO at this time. After discussion with NICOLAS Fitzgerald, possible plan for palliative care.    Impression: Pt remains with poor mentation which does not support PO intake.    Recommendations:  1) NPO, with non-oral nutrition/hydration/medications.  2) Aspiration precautions for secretions and enteral feeds if initiated  3) Suggest GOC conversation with family    This service to f/u pending improvement in mentation    Discussed with NICOLAS Fitzgerald and shift RN    Sandra Roberts M.A. CCC-SLP  Speech-Language Pathologist  Pgr # [phone]

## 2021-03-12 NOTE — PROGRESS NOTE ADULT - PROBLEM SELECTOR PLAN 7
-Unclear if patient was still on prednisone at home. Will hold off on any steroids at this time regardless.

## 2021-03-12 NOTE — PROGRESS NOTE ADULT - ASSESSMENT
89 year old male with PMH of Afib (Not on eliquis per the wife), CABG, PPM, HTN, BPH, PMR, and dementia aaox1 per chart documentation; brought to [facility] with new right-sided weakness; found to have a left MCA stroke on CT head, patient is aphasic. Mild leukocytosis on labs. Palliative discussed with wife and patient is now DNR/DNI. Admitted for possible transfer to home hospice.

## 2021-03-12 NOTE — PROVIDER CONTACT NOTE (OTHER) - BACKGROUND
cerebral infarction Cyclosporine Counseling:  I discussed with the patient the risks of cyclosporine including but not limited to hypertension, gingival hyperplasia,myelosuppression, immunosuppression, liver damage, kidney damage, neurotoxicity, lymphoma, and serious infections. The patient understands that monitoring is required including baseline blood pressure, CBC, CMP, lipid panel and uric acid, and then 1-2 times monthly CMP and blood pressure.

## 2021-03-12 NOTE — PROGRESS NOTE ADULT - SUBJECTIVE AND OBJECTIVE BOX
Patient is a 89y old  Male who presents with a chief complaint of Right sided weakness (12 Mar 2021 12:19)       INTERVAL HPI/OVERNIGHT EVENTS:  Patient seen and evaluated at bedside.  Pt is resting comfortable in NAD.     Allergies    No Known Allergies    Intolerances        Vital Signs Last 24 Hrs  T(C): 37.4 (12 Mar 2021 16:19), Max: 37.6 (12 Mar 2021 07:44)  T(F): 99.4 (12 Mar 2021 16:19), Max: 99.6 (12 Mar 2021 07:44)  HR: 84 (12 Mar 2021 17:50) (84 - 96)  BP: 136/74 (12 Mar 2021 17:50) (107/58 - 144/71)  BP(mean): --  RR: 18 (12 Mar 2021 17:50) (18 - 18)  SpO2: 98% (12 Mar 2021 16:19) (95% - 99%)    LABS:                        9.5    9.66  )-----------( 205      ( 12 Mar 2021 07:07 )             31.4     03-12    139  |  103  |  23  ----------------------------<  87  4.2   |  23  |  1.12    Ca    10.6<H>      12 Mar 2021 07:03        Urinalysis Basic - ( 11 Mar 2021 04:49 )    Color: Yellow / Appearance: Slightly Turbid / S.032 / pH: x  Gluc: x / Ketone: Negative  / Bili: Negative / Urobili: Negative   Blood: x / Protein: 30 mg/dL / Nitrite: Negative   Leuk Esterase: Large / RBC: 61 /hpf /  /HPF   Sq Epi: x / Non Sq Epi: 0 /hpf / Bacteria: Many      CAPILLARY BLOOD GLUCOSE          Lower Extremity Physical Exam:  Vascular: DP/PT 0/4 B/L, CFT <3 seconds B/L, Temperature gradient warm to cool B/L  Neuro: Epicritic sensation present to the level of toes B/L  Musculoskeletal/Ortho: unremarkable  Skin: R heel fibrous wound measuring 2x3 cm down to bone w/ no drainage and no signs of infection; L heel fibrous wound mearuing 4x3 down to bone w/ no drainage and no signs of active infection both secondary to pressure present on admission

## 2021-03-12 NOTE — PROGRESS NOTE ADULT - ASSESSMENT
89 yo M pt w/ b/l heel wounds  - pt seen and evaluated  - R heel fibrous wound measuring 2x3 cm down to bone w/ no drainage and no signs of infection; L heel fibrous wound mearuing 4x3 down to bone w/ no drainage and no signs of active infection  - xrays negative for osteo at this time  - pod plan local wound care for now  - will monitor  Island Pedicle Flap-Requiring Vessel Identification Text: The defect edges were debeveled with a #[id number] scalpel blade.  Given the location of the defect, shape of the defect and the proximity to free margins an island pedicle advancement flap was deemed most appropriate.  Using a sterile surgical marker, an appropriate advancement flap was drawn, based on the axial vessel mentioned above, incorporating the defect, outlining the appropriate donor tissue and placing the expected incisions within the relaxed skin tension lines where possible.    The area thus outlined was incised deep to adipose tissue with a #15 scalpel blade.  The skin margins were undermined to an appropriate distance in all directions around the primary defect and laterally outward around the island pedicle utilizing iris scissors.  There was minimal undermining beneath the pedicle flap.

## 2021-03-12 NOTE — PROGRESS NOTE ADULT - PROBLEM SELECTOR PLAN 1
-Presents with right-sided weakness and aphasia. CT head shows left MCA thrombus or embolus. -S/p stroke code. Limited options.   -Palliative and neuro discussed with patient's wife. -Will take a conservative approach. -Patient DNR/DNI. Hospice referral sent  -Keep NPO. Failed SS dysphagia screen. Wife doesn't not want pleasure feeds or tube feeds (no NGT) at this time. -Continue gentle IVF's.  -IVF's will also help with hypercalcemia noted on labs.   -Neuro recs appreciated.   -Neuro checks.

## 2021-03-12 NOTE — PROGRESS NOTE ADULT - PROBLEM SELECTOR PLAN 9
-Palliative team discussed with patient's wife. -Will take a conservative approach. -Patient DNR/DNI - MOLST in chart. F/U hospice referral placed. -Palliative f/u.   -Wife wants some IVF's. But doesn't want blood draws. She wants to keep him NPO for now given risk of aspiration, but doesn't want pleasure feeds or tube feedings (no NGT) at this time. Ok with holding non-essential medications. Considering home hospice, knows prognosis guarded. Repeat GOC discussion on 3/11 - family wants to wait for 3 more days, if patient doesn't improve then open to discussing hospice again.

## 2021-03-12 NOTE — PROGRESS NOTE ADULT - ATTENDING COMMENTS
Case and plan discussed with ACP: Lia Case and plan discussed with ACP: Lia    Called wife's phone 3x to try and update however phone not answered.

## 2021-03-12 NOTE — PROGRESS NOTE ADULT - ASSESSMENT
89 yo M with Afib no on AC 2/2 fall, s/p PPM, CAD/CABG, dementia preMRS=4/5 p/w R HP and speech changes.  CTH/A/P with L M1 occlusion core 0 and penumbra of 123cc was out of tpa window. not a MT candidate given preMRS.   - family decided on palliative. DNR/I, hospice eval and referal made   - hold AC  - if in agreement with family wishes would give ASA/statin and hypertonics to avoid shift.   - Hemoglobin A1c and lipid panel  - telemetry  - PT/OT/SS/SLP, OOBC  - check FS, glucose control <180  - GI/DVT ppx  - Counseling on diet, exercise, and medication adherence was done  - Counseling on smoking cessation and alcohol consumption offered when appropriate.  - Pain assessed and judicious use of narcotics when appropriate was discussed.    - Stroke education given when appropriate.  - Importance of fall prevention discussed.   - Differential diagnosis and plan of care discussed with patient and/or family and primary team  - Thank you for allowing me to participate in the care of this patient. Call with questions.   - DNR/I no agressive measures, hospice referral. will f/u as needed or as questions arise     Eduardo Ugarte,

## 2021-03-13 NOTE — DIETITIAN INITIAL EVALUATION ADULT. - OTHER INFO
Dosing wt: 149.2 lbs. No other daily wts to address. Per previous RD note, pt with wt of 157.1 lbs on 9/4/20. Unknown UBW, past RD wt vs current dosing wt indicates 8 lb wt loss x6 months. RD will continue to trend as new wts available/able.     Pt is currently NPO x3 days. Palliative is following pt. Per Hospice Care note, family considering home hospice. Awaiting decision for rout of nutrition (PEG vs pleasure feeds). No known recent N/V, diarrhea, or constipation. Last BM 3/12. Dosing wt: 149.2 lbs. No other daily wts to address. Per previous RD note, pt with wt of 157.1 lbs on 9/4/20. Unknown UBW, past RD wt vs current dosing wt indicates 8 lb wt loss x6 months. RD will continue to trend as new wts available/able.     Pt is currently NPO x3 days. Palliative is following pt. Per Hospice Care note, family considering home hospice. Awaiting decision for route of nutrition (PEG vs pleasure feeds). Per discussion with provider family declining enteral feeds to pursue hospice. No known recent N/V, diarrhea, or constipation. Last BM 3/12.

## 2021-03-13 NOTE — DIETITIAN INITIAL EVALUATION ADULT. - PROBLEM SELECTOR PLAN 3
-Will hold home donepezil and memantine given NPO status for now. Limited benefit at this point.   -Frequent reorientation as possible.   -Per wife, patient is usually not agitated at home.   -Aspiration and fall precautions.

## 2021-03-13 NOTE — PROGRESS NOTE ADULT - SUBJECTIVE AND OBJECTIVE BOX
Neurology Progress Note    S: Patient seen and examined. hospice palnning in progress.     Medication:  MEDICATIONS  (STANDING):  cadexomer iodine 0.9% Gel 1 Application(s) Topical two times a day  cefTRIAXone   IVPB 1000 milliGRAM(s) IV Intermittent every 24 hours  collagenase Ointment 1 Application(s) Topical daily  dextrose 5% + sodium chloride 0.45%. 1000 milliLiter(s) (50 mL/Hr) IV Continuous <Continuous>  enoxaparin Injectable 40 milliGRAM(s) SubCutaneous daily  metoprolol tartrate Injectable 5 milliGRAM(s) IV Push every 6 hours    MEDICATIONS  (PRN):  acetaminophen   Tablet .. 650 milliGRAM(s) Oral every 6 hours PRN Temp greater or equal to 38C (100.4F), Mild Pain (1 - 3), Moderate Pain (4 - 6)  glycopyrrolate Injectable 0.2 milliGRAM(s) IV Push every 6 hours PRN Oropharyngeal secretions        Vitals:  Vital Signs Last 24 Hrs  T(C): 36.6 (13 Mar 2021 09:39), Max: 37.4 (12 Mar 2021 16:19)  T(F): 97.9 (13 Mar 2021 09:39), Max: 99.4 (12 Mar 2021 16:19)  HR: 86 (13 Mar 2021 12:50) (71 - 100)  BP: 150/71 (13 Mar 2021 12:50) (136/74 - 168/74)  BP(mean): --  RR: 18 (13 Mar 2021 09:39) (18 - 18)  SpO2: 95% (13 Mar 2021 09:39) (92% - 98%)    General Exam:   General Appearance: Appropriately dressed and in no acute distress       Head: Normocephalic, atraumatic and no dysmorphic features  Ear, Nose, and Throat: Moist mucous membranes   CVS: S1S2+  Resp: No SOB, no wheeze or rhonchi  Abd: soft NTND  Extremities: No edema, no cyanosis  Skin: No bruises, no rashes     Neurological Exam:  MS: eyes open, alert, no verbal output, gurgling and snoring noises, withdraws and grimaces to noxious stimuli  CN: noted mild left eye exotropia, pupils 2mm minimally reactive to light, difficult to evaluate for VF, right moderate facial droop  Motor: left arm and leg move spontaneously; right arm and leg minimal movement  Cerebellar: difficult to evaluate given weakness  Gait: bedbound  I personally reviewed the below data/images/labs:    CBC Full  -  ( 12 Mar 2021 07:07 )  WBC Count : 9.66 K/uL  RBC Count : 3.04 M/uL  Hemoglobin : 9.5 g/dL  Hematocrit : 31.4 %  Platelet Count - Automated : 205 K/uL  Mean Cell Volume : 103.3 fl  Mean Cell Hemoglobin : 31.3 pg  Mean Cell Hemoglobin Concentration : 30.3 gm/dL  Auto Neutrophil # : x  Auto Lymphocyte # : x  Auto Monocyte # : x  Auto Eosinophil # : x  Auto Basophil # : x  Auto Neutrophil % : x  Auto Lymphocyte % : x  Auto Monocyte % : x  Auto Eosinophil % : x  Auto Basophil % : x    03-12    139  |  103  |  23  ----------------------------<  87  4.2   |  23  |  1.12    Ca    10.6<H>      12 Mar 2021 07:03          Urinalysis Basic - ( 11 Mar 2021 04:49 )    Color: Yellow / Appearance: Slightly Turbid / S.032 / pH: x  Gluc: x / Ketone: Negative  / Bili: Negative / Urobili: Negative   Blood: x / Protein: 30 mg/dL / Nitrite: Negative   Leuk Esterase: Large / RBC: 61 /hpf /  /HPF   Sq Epi: x / Non Sq Epi: 0 /hpf / Bacteria: Many    CTA brain:  Lack of flow related enhancement of the left M1 segment, the left MCA bifurcation, and proximal left M2 branches. Decreased flow related enhancement of more distal left MCA branches. Findings are consistent with the presence of intraluminal thrombus or embolus.    No vascular aneurysm. No AVM.    CTA neck:  No flow-limiting stenosis or evidence for arterial dissection.    CT brain perfusion:  Cerebral blood flow less than 30% = 0 mL, therefore no core infarct is predicted.    Tmax greater than 6 seconds = 123 mL and involves much of the left MCA territory. This indicates territory at continued ischemic risk and the presence of neurologic symptoms.    < end of copied text >  < from: CT Angio Neck w/ IV Cont (03.10.21 @ 13:21) >  CTA brain:  Lack of flow related enhancement of the left M1 segment, the left MCA bifurcation, and proximal left M2 branches. Decreased flow related enhancement of more distal left MCA branches. Findings are consistent with the presence of intraluminal thrombus or embolus.    No vascular aneurysm. No AVM.    CTA neck:  No flow-limiting stenosis or evidence for arterial dissection.    CT brain perfusion:  Cerebral blood flow less than 30% = 0 mL, therefore no core infarct is predicted.    Tmax greater than 6 seconds = 123 mL and involves much of the left MCA territory. This indicates territory at continued ischemic risk and the presence of neurologic symptoms.

## 2021-03-13 NOTE — DIETITIAN INITIAL EVALUATION ADULT. - PROBLEM SELECTOR PLAN 2
-Not on AC at home given previous history of falls and bleeds. -Per neuro recs, would hold off on full AC at this time given extent of infarct.   -At home on metoprolol 25mg ER daily. Since NPO, can instead give metoprolol 5mg IV Q6H standing for now.  -Trops elevated possibly due to demand ischemia Type 2 MI.

## 2021-03-13 NOTE — PROGRESS NOTE ADULT - PROBLEM SELECTOR PLAN 1
-Presents with right-sided weakness and aphasia. CT head shows left MCA thrombus or embolus. -S/p stroke code. Limited options.   -Palliative and neuro discussed with patient's wife. -Will take a conservative approach. -Patient DNR/DNI. Hospice referral sent however family want a few days before deciding  -Keep NPO. Failed SS dysphagia screen. Wife doesn't not want pleasure feeds or tube feeds (no NGT) at this time. -Continue gentle IVF's.  -IVF's will also help with hypercalcemia noted on labs.   -Neuro recs appreciated.   -Neuro checks.

## 2021-03-13 NOTE — PROGRESS NOTE ADULT - SUBJECTIVE AND OBJECTIVE BOX
[facility] Division of Hospital Medicine  Sabiha Cervantes DO  Pager (LATRELL-COLLEEN, 4F-3Z): [phone]  Other Times:  [phone]    Patient is a 89y old  Male who presents with a chief complaint of Right sided weakness (13 Mar 2021 12:02)      SUBJECTIVE / OVERNIGHT EVENTS: no events. Patient has eyes open but not speaking, nodded his head once.  ADDITIONAL REVIEW OF SYSTEMS: negative    MEDICATIONS  (STANDING):  cadexomer iodine 0.9% Gel 1 Application(s) Topical two times a day  cefTRIAXone   IVPB 1000 milliGRAM(s) IV Intermittent every 24 hours  collagenase Ointment 1 Application(s) Topical daily  dextrose 5% + sodium chloride 0.45%. 1000 milliLiter(s) (50 mL/Hr) IV Continuous <Continuous>  enoxaparin Injectable 40 milliGRAM(s) SubCutaneous daily  metoprolol tartrate Injectable 5 milliGRAM(s) IV Push every 6 hours    MEDICATIONS  (PRN):  acetaminophen   Tablet .. 650 milliGRAM(s) Oral every 6 hours PRN Temp greater or equal to 38C (100.4F), Mild Pain (1 - 3), Moderate Pain (4 - 6)  glycopyrrolate Injectable 0.2 milliGRAM(s) IV Push every 6 hours PRN Oropharyngeal secretions      CAPILLARY BLOOD GLUCOSE        I&O's Summary    12 Mar 2021 07:01  -  13 Mar 2021 07:00  --------------------------------------------------------  IN: 0 mL / OUT: 0 mL / NET: 0 mL        PHYSICAL EXAM:  Vital Signs Last 24 Hrs  T(C): 36.6 (13 Mar 2021 09:39), Max: 37.4 (12 Mar 2021 16:19)  T(F): 97.9 (13 Mar 2021 09:39), Max: 99.4 (12 Mar 2021 16:19)  HR: 86 (13 Mar 2021 12:50) (71 - 100)  BP: 150/71 (13 Mar 2021 12:50) (136/74 - 168/74)  BP(mean): --  RR: 18 (13 Mar 2021 09:39) (18 - 18)  SpO2: 95% (13 Mar 2021 09:39) (92% - 98%)    CONSTITUTIONAL: NAD, elderly, thin.  EYES: PERRLA; conjunctiva and sclera clear  ENMT: Moist oral mucosa, no pharyngeal injection or exudates  NECK: Supple, no palpable masses; no thyromegaly  RESPIRATORY: Normal respiratory effort; lungs are clear to auscultation bilaterally  CARDIOVASCULAR: Regular rate and rhythm, normal S1 and S2, no murmur/rub/gallop; No lower extremity edema; Peripheral pulses are 2+ bilaterally  ABDOMEN: Nontender to palpation, normoactive bowel sounds, no rebound/guarding; No hepatosplenomegaly  MUSCULOSKELETAL: no clubbing or cyanosis of digits; no joint swelling or tenderness to palpation  PSYCH: unable to assess. awake.  NEUROLOGY: withdraws to pain. non-verbal. RUE and RLE weaker than left.   SKIN: No rashes; no palpable lesions    LABS:                        9.5    9.66  )-----------( 205      ( 12 Mar 2021 07:07 )             31.4     03-13    137  |  103  |  22  ----------------------------<  96  3.6   |  22  |  1.07    Ca    10.4      13 Mar 2021 06:38                Culture - Urine (collected 11 Mar 2021 08:56)  Source: .Urine Clean Catch (Midstream)  Preliminary Report (13 Mar 2021 02:27):    >100,000 CFU/ml Gram Negative Rods     [facility] Division of Hospital Medicine  Sabiha Cervantes DO  Pager (LATRELL-COLLEEN, 5Z-7J): [phone]  Other Times:  [phone]    Patient is a 89y old  Male who presents with a chief complaint of Right sided weakness (13 Mar 2021 12:02)      SUBJECTIVE / OVERNIGHT EVENTS: no events. Patient has eyes open but not speaking, nodded his head once.  ADDITIONAL REVIEW OF SYSTEMS: negative    MEDICATIONS  (STANDING):  cadexomer iodine 0.9% Gel 1 Application(s) Topical two times a day  cefTRIAXone   IVPB 1000 milliGRAM(s) IV Intermittent every 24 hours  collagenase Ointment 1 Application(s) Topical daily  dextrose 5% + sodium chloride 0.45%. 1000 milliLiter(s) (50 mL/Hr) IV Continuous <Continuous>  enoxaparin Injectable 40 milliGRAM(s) SubCutaneous daily  metoprolol tartrate Injectable 5 milliGRAM(s) IV Push every 6 hours    MEDICATIONS  (PRN):  acetaminophen   Tablet .. 650 milliGRAM(s) Oral every 6 hours PRN Temp greater or equal to 38C (100.4F), Mild Pain (1 - 3), Moderate Pain (4 - 6)  glycopyrrolate Injectable 0.2 milliGRAM(s) IV Push every 6 hours PRN Oropharyngeal secretions      CAPILLARY BLOOD GLUCOSE        I&O's Summary    12 Mar 2021 07:01  -  13 Mar 2021 07:00  --------------------------------------------------------  IN: 0 mL / OUT: 0 mL / NET: 0 mL        PHYSICAL EXAM:  Vital Signs Last 24 Hrs  T(C): 36.6 (13 Mar 2021 09:39), Max: 37.4 (12 Mar 2021 16:19)  T(F): 97.9 (13 Mar 2021 09:39), Max: 99.4 (12 Mar 2021 16:19)  HR: 86 (13 Mar 2021 12:50) (71 - 100)  BP: 150/71 (13 Mar 2021 12:50) (136/74 - 168/74)  BP(mean): --  RR: 18 (13 Mar 2021 09:39) (18 - 18)  SpO2: 95% (13 Mar 2021 09:39) (92% - 98%)    CONSTITUTIONAL: NAD, elderly, thin.  EYES: PERRLA; conjunctiva and sclera clear  ENMT: Moist oral mucosa, no pharyngeal injection or exudates  NECK: Supple, no palpable masses; no thyromegaly  RESPIRATORY: Normal respiratory effort; lungs are clear to auscultation bilaterally  CARDIOVASCULAR: Regular rate and rhythm, normal S1 and S2, no murmur/rub/gallop; No lower extremity edema; Peripheral pulses are 2+ bilaterally  ABDOMEN: Nontender to palpation, normoactive bowel sounds, no rebound/guarding; No hepatosplenomegaly  MUSCULOSKELETAL: no clubbing or cyanosis of digits; no joint swelling or tenderness to palpation  PSYCH: unable to assess. awake.  NEUROLOGY: withdraws to pain. non-verbal. RUE and RLE weaker than left.     LABS:                        9.5    9.66  )-----------( 205      ( 12 Mar 2021 07:07 )             31.4     03-13    137  |  103  |  22  ----------------------------<  96  3.6   |  22  |  1.07    Ca    10.4      13 Mar 2021 06:38                Culture - Urine (collected 11 Mar 2021 08:56)  Source: .Urine Clean Catch (Midstream)  Preliminary Report (13 Mar 2021 02:27):    >100,000 CFU/ml Gram Negative Rods

## 2021-03-13 NOTE — PROGRESS NOTE ADULT - ASSESSMENT
89 year old male with PMH of Afib (Not on eliquis per the wife), CABG, PPM, HTN, BPH, PMR, and dementia aaox1 per chart documentation; brought to [facility] with new right-sided weakness; found to have a left MCA stroke on CT head, patient is aphasic. Mild leukocytosis on labs. Palliative discussed with wife and patient is now DNR/DNI. Admitted for possible transfer to home hospice.

## 2021-03-13 NOTE — DIETITIAN INITIAL EVALUATION ADULT. - ADD RECOMMEND
3) If within GOC, recommend multivitamin and Vitamin C to aid in wound healing. 4) Trend electrolytes and replete as needed/able. 5) Continue to trend labs, weight, skin integrity, and intake.

## 2021-03-13 NOTE — DIETITIAN INITIAL EVALUATION ADULT. - CHIEF COMPLAINT
unable to assess The patient is a 89y Male Hx of Afib, CABG, PPM, HTN, BPH, PMR, and dementia aaox1.

## 2021-03-13 NOTE — DIETITIAN INITIAL EVALUATION ADULT. - CONTINUE CURRENT NUTRITION CARE PLAN
1) RD remains available for diet changes as needed/able. If PO diet do not recommend therapeutic restrictions at this time. Consistency deferred to provider.

## 2021-03-13 NOTE — DIETITIAN INITIAL EVALUATION ADULT. - PROBLEM SELECTOR PLAN 8
-Palliative team discussed with patient's wife. -Will take a conservative approach. -Patient now DNR/DNI - MOLST in chart. Will start looking in to home hospice - referral placed. -Palliative f/u.   -Per my discussion with her at bedside, his wife wants some IVF's. But doesn't want blood draws. She wants to keep him NPO for now given risk of aspiration, but doesn't want pleasure feeds or tube feedings (no NGT) at this time. Ok with holding non-essential medications. Considering home hospice, knows prognosis guarded.

## 2021-03-13 NOTE — PROGRESS NOTE ADULT - ASSESSMENT
89 yo M with Afib no on AC 2/2 fall, s/p PPM, CAD/CABG, dementia preMRS=4/5 p/w R HP and speech changes.  CTH/A/P with L M1 occlusion core 0 and penumbra of 123cc was out of tpa window. not a MT candidate given preMRS.   - family decided on palliative. DNR/I, hospice eval and referal made, family deciding  - hold AC  - if in agreement with family wishes would give ASA/statin and hypertonics to avoid shift.   - Hemoglobin A1c and lipid panel  - telemetry  - PT/OT/SS/SLP, OOBC  - check FS, glucose control <180  - GI/DVT ppx  - Counseling on diet, exercise, and medication adherence was done  - Counseling on smoking cessation and alcohol consumption offered when appropriate.  - Pain assessed and judicious use of narcotics when appropriate was discussed.    - Stroke education given when appropriate.  - Importance of fall prevention discussed.   - Differential diagnosis and plan of care discussed with patient and/or family and primary team  - Thank you for allowing me to participate in the care of this patient. Call with questions.   - DNR/I no agressive measures, hospice referral. will f/u as needed or as questions arise     Eduardo Ugarte,

## 2021-03-13 NOTE — DIETITIAN INITIAL EVALUATION ADULT. - REASON INDICATOR FOR ASSESSMENT
Consult received and appreciated on Pressure Injury Stage 2 or >.   Source: Medical record and RN (Pt with dementia and unable to participate in RD assessment) Consult received and appreciated on Pressure Injury Stage 2 or >.   Source: Medical record, provider, and RN (Pt with dementia and unable to participate in RD assessment)

## 2021-03-13 NOTE — DIETITIAN INITIAL EVALUATION ADULT. - ORAL INTAKE PTA/DIET HISTORY
Unknown how pt was eating PTA. Unknown if pt was following a therapeutic diet. No known food allergies. Per H&P, pt was on elemental calcium and cholecalciferol. No known nutrition supplement use. Pt seen by SLP on 3/12 with recommendation, "NPO, with non-oral nutrition/hydration/medications."

## 2021-03-13 NOTE — PROGRESS NOTE ADULT - PROBLEM SELECTOR PLAN 9
-Palliative team discussed with patient's wife. -Will take a conservative approach. -Patient DNR/DNI - MOLST in chart. F/U hospice referral placed. -Palliative f/u.   -Wife wants some IVF's. But doesn't want blood draws. She wants to keep him NPO for now given risk of aspiration, but doesn't want pleasure feeds or tube feedings (no NGT) at this time. Ok with holding non-essential medications. Considering home hospice, knows prognosis guarded. GOC discussion on 3/11 - family wants to wait for 3 more days, if patient doesn't improve then open to discussing hospice again.

## 2021-03-13 NOTE — DIETITIAN INITIAL EVALUATION ADULT. - RD TO REMAIN AVAILABLE
Pt wanted to leave hospital against medical advice.  Spoke to Dr Suarez. According to attending, pt has questionable capacity. He can not sign papers and need psych evaluation for capacity.   Consulted Dr Guerrero. Christel Parks, MS, RD, CDN Pager #[phone]/yes

## 2021-03-13 NOTE — PROGRESS NOTE ADULT - PROBLEM SELECTOR PLAN 6
-Hold home finasteride and tamsulosin for now given NPO status.  -Monitor UO for any signs of retention. Strict Is/Os  -Bladder scan monitor for retention

## 2021-03-13 NOTE — DIETITIAN INITIAL EVALUATION ADULT. - PROBLEM SELECTOR PLAN 1
-Presents with right-sided weakness and aphasia. CT head shows left MCA thrombus or embolus. -S/p stroke code. Limited options.   -Palliative and neuro discussed with patient's wife. -Will take a conservative approach. -Patient now DNR/DNI. Will start looking in to home hospice.  -Keep NPO for now pending dysphagia screen given risk of aspiration. Wife doesn't not want pleasure feeds or tube feeds (no NGT) at this time. -Will give gentle IVF's.  -Will check UA and urine culture given leukocytosis on admission in case some degree of his mental status changes may be reversible. Consider trial of abx if UA positive.   -IVF's will also help with hypercalcemia noted on labs. -Hyperkalemia was due to hemolysis of lab sample.  -Neuro recs appreciated.   -PT/OT/swallow eval. Neuro checks.

## 2021-03-14 NOTE — PROGRESS NOTE ADULT - SUBJECTIVE AND OBJECTIVE BOX
Neurology Progress Note    S: Patient seen and examined. hospice palnning in progress. CTH obtained with evolving L MCA infarct.  patient with eyes open and tracking a bit more     Medication:  MEDICATIONS  (STANDING):  artificial  tears Solution 1 Drop(s) Both EYES three times a day  cadexomer iodine 0.9% Gel 1 Application(s) Topical two times a day  collagenase Ointment 1 Application(s) Topical daily  dextrose 5% + sodium chloride 0.45%. 1000 milliLiter(s) (50 mL/Hr) IV Continuous <Continuous>  enoxaparin Injectable 40 milliGRAM(s) SubCutaneous daily  metoprolol tartrate Injectable 5 milliGRAM(s) IV Push every 6 hours    MEDICATIONS  (PRN):  acetaminophen   Tablet .. 650 milliGRAM(s) Oral every 6 hours PRN Temp greater or equal to 38C (100.4F), Mild Pain (1 - 3), Moderate Pain (4 - 6)  glycopyrrolate Injectable 0.2 milliGRAM(s) IV Push every 6 hours PRN Oropharyngeal secretions        Vitals:  Vital Signs Last 24 Hrs  T(C): 36.9 (14 Mar 2021 07:57), Max: 37.2 (13 Mar 2021 23:47)  T(F): 98.4 (14 Mar 2021 07:57), Max: 99 (13 Mar 2021 23:47)  HR: 103 (14 Mar 2021 07:57) (84 - 103)  BP: 129/69 (14 Mar 2021 07:57) (124/64 - 158/86)  BP(mean): --  RR: 18 (14 Mar 2021 07:57) (18 - 18)  SpO2: 91% (14 Mar 2021 07:57) (91% - 98%)    General Exam:   General Appearance: Appropriately dressed and in no acute distress       Head: Normocephalic, atraumatic and no dysmorphic features  Ear, Nose, and Throat: Moist mucous membranes   CVS: S1S2+  Resp: No SOB, no wheeze or rhonchi  Abd: soft NTND  Extremities: No edema, no cyanosis  Skin: No bruises, no rashes     Neurological Exam:  MS: eyes open, alert, no verbal output, withdraws and grimaces to noxious stimuli  CN: noted mild left eye exotropia, able to cross midline to right today, pupils 2mm reactive to light,R HHA, R moderate facial droop  Motor: left arm and leg move spontaneously 3/5; right arm and leg minimal movement 0/5 essentially plegic  Cerebellar: unable   Gait: bedbound  I personally reviewed the below data/images/labs:    03-13    137  |  103  |  22  ----------------------------<  96  3.6   |  22  |  1.07    Ca    10.4      13 Mar 2021 06:38      CTA brain:  Lack of flow related enhancement of the left M1 segment, the left MCA bifurcation, and proximal left M2 branches. Decreased flow related enhancement of more distal left MCA branches. Findings are consistent with the presence of intraluminal thrombus or embolus.    No vascular aneurysm. No AVM.    CTA neck:  No flow-limiting stenosis or evidence for arterial dissection.    CT brain perfusion:  Cerebral blood flow less than 30% = 0 mL, therefore no core infarct is predicted.    Tmax greater than 6 seconds = 123 mL and involves much of the left MCA territory. This indicates territory at continued ischemic risk and the presence of neurologic symptoms.    < end of copied text >  < from: CT Angio Neck w/ IV Cont (03.10.21 @ 13:21) >  CTA brain:  Lack of flow related enhancement of the left M1 segment, the left MCA bifurcation, and proximal left M2 branches. Decreased flow related enhancement of more distal left MCA branches. Findings are consistent with the presence of intraluminal thrombus or embolus.    No vascular aneurysm. No AVM.    CTA neck:  No flow-limiting stenosis or evidence for arterial dissection.    CT brain perfusion:  Cerebral blood flow less than 30% = 0 mL, therefore no core infarct is predicted.    Tmax greater than 6 seconds = 123 mL and involves much of the left MCA territory. This indicates territory at continued ischemic risk and the presence of neurologic symptoms.

## 2021-03-14 NOTE — PROGRESS NOTE ADULT - ASSESSMENT
89 year old male with PMH of Afib (Not on eliquis per the wife), CABG, PPM, HTN, BPH, PMR, and dementia aaox1 per chart documentation; brought to [facility] with new right-sided weakness; found to have a left MCA stroke on CT head, patient is aphasic. Mild leukocytosis on labs. Palliative discussed with wife and patient is now DNR/DNI. Admitted for possible transfer to home hospice.

## 2021-03-14 NOTE — PROGRESS NOTE ADULT - PROBLEM SELECTOR PLAN 2
Leukocytosis  UA + leuk est large, bacteria  Continue ceftriaxone IV x3days  F/U urine culture- E coli, sens to follow

## 2021-03-14 NOTE — PROGRESS NOTE ADULT - SUBJECTIVE AND OBJECTIVE BOX
[facility] Division of Hospital Medicine  Sabiha Cervantes   Pager (LATRELL-F, 2T-0G): [phone]  Other Times:  [phone]    Patient is a 89y old  Male who presents with a chief complaint of Right sided weakness (13 Mar 2021 15:28)      SUBJECTIVE / OVERNIGHT EVENTS: none. Patient this morning had his eyes closed, he was not responding to voice nor touch. Withdrew and grimaced to pain though.   ADDITIONAL REVIEW OF SYSTEMS: negative    MEDICATIONS  (STANDING):  artificial  tears Solution 1 Drop(s) Both EYES three times a day  cadexomer iodine 0.9% Gel 1 Application(s) Topical two times a day  collagenase Ointment 1 Application(s) Topical daily  dextrose 5% + sodium chloride 0.45%. 1000 milliLiter(s) (50 mL/Hr) IV Continuous <Continuous>  enoxaparin Injectable 40 milliGRAM(s) SubCutaneous daily  metoprolol tartrate Injectable 5 milliGRAM(s) IV Push every 6 hours    MEDICATIONS  (PRN):  acetaminophen   Tablet .. 650 milliGRAM(s) Oral every 6 hours PRN Temp greater or equal to 38C (100.4F), Mild Pain (1 - 3), Moderate Pain (4 - 6)  glycopyrrolate Injectable 0.2 milliGRAM(s) IV Push every 6 hours PRN Oropharyngeal secretions      CAPILLARY BLOOD GLUCOSE        I&O's Summary    13 Mar 2021 06:01  -  14 Mar 2021 07:00  --------------------------------------------------------  IN: 700 mL / OUT: 950 mL / NET: -250 mL        PHYSICAL EXAM:  Vital Signs Last 24 Hrs  T(C): 36.9 (14 Mar 2021 07:57), Max: 37.2 (13 Mar 2021 23:47)  T(F): 98.4 (14 Mar 2021 07:57), Max: 99 (13 Mar 2021 23:47)  HR: 103 (14 Mar 2021 07:57) (84 - 103)  BP: 129/69 (14 Mar 2021 07:57) (124/64 - 158/86)  BP(mean): --  RR: 18 (14 Mar 2021 07:57) (18 - 18)  SpO2: 91% (14 Mar 2021 07:57) (91% - 98%)    CONSTITUTIONAL: NAD, elderly, thin.  EYES: PERRLA; conjunctiva and sclera clear  ENMT: dry oral mucosa, no pharyngeal injection or exudates  NECK: Supple, no palpable masses; no thyromegaly  RESPIRATORY: Normal respiratory effort; lungs are clear to auscultation bilaterally  CARDIOVASCULAR: Regular rate and rhythm, normal S1 and S2, no murmur/rub/gallop; No lower extremity edema; Peripheral pulses are 2+ bilaterally  ABDOMEN: Nontender to palpation, normoactive bowel sounds, no rebound/guarding; No hepatosplenomegaly  MUSCULOSKELETAL: no clubbing or cyanosis of digits; no joint swelling or tenderness to palpation  PSYCH: unable to assess. awake.  NEUROLOGY: withdraws to pain. non-verbal. RUE and RLE weaker than left. Opens eyes at times but does not focus.     LABS:    03-13    137  |  103  |  22  ----------------------------<  96  3.6   |  22  |  1.07    Ca    10.4      13 Mar 2021 06:38

## 2021-03-14 NOTE — PROGRESS NOTE ADULT - PROBLEM SELECTOR PLAN 1
-Presents with right-sided weakness and aphasia. CT head shows left MCA thrombus or embolus. -S/p stroke code. Limited options.   -Palliative and neuro discussed with patient's wife. -Will take a conservative approach. -Patient DNR/DNI. Hospice referral sent however family want a few days before deciding  -Keep NPO. Failed SS dysphagia screen. Wife doesn't not want pleasure feeds or tube feeds (no NGT) at this time. -Continue gentle IVF's.  -Neuro recs appreciated.   -repeat CTH 3/14 shows evolving L MCA infarct increased in size

## 2021-03-14 NOTE — PROGRESS NOTE ADULT - ATTENDING COMMENTS
Sabiha Cervantes DO  Pager #: [phone]    Spoke with patient's wife at bedside yesterday. She seems to believe that the patient is "waking up" when he squeezes her hand. Will speak with her today for updates.

## 2021-03-14 NOTE — PROGRESS NOTE ADULT - ASSESSMENT
89 yo M with Afib no on AC 2/2 fall, s/p PPM, CAD/CABG, dementia preMRS=4/5 p/w R HP and speech changes.  CTH/A/P with L M1 occlusion core 0 and penumbra of 123cc was out of tpa window. not a MT candidate given preMRS. CTH obtained this AM shows evolving L MCA Infarct but some sparing of the cortex noted. o/e with some improvement now still with gaze pref but can cross midline R still plegic and patient still aphasic. would still require peg if family decided against comfort   - family decided on palliative. DNR/I, hospice eval and referal made, family deciding  - hold AC, okay for asa  - if in agreement with family wishes would give ASA/statin and hypertonics to avoid shift.   - Hemoglobin A1c and lipid panel  - telemetry  - PT/OT/SS/SLP, OOBC  - check FS, glucose control <180  - GI/DVT ppx  - Counseling on diet, exercise, and medication adherence was done  - Counseling on smoking cessation and alcohol consumption offered when appropriate.  - Pain assessed and judicious use of narcotics when appropriate was discussed.    - Stroke education given when appropriate.  - Importance of fall prevention discussed.   - Differential diagnosis and plan of care discussed with patient and/or family and primary team  - Thank you for allowing me to participate in the care of this patient. Call with questions.   - DNR/I no agressive measures, hospice referral. will f/u as needed or as questions arise     Eduardo Ugarte,

## 2021-03-15 NOTE — PROGRESS NOTE ADULT - ASSESSMENT
89 yo M with Afib no on AC 2/2 fall, s/p PPM, CAD/CABG, dementia preMRS=4/5 p/w R HP and speech changes.  CTH/A/P with L M1 occlusion core 0 and penumbra of 123cc was out of tpa window. not a MT candidate given preMRS. CTH obtained this AM shows evolving L MCA Infarct but some sparing of the cortex noted. o/e with some improvement now still with gaze pref but can cross midline R still plegic and patient still aphasic. would still require peg if family decided against comfort   - family decided on palliative. DNR/I, hospice eval and referal made, family deciding  - hold AC, okay for asa  - if in agreement with family wishes would give ASA/statin and hypertonics to avoid shift.   - Hemoglobin A1c and lipid panel  - telemetry  - PT/OT/SS/SLP, OOBC  - check FS, glucose control <180  - GI/DVT ppx  - Counseling on diet, exercise, and medication adherence was done  - Counseling on smoking cessation and alcohol consumption offered when appropriate.  - Pain assessed and judicious use of narcotics when appropriate was discussed.    - Stroke education given when appropriate.  - Importance of fall prevention discussed.   - Differential diagnosis and plan of care discussed with patient and/or family and primary team  - Thank you for allowing me to participate in the care of this patient. Call with questions.   - DNR/I no agressive measures, hospice referral. will f/u as needed or as questions arise     Eduardo Ugarte,

## 2021-03-15 NOTE — PROGRESS NOTE ADULT - SUBJECTIVE AND OBJECTIVE BOX
HPI:  89 M w/ afib (Not on eliquis per the wife) CABG, PPM, HTN, BPH, and dementia aaox1. Per EMR, "wife states that her  aware of everyone in the family, he knows who the president..." and "...is able to go to the bathroom, w/ a walker and assistance from the nursing aide." However, earlier today, the patient's aid witnessed that the pt wasn't moving his R arm. His wife, called her son, who is a Neurologist and indicated to give him an aspirin. The patient was aphasic. CT on admission showed Left MCA stroke. Palliative care was consulted for GOC and PCU eval.     3/11 Patient was lethargic and still not able to f/u commands. Neuro deficit is persistent   3/15 Though more alert, still not able to f/u commands and with aphasia, dysphagia and oropharyngeal secretions.         PERTINENT PM/SXH:   History of polymyalgia rheumatica    HTN (hypertension)    BPH (benign prostatic hyperplasia)    Atrial fibrillation      H/O cardiac pacemaker    S/P CABG x 5      FAMILY HISTORY:  No pertinent family history in first degree relatives    MEDICATIONS  (STANDING):  artificial  tears Solution 1 Drop(s) Both EYES three times a day  cadexomer iodine 0.9% Gel 1 Application(s) Topical two times a day  collagenase Ointment 1 Application(s) Topical daily  dextrose 5% + sodium chloride 0.45%. 1000 milliLiter(s) (50 mL/Hr) IV Continuous <Continuous>  enoxaparin Injectable 40 milliGRAM(s) SubCutaneous daily  metoprolol tartrate Injectable 5 milliGRAM(s) IV Push every 6 hours    MEDICATIONS  (PRN):  acetaminophen   Tablet .. 650 milliGRAM(s) Oral every 6 hours PRN Temp greater or equal to 38C (100.4F), Mild Pain (1 - 3), Moderate Pain (4 - 6)  glycopyrrolate Injectable 0.2 milliGRAM(s) IV Push every 6 hours PRN Oropharyngeal secretions      ITEMS NOT CHECKED ARE NOT PRESENT    SOCIAL HISTORY:   Significant other/partner[ x]   Children[x ]  Sabianism/Spirituality:  Substance hx:  [ ]   Tobacco hx:  [ ]   Alcohol hx: [ ]   Home Opioid hx:  [ ] I-Stop Reference No:  Living Situation: [ x]Home  [ ]Long term care  [ ]Rehab [ ]Other  Lives with wife and has 12 h HHA.    ADVANCE DIRECTIVES:    DNR  MOLST  [x ]  Living Will  [ ]   DECISION MAKER(s):  [ ] Health Care Proxy(s)  [ x] Surrogate(s)  [ ] Guardian           Name(s): Phone Number(s): Wife     BASELINE (I)ADL(s) (prior to admission):  [place]: [ ]Total  [ ] Moderate [x ]Dependent    Allergies    No Known Allergies    Intolerances    MEDICATIONS  (STANDING):    MEDICATIONS  (PRN):    PRESENT SYMPTOMS: [ x]Unable to obtain due to poor mentation   Source if other than patient:  [ ]Family   [ ]Team     Pain: [ ]yes [x ]no  QOL impact -   Location -                    Aggravating factors -  Quality -  Radiation -  Timing-  Severity (0-10 scale):  Minimal acceptable level (0-10 scale):     CPOT:    https://www.[facility].org/getattachment/[email or web address]/Critical-Care-Pain-Observation-Tool-(CPOT)      PAIN AD Score: 0    http://geriatrictoolkit.[facility]/cog/painad.pdf (press ctrl +  left click to view)    Dyspnea:                           [ ]Mild [ ]Moderate [ ]Severe  Anxiety:                             [ ]Mild [ ]Moderate [ ]Severe  Fatigue:                             [ ]Mild [ ]Moderate [ ]Severe  Nausea:                             [ ]Mild [ ]Moderate [ ]Severe  Loss of appetite:              [ ]Mild [ ]Moderate [ ]Severe  Constipation:                    [ ]Mild [ ]Moderate [ ]Severe    Other Symptoms:  [ ]All other review of systems negative     Palliative Performance Status Version 2:    20     %    http://npcrc.org/files/news/palliative_performance_scale_ppsv2.pdf  PHYSICAL EXAM:  Vital Signs Last 24 Hrs  T(C): 37.6 (15 Mar 2021 17:25), Max: 37.6 (15 Mar 2021 17:25)  T(F): 99.6 (15 Mar 2021 17:25), Max: 99.6 (15 Mar 2021 17:25)  HR: 108 (15 Mar 2021 18:15) (85 - 110)  BP: 153/80 (15 Mar 2021 18:15) (137/72 - 153/80)  BP(mean): --  RR: 20 (15 Mar 2021 17:25) (20 - 20)  SpO2: 99% (15 Mar 2021 17:25) (95% - 99%)    GENERAL:  [ ]Alert  [ ]Oriented x   [x ]Lethargic  [ ]Cachexia  [ ]Unarousable  [ ]Verbal  [ ]Non-Verbal  Behavioral:   [ ] Anxiety  [ ] Delirium [ ] Agitation [ ] Other  HEENT:  [ ]Normal   [ ]Dry mouth   [ ]ET Tube/Trach  [ ]Oral lesions  PULMONARY:   [ ]Clear [ ]Tachypnea  [ ]Audible excessive secretions   [ ]Rhonchi        [ ]Right [ ]Left [ ]Bilateral  [ ]Crackles        [ ]Right [ ]Left [ ]Bilateral  [ ]Wheezing     [ ]Right [ ]Left [ ]Bilateral  [x ]Diminished breath sounds [ ]right [ ]left [ x]bilateral  [ ] Gurgling breath sounds   CARDIOVASCULAR:    [ ]Regular [x ]Irregular [ x]Tachy  [ ]Ja [ ]Murmur [ ]Other  GASTROINTESTINAL:  [ x]Soft  [ ]Distended   [ ]+BS  [ ]Non tender [ ]Tender  [ ]PEG [ ]OGT/ NGT  Last BM:   GENITOURINARY:  [ ]Normal [x ] Incontinent   [ ]Oliguria/Anuria   [ ]Delgado  MUSCULOSKELETAL:   [ ]Normal   [x ]Weakness  [ ]Bed/Wheelchair bound [ ]Edema  NEUROLOGIC:   [ ]No focal deficits  [x ]Cognitive impairment  [x ]Dysphagia [ ]Dysarthria [x ]Right jovana-Paresis [ x]Other: Aphasia. Not f/u commands   SKIN:   [x ]Normal    [ ]Rash  [ ]Pressure ulcer(s)       Present on admission [ ]y [ ]n    CRITICAL CARE:  [ ] Shock Present  [ ]Septic [ ]Cardiogenic [ ]Neurologic [ ]Hypovolemic  [ ]  Vasopressors [ ]  Inotropes   [ ]Respiratory failure present [ ]Mechanical ventilation [ ]Non-invasive ventilatory support [ ]High flow    [ ]Acute  [ ]Chronic [ ]Hypoxic  [ ]Hypercarbic [ ]Other  [ ]Other organ failure     LABS: no new labs.                                RADIOLOGY & ADDITIONAL STUDIES:    c< from: CT Head No Cont (03.14.21 @ 10:05) >    EXAM:  CT BRAIN                            PROCEDURE DATE:  03/14/2021  IMPRESSION:  Evolving left middle cerebral artery territorial infarct increased in size.    No gross acute intracranial hemorrhage                ANNIE COLEY MD; Attending Radiologist  This document has been electronically signed. Mar 14 2021 10:10AM    < end of copied text >    EXAM:  CT PERFUSION W MAPS IC                            PROCEDURE DATE:  03/10/2021    CTA brain:  Lack of flow related enhancement of the left M1 segment, the left MCA bifurcation, and proximal left M2 branches. Decreased flow related enhancement of more distal left MCA branches. Findings are consistent with the presence of intraluminal thrombus or embolus.    No vascular aneurysm. No AVM.    CTA neck:  No flow-limiting stenosis or evidence for arterial dissection.    CT brain perfusion:  Cerebral blood flow less than 30% = 0 mL, therefore no core infarct is predicted.    Tmax greater than 6 seconds = 123 mL and involves much of the left MCA territory. This indicates territory at continued ischemic risk and the presence of neurologic symptoms.    Dr. Deluna discussed these findings with neurology stroke JONATHAN Rodriguez on 3/10/2021 1:23 PM with read back.                  PEDRO LUIS DELUNA MD; Attending Radiologist  This document has been electronically signed. Mar 10 2021  1:43PM    < end of copied text >  PROTEIN CALORIE MALNUTRITION PRESENT: [ ]mild [ ]moderate [ ]severe [ ]underweight [ ]morbid obesity  https://www.andeal.org/vault/2440/web/files/ONC/Table_Clinical%20Characteristics%20to%20Document%20Malnutrition-White%20JV%20et%20al%174823.pdf    Height (cm): 182.9 (03-10-21 @ 12:35), 182.9 (12-28-20 @ 11:09), 182.9 (09-02-20 @ 22:15)  Weight (kg): 67.7 (03-10-21 @ 13:22), 77.1 (12-28-20 @ 11:09), 71.3 (09-02-20 @ 22:15)  BMI (kg/m2): 20.2 (03-10-21 @ 13:22), 23 (03-10-21 @ 12:35), 23 (12-28-20 @ 11:09)    [ ]PPSV2 < or = to 30% [ ]significant weight loss  [ ]poor nutritional intake  [ ]anasarca     Albumin, Serum: 3.4 g/dL (03-10-21 @ 12:53)   [ ]Artificial Nutrition      REFERRALS:   [ ]Chaplaincy  [ ]Hospice  [ ]Child Life  [ ]Social Work  [ ]Case management [ ]Holistic Therapy     Goals of Care Document:  HPI:  89 M w/ afib (Not on eliquis per the wife) CABG, PPM, HTN, BPH, and dementia aaox1. Per EMR, "wife states that her  aware of everyone in the family, he knows who the president..." and "...is able to go to the bathroom, w/ a walker and assistance from the nursing aide." However, earlier today, the patient's aid witnessed that the pt wasn't moving his R arm. His wife, called her son, who is a Neurologist and indicated to give him an aspirin. The patient was aphasic. CT on admission showed Left MCA stroke. Palliative care was consulted for GOC and PCU eval.     3/11 Patient was lethargic and still not able to f/u commands. Neuro deficit is persistent   3/15 Though more alert, still not able to f/u commands and with aphasia, dysphagia and oropharyngeal secretions.         PERTINENT PM/SXH:   History of polymyalgia rheumatica    HTN (hypertension)    BPH (benign prostatic hyperplasia)    Atrial fibrillation      H/O cardiac pacemaker    S/P CABG x 5      FAMILY HISTORY:  No pertinent family history in first degree relatives    MEDICATIONS  (STANDING):  artificial  tears Solution 1 Drop(s) Both EYES three times a day  cadexomer iodine 0.9% Gel 1 Application(s) Topical two times a day  collagenase Ointment 1 Application(s) Topical daily  dextrose 5% + sodium chloride 0.45%. 1000 milliLiter(s) (50 mL/Hr) IV Continuous <Continuous>  enoxaparin Injectable 40 milliGRAM(s) SubCutaneous daily  metoprolol tartrate Injectable 5 milliGRAM(s) IV Push every 6 hours    MEDICATIONS  (PRN):  acetaminophen   Tablet .. 650 milliGRAM(s) Oral every 6 hours PRN Temp greater or equal to 38C (100.4F), Mild Pain (1 - 3), Moderate Pain (4 - 6)  glycopyrrolate Injectable 0.2 milliGRAM(s) IV Push every 6 hours PRN Oropharyngeal secretions      ITEMS NOT CHECKED ARE NOT PRESENT    SOCIAL HISTORY:   Significant other/partner[ x]   Children[x ]  Zoroastrian/Spirituality:  Substance hx:  [ ]   Tobacco hx:  [ ]   Alcohol hx: [ ]   Home Opioid hx:  [ ] I-Stop Reference No:  Living Situation: [ x]Home  [ ]Long term care  [ ]Rehab [ ]Other  Lives with wife and has 12 h HHA.    ADVANCE DIRECTIVES:    DNR  MOLST  [x ]  Living Will  [ ]   DECISION MAKER(s):  [ ] Health Care Proxy(s)  [ x] Surrogate(s)  [ ] Guardian           Name(s): Phone Number(s): Wife     BASELINE (I)ADL(s) (prior to admission):  [place]: [ ]Total  [ ] Moderate [x ]Dependent    Allergies    No Known Allergies    Intolerances    MEDICATIONS  (STANDING):    MEDICATIONS  (PRN):    PRESENT SYMPTOMS: [ x]Unable to obtain due to poor mentation   Source if other than patient:  [ ]Family   [ ]Team     Pain: [ ]yes [x ]no  QOL impact -   Location -                    Aggravating factors -  Quality -  Radiation -  Timing-  Severity (0-10 scale):  Minimal acceptable level (0-10 scale):     CPOT:    https://www.[facility].org/getattachment/[email or web address]/Critical-Care-Pain-Observation-Tool-(CPOT)      PAIN AD Score: 0    http://geriatrictoolkit.[email or web address]/cog/painad.pdf (press ctrl +  left click to view)    Dyspnea:                           [ ]Mild [ ]Moderate [ ]Severe  Anxiety:                             [ ]Mild [ ]Moderate [ ]Severe  Fatigue:                             [ ]Mild [ ]Moderate [ ]Severe  Nausea:                             [ ]Mild [ ]Moderate [ ]Severe  Loss of appetite:              [ ]Mild [ ]Moderate [ ]Severe  Constipation:                    [ ]Mild [ ]Moderate [ ]Severe    Other Symptoms:  [ ]All other review of systems negative     Palliative Performance Status Version 2:    20     %    http://npcrc.org/files/news/palliative_performance_scale_ppsv2.pdf  PHYSICAL EXAM:  Vital Signs Last 24 Hrs  T(C): 37.6 (15 Mar 2021 17:25), Max: 37.6 (15 Mar 2021 17:25)  T(F): 99.6 (15 Mar 2021 17:25), Max: 99.6 (15 Mar 2021 17:25)  HR: 108 (15 Mar 2021 18:15) (85 - 110)  BP: 153/80 (15 Mar 2021 18:15) (137/72 - 153/80)  BP(mean): --  RR: 20 (15 Mar 2021 17:25) (20 - 20)  SpO2: 99% (15 Mar 2021 17:25) (95% - 99%)    GENERAL:  [ ]Alert  [ ]Oriented x   [x ]Lethargic but slightly more alert than compared with prior visits [ ]Cachexia  [ ]Unarousable  [ ]Verbal  [ ]Non-Verbal  Behavioral:   [ ] Anxiety  [ ] Delirium [ ] Agitation [ ] Other  HEENT:  [ ]Normal   [ ]Dry mouth   [ ]ET Tube/Trach  [ ]Oral lesions  PULMONARY:   [ ]Clear [ ]Tachypnea  [ ]Audible excessive secretions   [ ]Rhonchi        [ ]Right [ ]Left [ ]Bilateral  [ ]Crackles        [ ]Right [ ]Left [ ]Bilateral  [ ]Wheezing     [ ]Right [ ]Left [ ]Bilateral  [x ]Diminished breath sounds [ ]right [ ]left [ x]bilateral  [x ] Gurgling breath sounds   CARDIOVASCULAR:    [ ]Regular [x ]Irregular [ x]Tachy  [ ]Ja [ ]Murmur [ ]Other  GASTROINTESTINAL:  [ x]Soft  [ ]Distended   [ ]+BS  [ ]Non tender [ ]Tender  [ ]PEG [ ]OGT/ NGT  Last BM:   GENITOURINARY:  [ ]Normal [x ] Incontinent   [ ]Oliguria/Anuria   [ ]Delgado  MUSCULOSKELETAL:   [ ]Normal   [x ]Weakness  [ ]Bed/Wheelchair bound [ ]Edema  NEUROLOGIC:   [ ]No focal deficits  [x ]Cognitive impairment  [x ]Dysphagia [ ]Dysarthria [x ]Right jovana-Paresis [ x]Other: Aphasia. Not f/u commands   SKIN:   [x ]Normal    [ ]Rash  [ ]Pressure ulcer(s)       Present on admission [ ]y [ ]n    CRITICAL CARE:  [ ] Shock Present  [ ]Septic [ ]Cardiogenic [ ]Neurologic [ ]Hypovolemic  [ ]  Vasopressors [ ]  Inotropes   [ ]Respiratory failure present [ ]Mechanical ventilation [ ]Non-invasive ventilatory support [ ]High flow    [ ]Acute  [ ]Chronic [ ]Hypoxic  [ ]Hypercarbic [ ]Other  [ ]Other organ failure     LABS: no new labs.                                RADIOLOGY & ADDITIONAL STUDIES:    c< from: CT Head No Cont (03.14.21 @ 10:05) >    EXAM:  CT BRAIN                            PROCEDURE DATE:  03/14/2021  IMPRESSION:  Evolving left middle cerebral artery territorial infarct increased in size.    No gross acute intracranial hemorrhage                ANNIE COLEY MD; Attending Radiologist  This document has been electronically signed. Mar 14 2021 10:10AM    < end of copied text >    EXAM:  CT PERFUSION W MAPS IC                            PROCEDURE DATE:  03/10/2021    CTA brain:  Lack of flow related enhancement of the left M1 segment, the left MCA bifurcation, and proximal left M2 branches. Decreased flow related enhancement of more distal left MCA branches. Findings are consistent with the presence of intraluminal thrombus or embolus.    No vascular aneurysm. No AVM.    CTA neck:  No flow-limiting stenosis or evidence for arterial dissection.    CT brain perfusion:  Cerebral blood flow less than 30% = 0 mL, therefore no core infarct is predicted.    Tmax greater than 6 seconds = 123 mL and involves much of the left MCA territory. This indicates territory at continued ischemic risk and the presence of neurologic symptoms.    Dr. Sorensen discussed these findings with neurology stroke PA Jennifer on 3/10/2021 1:23 PM with read back.                  PEDRO LUIS SORENSEN MD; Attending Radiologist  This document has been electronically signed. Mar 10 2021  1:43PM    < end of copied text >  PROTEIN CALORIE MALNUTRITION PRESENT: [ ]mild [ ]moderate [ ]severe [ ]underweight [ ]morbid obesity  https://www.andeal.org/vault/2440/web/files/ONC/Table_Clinical%20Characteristics%20to%20Document%20Malnutrition-White%20JV%20et%20al%858754.pdf    Height (cm): 182.9 (03-10-21 @ 12:35), 182.9 (12-28-20 @ 11:09), 182.9 (09-02-20 @ 22:15)  Weight (kg): 67.7 (03-10-21 @ 13:22), 77.1 (12-28-20 @ 11:09), 71.3 (09-02-20 @ 22:15)  BMI (kg/m2): 20.2 (03-10-21 @ 13:22), 23 (03-10-21 @ 12:35), 23 (12-28-20 @ 11:09)    [ ]PPSV2 < or = to 30% [ ]significant weight loss  [ ]poor nutritional intake  [ ]anasarca     Albumin, Serum: 3.4 g/dL (03-10-21 @ 12:53)   [ ]Artificial Nutrition      REFERRALS:   [ ]Chaplaincy  [ ]Hospice  [ ]Child Life  [ ]Social Work  [ ]Case management [ ]Holistic Therapy     Goals of Care Document:

## 2021-03-15 NOTE — PROGRESS NOTE ADULT - ASSESSMENT
89 year old male with PMH of Afib (Not on eliquis per the wife), CABG, PPM, HTN, BPH, PMR, and dementia aaox1 per chart documentation; brought to [facility] with new right-sided weakness; found to have a left MCA stroke on CT head, patient is aphasic. Mild leukocytosis on labs. Palliative discussed with wife and patient is now DNR/DNI. Admitted for possible transfer to home hospice.

## 2021-03-15 NOTE — PROGRESS NOTE ADULT - PROBLEM SELECTOR PLAN 2
With recurrent secretions.   Will add Glyco 0.2 mg IV q 8 ATC   Glyco PRN.   Aspiration precautions.

## 2021-03-15 NOTE — PROGRESS NOTE ADULT - ASSESSMENT
89 M w/ afib (Not on eliquis per the wife) CABG, PPM, HTN, BPH, and dementia aaox1 p/w left sided weakness and aphasia. CT on admission showed Left MCA stroke. Palliative care was consulted for GOC and PCU eval.     Full note to f/u.                        89 M w/ afib (Not on eliquis per the wife) CABG, PPM, HTN, BPH, and dementia aaox1 p/w left sided weakness and aphasia. CT on admission showed Left MCA stroke. Palliative care was consulted for GOC and PCU eval.

## 2021-03-15 NOTE — PROGRESS NOTE ADULT - PROBLEM SELECTOR PLAN 6
Met with the patient's wife that indicated she is considering inpatient hospice or PCU if that were to be an option. However, she wanted to further discuss new CT results and re-addressing goals with her son before making a final determination. I indicated that PCU or inpatient hospice may be an option; however, that the time he may spend in inpatient hospice/PCU will be bound to his symptoms and degree of clinical declines.   We also discuss about IV hydration and the possibility of stopping IVF if deciding for a symptoms only approach.

## 2021-03-15 NOTE — PROGRESS NOTE ADULT - PROBLEM SELECTOR PLAN 7
Will continue to discuss about GOC. Will continue to discuss about GOC.        Raymundo Cohn MD   Geriatrics and Palliative Care (GAP) Consult Service    of Geriatric and Palliative Medicine  [facility]      Please page the following number for clinical matters between the hours of 9 am and 5 pm from Monday through Friday : [phone]    After 5pm and on weekends, please see the contact information below:    In the event of newly developing, evolving, or worsening symptoms, please contact the Palliative Medicine team via pager (if the patient is at [facility] #[phone] or if the patient is at [facility] #[phone]) The Geriatric and Palliative Medicine service has coverage 24 hours a day/ 7 days a week to provide medical recommendations regarding symptom management needs via telephone

## 2021-03-15 NOTE — PROGRESS NOTE ADULT - SUBJECTIVE AND OBJECTIVE BOX
Patient is a 89y old  Male who presents with a chief complaint of Right sided weakness (15 Mar 2021 08:29)      SUBJECTIVE / OVERNIGHT EVENTS:    No acute overnight events.    ROS: (  ) Fever, (  )Chills,  (  )Nausea/Vomiting, (  ) Cough, (  )Shortness of breath, (  )Chest Pain    MEDICATIONS  (STANDING):  artificial  tears Solution 1 Drop(s) Both EYES three times a day  cadexomer iodine 0.9% Gel 1 Application(s) Topical two times a day  collagenase Ointment 1 Application(s) Topical daily  dextrose 5% + sodium chloride 0.45%. 1000 milliLiter(s) (50 mL/Hr) IV Continuous <Continuous>  enoxaparin Injectable 40 milliGRAM(s) SubCutaneous daily  metoprolol tartrate Injectable 5 milliGRAM(s) IV Push every 6 hours    MEDICATIONS  (PRN):  acetaminophen   Tablet .. 650 milliGRAM(s) Oral every 6 hours PRN Temp greater or equal to 38C (100.4F), Mild Pain (1 - 3), Moderate Pain (4 - 6)  glycopyrrolate Injectable 0.2 milliGRAM(s) IV Push every 6 hours PRN Oropharyngeal secretions      T(C): 37 (03-15 @ 09:19), Max: 37.3 (03-14 @ 16:42)   HR: 99   BP: 139/88   RR: 20   SpO2: 97%    PHYSICAL EXAM:  GENERAL: NAD, well-developed  CHEST/LUNG: Clear to auscultation bilaterally; No wheeze  HEART: Regular rate and rhythm; No murmurs, rubs, or gallops  ABDOMEN: Soft, Nontender, Nondistended; Bowel sounds present  EXTREMITIES:  2+ Peripheral Pulses, No clubbing, cyanosis, or edema  PSYCH: AAOx3  NEUROLOGY: non-focal    LABS:              CAPILLARY BLOOD GLUCOSE          RADIOLOGY & ADDITIONAL TESTS:    Imaging Personally Reviewed:  Consultant(s) Notes Reviewed:    Care Discussed with Consultants/Other Providers:   Patient is a 89y old  Male who presents with a chief complaint of Right sided weakness (15 Mar 2021 08:29)      SUBJECTIVE / OVERNIGHT EVENTS:    No acute overnight events.    unable to assess ROS due to cognitive impairment    MEDICATIONS  (STANDING):  artificial  tears Solution 1 Drop(s) Both EYES three times a day  cadexomer iodine 0.9% Gel 1 Application(s) Topical two times a day  collagenase Ointment 1 Application(s) Topical daily  dextrose 5% + sodium chloride 0.45%. 1000 milliLiter(s) (50 mL/Hr) IV Continuous <Continuous>  enoxaparin Injectable 40 milliGRAM(s) SubCutaneous daily  metoprolol tartrate Injectable 5 milliGRAM(s) IV Push every 6 hours    MEDICATIONS  (PRN):  acetaminophen   Tablet .. 650 milliGRAM(s) Oral every 6 hours PRN Temp greater or equal to 38C (100.4F), Mild Pain (1 - 3), Moderate Pain (4 - 6)  glycopyrrolate Injectable 0.2 milliGRAM(s) IV Push every 6 hours PRN Oropharyngeal secretions      T(C): 37 (03-15 @ 09:19), Max: 37.3 (03-14 @ 16:42)   HR: 99   BP: 139/88   RR: 20   SpO2: 97%    PHYSICAL EXAM:  GENERAL: NAD, well-developed  CHEST/LUNG: guggling trasmitited upon auscultation. RHoncii  HEART: Regular rate and rhythm; No murmurs, rubs, or gallops  ABDOMEN: Soft, Nontender, Nondistended; Bowel sounds present  EXTREMITIES:  2+ Peripheral Pulses, No clubbing, cyanosis, or edema  PSYCH: AAOx0  NEUROLOGY: non-focal    LABS:              CAPILLARY BLOOD GLUCOSE          RADIOLOGY & ADDITIONAL TESTS:    Imaging Personally Reviewed:  Consultant(s) Notes Reviewed:    Care Discussed with Consultants/Other Providers:

## 2021-03-15 NOTE — PROGRESS NOTE ADULT - SUBJECTIVE AND OBJECTIVE BOX
Neurology Progress Note    S: Patient seen and examined. hospice palnning in progress. more alert tracking but still with dense HP    Medication:  MEDICATIONS  (STANDING):  artificial  tears Solution 1 Drop(s) Both EYES three times a day  cadexomer iodine 0.9% Gel 1 Application(s) Topical two times a day  collagenase Ointment 1 Application(s) Topical daily  dextrose 5% + sodium chloride 0.45%. 1000 milliLiter(s) (50 mL/Hr) IV Continuous <Continuous>  enoxaparin Injectable 40 milliGRAM(s) SubCutaneous daily  metoprolol tartrate Injectable 5 milliGRAM(s) IV Push every 6 hours    MEDICATIONS  (PRN):  acetaminophen   Tablet .. 650 milliGRAM(s) Oral every 6 hours PRN Temp greater or equal to 38C (100.4F), Mild Pain (1 - 3), Moderate Pain (4 - 6)  glycopyrrolate Injectable 0.2 milliGRAM(s) IV Push every 6 hours PRN Oropharyngeal secretions        Vitals:  Vital Signs Last 24 Hrs  T(C): 37 (15 Mar 2021 09:19), Max: 37.3 (14 Mar 2021 16:42)  T(F): 98.6 (15 Mar 2021 09:19), Max: 99.1 (14 Mar 2021 16:42)  HR: 110 (15 Mar 2021 09:19) (93 - 110)  BP: 149/87 (15 Mar 2021 09:19) (137/72 - 150/86)  BP(mean): --  RR: 20 (15 Mar 2021 09:19) (18 - 20)  SpO2: 96% (15 Mar 2021 09:19) (91% - 97%)    General Exam:   General Appearance: Appropriately dressed and in no acute distress       Head: Normocephalic, atraumatic and no dysmorphic features  Ear, Nose, and Throat: Moist mucous membranes   CVS: S1S2+  Resp: No SOB, no wheeze or rhonchi  Abd: soft NTND  Extremities: No edema, no cyanosis  Skin: No bruises, no rashes     Neurological Exam:  MS: eyes open, alert, no verbal output, withdraws and grimaces to noxious stimuli, attempts to follwo and speak but no verbal.    CN: noted mild left eye exotropia, able to cross midline to right today, pupils 2mm reactive to light, R HHA, R moderate facial droop  Motor: left arm and leg move spontaneously 3/5; right arm and leg minimal movement 0/5 essentially plegic  Cerebellar: unable   Gait: bedbound    I personally reviewed the below data/images/labs:          Urinalysis Basic - ( 11 Mar 2021 04:49 )    Color: Yellow / Appearance: Slightly Turbid / S.032 / pH: x  Gluc: x / Ketone: Negative  / Bili: Negative / Urobili: Negative   Blood: x / Protein: 30 mg/dL / Nitrite: Negative   Leuk Esterase: Large / RBC: 61 /hpf /  /HPF   Sq Epi: x / Non Sq Epi: 0 /hpf / Bacteria: Many    CTA brain:  Lack of flow related enhancement of the left M1 segment, the left MCA bifurcation, and proximal left M2 branches. Decreased flow related enhancement of more distal left MCA branches. Findings are consistent with the presence of intraluminal thrombus or embolus.    No vascular aneurysm. No AVM.    CTA neck:  No flow-limiting stenosis or evidence for arterial dissection.    CT brain perfusion:  Cerebral blood flow less than 30% = 0 mL, therefore no core infarct is predicted.    Tmax greater than 6 seconds = 123 mL and involves much of the left MCA territory. This indicates territory at continued ischemic risk and the presence of neurologic symptoms.    < end of copied text >  < from: CT Angio Neck w/ IV Cont (03.10.21 @ 13:21) >  CTA brain:  Lack of flow related enhancement of the left M1 segment, the left MCA bifurcation, and proximal left M2 branches. Decreased flow related enhancement of more distal left MCA branches. Findings are consistent with the presence of intraluminal thrombus or embolus.    No vascular aneurysm. No AVM.    CTA neck:  No flow-limiting stenosis or evidence for arterial dissection.    CT brain perfusion:  Cerebral blood flow less than 30% = 0 mL, therefore no core infarct is predicted.    Tmax greater than 6 seconds = 123 mL and involves much of the left MCA territory. This indicates territory at continued ischemic risk and the presence of neurologic symptoms.      < from: CT Head No Cont (21 @ 10:05) >    EXAM:  CT BRAIN                            PROCEDURE DATE:  2021            INTERPRETATION:  CLINICAL STATEMENT: Left MCA infarct    TECHNIQUE: CT of the head was performed without IV contrast.  RAPID artificial intelligence was utilized for the preliminary evaluation of intracranial hemorrhage.    COMPARISON: CT head 3/10/2021    FINDINGS:  There is moderate diffuse parenchymal volume loss. There are areas of low attenuation in the periventricular white matter likely related to mild chronic microvascular ischemic changes.    Evolving left middle cerebral artery territorial infarct noted increased in size, largest area now measuring approximately 6.1 x 4.0 cm.    There is no acute intracranial hemorrhage, or midline shift. There is nohydrocephalus.    The cranium is intact.    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    No gross acute intracranial hemorrhage    ANNIE COLEY MD; Attending Radiologist  This document has been electronically signed. Mar 14 2021 10:10AM    < end of copied text >

## 2021-03-16 NOTE — PROGRESS NOTE ADULT - SUBJECTIVE AND OBJECTIVE BOX
Podiatry pager #: [phone]/ [phone]    Patient is a 89y old  Male who presents with a chief complaint of Right sided weakness (16 Mar 2021 18:50) Patient seen today for follow-up of bilateral foot wounds.       INTERVAL HPI/OVERNIGHT EVENTS:  Patient seen and evaluated at bedside.  Pt is resting comfortable in NAD. Denies N/V/F/C.  Pain rated at X/10    Allergies    No Known Allergies    Intolerances        Vital Signs Last 24 Hrs  T(C): 36.7 (17 Mar 2021 00:25), Max: 37.7 (16 Mar 2021 16:10)  T(F): 98.1 (17 Mar 2021 00:25), Max: 99.9 (16 Mar 2021 16:10)  HR: 100 (17 Mar 2021 06:50) (75 - 109)  BP: 111/62 (17 Mar 2021 06:50) (106/75 - 140/84)  BP(mean): 78 (17 Mar 2021 06:50) (78 - 91)  RR: 20 (17 Mar 2021 00:55) (20 - 20)  SpO2: 93% (17 Mar 2021 00:55) (93% - 98%)    acetaminophen   Tablet .. 650 milliGRAM(s) Oral every 6 hours PRN  artificial  tears Solution 1 Drop(s) Both EYES three times a day  cadexomer iodine 0.9% Gel 1 Application(s) Topical two times a day  collagenase Ointment 1 Application(s) Topical daily  dextrose 5% + sodium chloride 0.45%. 1000 milliLiter(s) IV Continuous <Continuous>  enoxaparin Injectable 40 milliGRAM(s) SubCutaneous daily  glycopyrrolate Injectable 0.2 milliGRAM(s) IV Push every 8 hours  glycopyrrolate Injectable 0.2 milliGRAM(s) IV Push every 6 hours PRN  metoprolol tartrate Injectable 5 milliGRAM(s) IV Push every 6 hours      LABS:              CAPILLARY BLOOD GLUCOSE          Lower Extremity Physical Exam:  Vascular: DP/PT 0/4 B/L, CFT <3 seconds B/L, Temperature gradient warm to cool B/L  Neuro: Epicritic sensation present to the level of toes B/L  Musculoskeletal/Ortho: unremarkable  Skin: R heel fibrous wound measuring 2x3 cm down to bone w/ no drainage and no signs of infection; L heel fibrous wound mearuing 4x3 down to bone w/ no drainage and no signs of active infection both secondary to pressure present on admission     RADIOLOGY & ADDITIONAL TESTS:

## 2021-03-16 NOTE — PROGRESS NOTE ADULT - PROBLEM SELECTOR PLAN 9
-Palliative team discussed with patient's wife. -Will take a conservative approach. -Patient DNR/DNI - MOLST in chart. F/U hospice referral placed. -Palliative f/u.   -Wife wants some IVF's. But doesn't want blood draws. She wants to keep him NPO for now given risk of aspiration, but doesn't want pleasure feeds or tube feedings (no NGT) at this time. Ok with holding non-essential medications. Considering home hospice, knows prognosis guarded. GOC discussion on 3/11 - family wants to wait for 3 more days, if patient doesn't improve then open to discussing hospice again. -Palliative team discussed with patient's wife. -Will take a conservative approach. -Patient DNR/DNI - MOLST in chart. F/U hospice referral placed. -Palliative f/u.   -Wife wants some IVF's. But doesn't want blood draws. She wants to keep him NPO for now given risk of aspiration, but doesn't want pleasure feeds or tube feedings (no NGT) at this time. Ok with holding non-essential medications. Considering home hospice, knows prognosis guarded.

## 2021-03-16 NOTE — PROGRESS NOTE ADULT - SUBJECTIVE AND OBJECTIVE BOX
Neurology Progress Note    S: Patient seen and examined. hospice palnning in progress. more alert tracking but still with dense HP    Medication:  MEDICATIONS  (STANDING):  artificial  tears Solution 1 Drop(s) Both EYES three times a day  cadexomer iodine 0.9% Gel 1 Application(s) Topical two times a day  collagenase Ointment 1 Application(s) Topical daily  dextrose 5% + sodium chloride 0.45%. 1000 milliLiter(s) (50 mL/Hr) IV Continuous <Continuous>  enoxaparin Injectable 40 milliGRAM(s) SubCutaneous daily  glycopyrrolate Injectable 0.2 milliGRAM(s) IV Push every 8 hours  metoprolol tartrate Injectable 5 milliGRAM(s) IV Push every 6 hours    MEDICATIONS  (PRN):  acetaminophen   Tablet .. 650 milliGRAM(s) Oral every 6 hours PRN Temp greater or equal to 38C (100.4F), Mild Pain (1 - 3), Moderate Pain (4 - 6)  glycopyrrolate Injectable 0.2 milliGRAM(s) IV Push every 6 hours PRN Oropharyngeal secretions    Vitals:  Vital Signs Last 24 Hrs  T(C): 36.8 (16 Mar 2021 05:27), Max: 37.6 (15 Mar 2021 17:25)  T(F): 98.2 (16 Mar 2021 05:27), Max: 99.6 (15 Mar 2021 17:25)  HR: 98 (16 Mar 2021 05:27) (85 - 110)  BP: 102/71 (16 Mar 2021 05:27) (102/71 - 153/80)  BP(mean): --  RR: 20 (16 Mar 2021 05:27) (20 - 20)  SpO2: 98% (16 Mar 2021 05:27) (97% - 99%)      General Exam:   General Appearance: Appropriately dressed and in no acute distress       Head: Normocephalic, atraumatic and no dysmorphic features  Ear, Nose, and Throat: Moist mucous membranes   CVS: S1S2+  Resp: No SOB, no wheeze or rhonchi  Abd: soft NTND  Extremities: No edema, no cyanosis  Skin: No bruises, no rashes     Neurological Exam:  MS: eyes open, alert, no verbal output, withdraws and grimaces to noxious stimuli, attempts to follwo and speak but no verbal.    CN: noted mild left eye exotropia, able to cross midline to right today, pupils 2mm reactive to light, R HHA, R moderate facial droop  Motor: left arm and leg move spontaneously 3/5; right arm and leg minimal movement 0/5 essentially plegic  Cerebellar: unable   Gait: bedbound    I personally reviewed the below data/images/labs:   NO NEW LABS        Urinalysis Basic - ( 11 Mar 2021 04:49 )    Color: Yellow / Appearance: Slightly Turbid / S.032 / pH: x  Gluc: x / Ketone: Negative  / Bili: Negative / Urobili: Negative   Blood: x / Protein: 30 mg/dL / Nitrite: Negative   Leuk Esterase: Large / RBC: 61 /hpf /  /HPF   Sq Epi: x / Non Sq Epi: 0 /hpf / Bacteria: Many    CTA brain:  Lack of flow related enhancement of the left M1 segment, the left MCA bifurcation, and proximal left M2 branches. Decreased flow related enhancement of more distal left MCA branches. Findings are consistent with the presence of intraluminal thrombus or embolus.    No vascular aneurysm. No AVM.    CTA neck:  No flow-limiting stenosis or evidence for arterial dissection.    CT brain perfusion:  Cerebral blood flow less than 30% = 0 mL, therefore no core infarct is predicted.    Tmax greater than 6 seconds = 123 mL and involves much of the left MCA territory. This indicates territory at continued ischemic risk and the presence of neurologic symptoms.    < end of copied text >  < from: CT Angio Neck w/ IV Cont (03.10.21 @ 13:21) >  CTA brain:  Lack of flow related enhancement of the left M1 segment, the left MCA bifurcation, and proximal left M2 branches. Decreased flow related enhancement of more distal left MCA branches. Findings are consistent with the presence of intraluminal thrombus or embolus.    No vascular aneurysm. No AVM.    CTA neck:  No flow-limiting stenosis or evidence for arterial dissection.    CT brain perfusion:  Cerebral blood flow less than 30% = 0 mL, therefore no core infarct is predicted.    Tmax greater than 6 seconds = 123 mL and involves much of the left MCA territory. This indicates territory at continued ischemic risk and the presence of neurologic symptoms.      < from: CT Head No Cont (21 @ 10:05) >    EXAM:  CT BRAIN                            PROCEDURE DATE:  2021            INTERPRETATION:  CLINICAL STATEMENT: Left MCA infarct    TECHNIQUE: CT of the head was performed without IV contrast.  RAPID artificial intelligence was utilized for the preliminary evaluation of intracranial hemorrhage.    COMPARISON: CT head 3/10/2021    FINDINGS:  There is moderate diffuse parenchymal volume loss. There are areas of low attenuation in the periventricular white matter likely related to mild chronic microvascular ischemic changes.    Evolving left middle cerebral artery territorial infarct noted increased in size, largest area now measuring approximately 6.1 x 4.0 cm.    There is no acute intracranial hemorrhage, or midline shift. There is nohydrocephalus.    The cranium is intact.    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    No gross acute intracranial hemorrhage    ANNIE COLEY MD; Attending Radiologist  This document has been electronically signed. Mar 14 2021 10:10AM    < end of copied text >

## 2021-03-16 NOTE — PROGRESS NOTE ADULT - ASSESSMENT
89 year old male with PMH of Afib (Not on eliquis per the wife), CABG, PPM, HTN, BPH, PMR, and dementia aaox1 per chart documentation; brought to [facility] with new right-sided weakness; found to have a left MCA stroke on CT head, patient is aphasic. Mild leukocytosis on labs. Palliative discussed with wife and patient is now DNR/DNI. Admitted for possible transfer to home hospice.

## 2021-03-16 NOTE — PROGRESS NOTE ADULT - PROBLEM SELECTOR PLAN 7
Will transfer to PCU when a bed becomes available.           Raymundo Cohn MD   Geriatrics and Palliative Care ([facility]) Consult Service    of Geriatric and Palliative Medicine  [facility]      Please page the following number for clinical matters between the hours of 9 am and 5 pm from Monday through Friday : [phone]    After 5pm and on weekends, please see the contact information below:    In the event of newly developing, evolving, or worsening symptoms, please contact the Palliative Medicine team via pager (if the patient is at [facility] #[phone] or if the patient is at [facility] #[phone]) The Geriatric and Palliative Medicine service has coverage 24 hours a day/ 7 days a week to provide medical recommendations regarding symptom management needs via telephone

## 2021-03-16 NOTE — PROGRESS NOTE ADULT - ASSESSMENT
89 yo M with Afib no on AC 2/2 fall, s/p PPM, CAD/CABG, dementia preMRS=4/5 p/w R HP and speech changes.  CTH/A/P with L M1 occlusion core 0 and penumbra of 123cc was out of tpa window. not a MT candidate given preMRS. CTH obtained this AM shows evolving L MCA Infarct but some sparing of the cortex noted. o/e with some improvement now still with gaze pref but can cross midline R still plegic and patient still aphasic. would still require peg if family decided against comfort   - family decided on palliative. DNR/I, hospice eval and referal made, family deciding  - hold AC, okay for asa  - if in agreement with family wishes would give ASA/statin and hypertonics to avoid shift.   - Hemoglobin A1c and lipid panel  - telemetry  - PT/OT/SS/SLP, OOBC  - check FS, glucose control <180  - GI/DVT ppx  - Counseling on diet, exercise, and medication adherence was done  - Counseling on smoking cessation and alcohol consumption offered when appropriate.  - Pain assessed and judicious use of narcotics when appropriate was discussed.    - Stroke education given when appropriate.  - Importance of fall prevention discussed.   - Differential diagnosis and plan of care discussed with patient and/or family and primary team  - Thank you for allowing me to participate in the care of this patient. Call with questions.   - DNR/I no agressive measures, hospice referral. will f/u as needed or as questions arise     Eduardo Ugarte MD  Vascular Neurology  Office: [phone]

## 2021-03-16 NOTE — PROGRESS NOTE ADULT - PROBLEM SELECTOR PLAN 6
Goals are for symptoms Tx, Complete abx for a UTI ( if a new infection presents a discussion about Abx should be done), and give IVF ( for now). No tube feeds, no diet. His wife is in the process of signing consents so he may be going as hospice. DNR/I. Will continue ASA and Metoprolol for now. See GOC above.

## 2021-03-16 NOTE — PROGRESS NOTE ADULT - PROBLEM SELECTOR PLAN 2
With recurrent secretions.   On Glyco 0.2 mg IV q 8 ATC   Glyco PRN.   Aspiration precautions. Improved.   On Glyco 0.2 mg IV q 8 ATC   Glyco PRN.   Aspiration precautions.

## 2021-03-16 NOTE — PROGRESS NOTE ADULT - PROBLEM SELECTOR PLAN 1
Not a candidate for TPA no plans for any other acute interventions.   GOC as per my GOC note dated 3/10. Not a candidate for TPA no plans for any other acute interventions.

## 2021-03-16 NOTE — PROGRESS NOTE ADULT - CONVERSATION DETAILS
His wife has already given verbalize understanding about the patient's advanced illness and poor prognosis. She indicated that goals are for symptoms Tx. However, she and her son would like to complete abx for a UTI ( if a new infection presents a discussion about Abx should be done), and give IVF ( for now). No tube feeds, no diet. They woul also like to continue ASA and Metoprolol for now.     His wife is in the process of signing hospice consents.     He is already DNR/I.     His wife agree with transferring to PCU for advance symptom monitoring and Rx.

## 2021-03-16 NOTE — PROGRESS NOTE ADULT - SUBJECTIVE AND OBJECTIVE BOX
HPI:  89 M w/ afib (Not on eliquis per the wife) CABG, PPM, HTN, BPH, and dementia aaox1. Per EMR, "wife states that her  aware of everyone in the family, he knows who the president..." and "...is able to go to the bathroom, w/ a walker and assistance from the nursing aide." However, earlier today, the patient's aid witnessed that the pt wasn't moving his R arm. His wife, called her son, who is a Neurologist and indicated to give him an aspirin. The patient was aphasic. CT on admission showed Left MCA stroke. Palliative care was consulted for GOC and PCU eval.     3/11 Patient was lethargic and still not able to f/u commands. Neuro deficit is persistent   3/15 Though more alert, still not able to f/u commands and with aphasia, dysphagia and oropharyngeal secretions.   3/16         PERTINENT PM/SXH:   History of polymyalgia rheumatica    HTN (hypertension)    BPH (benign prostatic hyperplasia)    Atrial fibrillation      H/O cardiac pacemaker    S/P CABG x 5      FAMILY HISTORY:  No pertinent family history in first degree relatives    MEDICATIONS  (STANDING):  artificial  tears Solution 1 Drop(s) Both EYES three times a day  cadexomer iodine 0.9% Gel 1 Application(s) Topical two times a day  collagenase Ointment 1 Application(s) Topical daily  dextrose 5% + sodium chloride 0.45%. 1000 milliLiter(s) (50 mL/Hr) IV Continuous <Continuous>  enoxaparin Injectable 40 milliGRAM(s) SubCutaneous daily  metoprolol tartrate Injectable 5 milliGRAM(s) IV Push every 6 hours    MEDICATIONS  (PRN):  acetaminophen   Tablet .. 650 milliGRAM(s) Oral every 6 hours PRN Temp greater or equal to 38C (100.4F), Mild Pain (1 - 3), Moderate Pain (4 - 6)  glycopyrrolate Injectable 0.2 milliGRAM(s) IV Push every 6 hours PRN Oropharyngeal secretions      ITEMS NOT CHECKED ARE NOT PRESENT    SOCIAL HISTORY:   Significant other/partner[ x]   Children[x ]  Episcopalian/Spirituality:  Substance hx:  [ ]   Tobacco hx:  [ ]   Alcohol hx: [ ]   Home Opioid hx:  [ ] I-Stop Reference No:  Living Situation: [ x]Home  [ ]Long term care  [ ]Rehab [ ]Other  Lives with wife and has 12 h HHA.    ADVANCE DIRECTIVES:    DNR  MOLST  [x ]  Living Will  [ ]   DECISION MAKER(s):  [ ] Health Care Proxy(s)  [ x] Surrogate(s)  [ ] Guardian           Name(s): Phone Number(s): Wife     BASELINE (I)ADL(s) (prior to admission):  [place]: [ ]Total  [ ] Moderate [x ]Dependent    Allergies    No Known Allergies    Intolerances    MEDICATIONS  (STANDING):    MEDICATIONS  (PRN):    PRESENT SYMPTOMS: [ x]Unable to obtain due to poor mentation   Source if other than patient:  [ ]Family   [ ]Team     Pain: [ ]yes [x ]no  QOL impact -   Location -                    Aggravating factors -  Quality -  Radiation -  Timing-  Severity (0-10 scale):  Minimal acceptable level (0-10 scale):     CPOT:    https://www.[facility].org/getattachment/[email or web address]/Critical-Care-Pain-Observation-Tool-(CPOT)      PAIN AD Score: 0    http://geriatrictoolkit.[facility]/cog/painad.pdf (press ctrl +  left click to view)    Dyspnea:                           [ ]Mild [ ]Moderate [ ]Severe  Anxiety:                             [ ]Mild [ ]Moderate [ ]Severe  Fatigue:                             [ ]Mild [ ]Moderate [ ]Severe  Nausea:                             [ ]Mild [ ]Moderate [ ]Severe  Loss of appetite:              [ ]Mild [ ]Moderate [ ]Severe  Constipation:                    [ ]Mild [ ]Moderate [ ]Severe    Other Symptoms:  [ ]All other review of systems negative     Palliative Performance Status Version 2:    20     %    http://npcrc.org/files/news/palliative_performance_scale_ppsv2.pdf  PHYSICAL EXAM:  Vital Signs Last 24 Hrs  T(C): 37.6 (15 Mar 2021 17:25), Max: 37.6 (15 Mar 2021 17:25)  T(F): 99.6 (15 Mar 2021 17:25), Max: 99.6 (15 Mar 2021 17:25)  HR: 108 (15 Mar 2021 18:15) (85 - 110)  BP: 153/80 (15 Mar 2021 18:15) (137/72 - 153/80)  BP(mean): --  RR: 20 (15 Mar 2021 17:25) (20 - 20)  SpO2: 99% (15 Mar 2021 17:25) (95% - 99%)    GENERAL:  [ ]Alert  [ ]Oriented x   [x ]Lethargic but slightly more alert than compared with prior visits [ ]Cachexia  [ ]Unarousable  [ ]Verbal  [ ]Non-Verbal  Behavioral:   [ ] Anxiety  [ ] Delirium [ ] Agitation [ ] Other  HEENT:  [ ]Normal   [ ]Dry mouth   [ ]ET Tube/Trach  [ ]Oral lesions  PULMONARY:   [ ]Clear [ ]Tachypnea  [ ]Audible excessive secretions   [ ]Rhonchi        [ ]Right [ ]Left [ ]Bilateral  [ ]Crackles        [ ]Right [ ]Left [ ]Bilateral  [ ]Wheezing     [ ]Right [ ]Left [ ]Bilateral  [x ]Diminished breath sounds [ ]right [ ]left [ x]bilateral  [x ] Gurgling breath sounds   CARDIOVASCULAR:    [ ]Regular [x ]Irregular [ x]Tachy  [ ]Ja [ ]Murmur [ ]Other  GASTROINTESTINAL:  [ x]Soft  [ ]Distended   [ ]+BS  [ ]Non tender [ ]Tender  [ ]PEG [ ]OGT/ NGT  Last BM:   GENITOURINARY:  [ ]Normal [x ] Incontinent   [ ]Oliguria/Anuria   [ ]Delgado  MUSCULOSKELETAL:   [ ]Normal   [x ]Weakness  [ ]Bed/Wheelchair bound [ ]Edema  NEUROLOGIC:   [ ]No focal deficits  [x ]Cognitive impairment  [x ]Dysphagia [ ]Dysarthria [x ]Right jovana-Paresis [ x]Other: Aphasia. Not f/u commands   SKIN:   [x ]Normal    [ ]Rash  [ ]Pressure ulcer(s)       Present on admission [ ]y [ ]n    CRITICAL CARE:  [ ] Shock Present  [ ]Septic [ ]Cardiogenic [ ]Neurologic [ ]Hypovolemic  [ ]  Vasopressors [ ]  Inotropes   [ ]Respiratory failure present [ ]Mechanical ventilation [ ]Non-invasive ventilatory support [ ]High flow    [ ]Acute  [ ]Chronic [ ]Hypoxic  [ ]Hypercarbic [ ]Other  [ ]Other organ failure     LABS: no new labs.                                RADIOLOGY & ADDITIONAL STUDIES:    c< from: CT Head No Cont (03.14.21 @ 10:05) >    EXAM:  CT BRAIN                            PROCEDURE DATE:  03/14/2021  IMPRESSION:  Evolving left middle cerebral artery territorial infarct increased in size.    No gross acute intracranial hemorrhage                ANNIE COLEY MD; Attending Radiologist  This document has been electronically signed. Mar 14 2021 10:10AM    < end of copied text >    EXAM:  CT PERFUSION W MAPS IC                            PROCEDURE DATE:  03/10/2021    CTA brain:  Lack of flow related enhancement of the left M1 segment, the left MCA bifurcation, and proximal left M2 branches. Decreased flow related enhancement of more distal left MCA branches. Findings are consistent with the presence of intraluminal thrombus or embolus.    No vascular aneurysm. No AVM.    CTA neck:  No flow-limiting stenosis or evidence for arterial dissection.    CT brain perfusion:  Cerebral blood flow less than 30% = 0 mL, therefore no core infarct is predicted.    Tmax greater than 6 seconds = 123 mL and involves much of the left MCA territory. This indicates territory at continued ischemic risk and the presence of neurologic symptoms.    Dr. Sorensen discussed these findings with neurology stroke PA Jennifer on 3/10/2021 1:23 PM with read back.                  PEDRO LUIS SORENSEN MD; Attending Radiologist  This document has been electronically signed. Mar 10 2021  1:43PM    < end of copied text >  PROTEIN CALORIE MALNUTRITION PRESENT: [ ]mild [ ]moderate [ ]severe [ ]underweight [ ]morbid obesity  https://www.andeal.org/vault/2440/web/files/ONC/Table_Clinical%20Characteristics%20to%20Document%20Malnutrition-White%20JV%20et%20al%848475.pdf    Height (cm): 182.9 (03-10-21 @ 12:35), 182.9 (12-28-20 @ 11:09), 182.9 (09-02-20 @ 22:15)  Weight (kg): 67.7 (03-10-21 @ 13:22), 77.1 (12-28-20 @ 11:09), 71.3 (09-02-20 @ 22:15)  BMI (kg/m2): 20.2 (03-10-21 @ 13:22), 23 (03-10-21 @ 12:35), 23 (12-28-20 @ 11:09)    [ ]PPSV2 < or = to 30% [ ]significant weight loss  [ ]poor nutritional intake  [ ]anasarca     Albumin, Serum: 3.4 g/dL (03-10-21 @ 12:53)   [ ]Artificial Nutrition      REFERRALS:   [ ]Chaplaincy  [ ]Hospice  [ ]Child Life  [ ]Social Work  [ ]Case management [ ]Holistic Therapy     Goals of Care Document:  HPI:  89 M w/ afib (Not on eliquis per the wife) CABG, PPM, HTN, BPH, and dementia aaox1. Per EMR, "wife states that her  aware of everyone in the family, he knows who the president..." and "...is able to go to the bathroom, w/ a walker and assistance from the nursing aide." However, earlier today, the patient's aid witnessed that the pt wasn't moving his R arm. His wife, called her son, who is a Neurologist and indicated to give him an aspirin. The patient was aphasic. CT on admission showed Left MCA stroke. Palliative care was consulted for GOC and PCU eval.     3/11 Patient was lethargic and still not able to f/u commands. Neuro deficit is persistent   3/15 Though more alert, still not able to f/u commands and with aphasia, dysphagia and oropharyngeal secretions.   3/16  Neurologic deficit is persistent. Secretions have improved.         PERTINENT PM/SXH:   History of polymyalgia rheumatica    HTN (hypertension)    BPH (benign prostatic hyperplasia)    Atrial fibrillation      H/O cardiac pacemaker    S/P CABG x 5      FAMILY HISTORY:  No pertinent family history in first degree relatives    MEDICATIONS  (STANDING):  artificial  tears Solution 1 Drop(s) Both EYES three times a day  cadexomer iodine 0.9% Gel 1 Application(s) Topical two times a day  collagenase Ointment 1 Application(s) Topical daily  dextrose 5% + sodium chloride 0.45%. 1000 milliLiter(s) (50 mL/Hr) IV Continuous <Continuous>  enoxaparin Injectable 40 milliGRAM(s) SubCutaneous daily  metoprolol tartrate Injectable 5 milliGRAM(s) IV Push every 6 hours    MEDICATIONS  (PRN):  acetaminophen   Tablet .. 650 milliGRAM(s) Oral every 6 hours PRN Temp greater or equal to 38C (100.4F), Mild Pain (1 - 3), Moderate Pain (4 - 6)  glycopyrrolate Injectable 0.2 milliGRAM(s) IV Push every 6 hours PRN Oropharyngeal secretions      ITEMS NOT CHECKED ARE NOT PRESENT    SOCIAL HISTORY:   Significant other/partner[ x]   Children[x ]  Shinto/Spirituality:  Substance hx:  [ ]   Tobacco hx:  [ ]   Alcohol hx: [ ]   Home Opioid hx:  [ ] I-Stop Reference No:  Living Situation: [ x]Home  [ ]Long term care  [ ]Rehab [ ]Other  Lives with wife and has 12 h HHA.    ADVANCE DIRECTIVES:    DNR  MOLST  [x ]  Living Will  [ ]   DECISION MAKER(s):  [ ] Health Care Proxy(s)  [ x] Surrogate(s)  [ ] Guardian           Name(s): Phone Number(s): Wife     BASELINE (I)ADL(s) (prior to admission):  [place]: [ ]Total  [ ] Moderate [x ]Dependent    Allergies    No Known Allergies    Intolerances    MEDICATIONS  (STANDING):    MEDICATIONS  (PRN):    PRESENT SYMPTOMS: [ x]Unable to obtain due to poor mentation   Source if other than patient:  [ ]Family   [ ]Team     Pain: [ ]yes [x ]no  QOL impact -   Location -                    Aggravating factors -  Quality -  Radiation -  Timing-  Severity (0-10 scale):  Minimal acceptable level (0-10 scale):     CPOT:    https://www.[facility].org/getattachment/[email or web address]/Critical-Care-Pain-Observation-Tool-(CPOT)      PAIN AD Score: 0    http://geriatrictoolkit.[facility]/cog/painad.pdf (press ctrl +  left click to view)    Dyspnea:                           [ ]Mild [ ]Moderate [ ]Severe  Anxiety:                             [ ]Mild [ ]Moderate [ ]Severe  Fatigue:                             [ ]Mild [ ]Moderate [ ]Severe  Nausea:                             [ ]Mild [ ]Moderate [ ]Severe  Loss of appetite:              [ ]Mild [ ]Moderate [ ]Severe  Constipation:                    [ ]Mild [ ]Moderate [ ]Severe    Other Symptoms:  [ ]All other review of systems negative     Palliative Performance Status Version 2:    20     %    http://npcrc.org/files/news/palliative_performance_scale_ppsv2.pdf  PHYSICAL EXAM:  Vital Signs Last 24 Hrs  T(C): 37.6 (15 Mar 2021 17:25), Max: 37.6 (15 Mar 2021 17:25)  T(F): 99.6 (15 Mar 2021 17:25), Max: 99.6 (15 Mar 2021 17:25)  HR: 108 (15 Mar 2021 18:15) (85 - 110)  BP: 153/80 (15 Mar 2021 18:15) (137/72 - 153/80)  BP(mean): --  RR: 20 (15 Mar 2021 17:25) (20 - 20)  SpO2: 99% (15 Mar 2021 17:25) (95% - 99%)    GENERAL:  [ ]Alert  [ ]Oriented x   [x ]Lethargic  [ ]Cachexia  [ ]Unarousable  [ ]Verbal  [ ]Non-Verbal  Behavioral:   [ ] Anxiety  [ ] Delirium [ ] Agitation [ ] Other  HEENT:  [ ]Normal   [ ]Dry mouth   [ ]ET Tube/Trach  [ ]Oral lesions  PULMONARY:   [ ]Clear [ ]Tachypnea  [ ]Audible excessive secretions   [ ]Rhonchi        [ ]Right [ ]Left [ ]Bilateral  [ ]Crackles        [ ]Right [ ]Left [ ]Bilateral  [ ]Wheezing     [ ]Right [ ]Left [ ]Bilateral  [x ]Diminished breath sounds [ ]right [ ]left [ x]bilateral  [x ] Gurgling breath sounds   CARDIOVASCULAR:    [ ]Regular [x ]Irregular [ x]Tachy  [ ]Ja [ ]Murmur [ ]Other  GASTROINTESTINAL:  [ x]Soft  [ ]Distended   [ ]+BS  [ ]Non tender [ ]Tender  [ ]PEG [ ]OGT/ NGT  Last BM:   GENITOURINARY:  [ ]Normal [x ] Incontinent   [ ]Oliguria/Anuria   [ ]Delgado  MUSCULOSKELETAL:   [ ]Normal   [x ]Weakness  [ ]Bed/Wheelchair bound [ ]Edema  NEUROLOGIC:   [ ]No focal deficits  [x ]Cognitive impairment  [x ]Dysphagia [ ]Dysarthria [x ]Right jovana-Paresis [ x]Other: Aphasia. Not f/u commands   SKIN:   [x ]Normal    [ ]Rash  [ ]Pressure ulcer(s)       Present on admission [ ]y [ ]n    CRITICAL CARE:  [ ] Shock Present  [ ]Septic [ ]Cardiogenic [ ]Neurologic [ ]Hypovolemic  [ ]  Vasopressors [ ]  Inotropes   [ ]Respiratory failure present [ ]Mechanical ventilation [ ]Non-invasive ventilatory support [ ]High flow    [ ]Acute  [ ]Chronic [ ]Hypoxic  [ ]Hypercarbic [ ]Other  [ ]Other organ failure     LABS: no new labs.                                RADIOLOGY & ADDITIONAL STUDIES:    c< from: CT Head No Cont (03.14.21 @ 10:05) >    EXAM:  CT BRAIN                            PROCEDURE DATE:  03/14/2021  IMPRESSION:  Evolving left middle cerebral artery territorial infarct increased in size.    No gross acute intracranial hemorrhage                ANNIE COLEY MD; Attending Radiologist  This document has been electronically signed. Mar 14 2021 10:10AM    < end of copied text >    EXAM:  CT PERFUSION W MAPS IC                            PROCEDURE DATE:  03/10/2021    CTA brain:  Lack of flow related enhancement of the left M1 segment, the left MCA bifurcation, and proximal left M2 branches. Decreased flow related enhancement of more distal left MCA branches. Findings are consistent with the presence of intraluminal thrombus or embolus.    No vascular aneurysm. No AVM.    CTA neck:  No flow-limiting stenosis or evidence for arterial dissection.    CT brain perfusion:  Cerebral blood flow less than 30% = 0 mL, therefore no core infarct is predicted.    Tmax greater than 6 seconds = 123 mL and involves much of the left MCA territory. This indicates territory at continued ischemic risk and the presence of neurologic symptoms.    Dr. Sorensen discussed these findings with neurology stroke PA Jennifer on 3/10/2021 1:23 PM with read back.                  PEDRO LUIS SORENSEN MD; Attending Radiologist  This document has been electronically signed. Mar 10 2021  1:43PM    < end of copied text >  PROTEIN CALORIE MALNUTRITION PRESENT: [ ]mild [ ]moderate [ ]severe [ ]underweight [ ]morbid obesity  https://www.andeal.org/vault/2440/web/files/ONC/Table_Clinical%20Characteristics%20to%20Document%20Malnutrition-White%20JV%20et%20al%692843.pdf    Height (cm): 182.9 (03-10-21 @ 12:35), 182.9 (12-28-20 @ 11:09), 182.9 (09-02-20 @ 22:15)  Weight (kg): 67.7 (03-10-21 @ 13:22), 77.1 (12-28-20 @ 11:09), 71.3 (09-02-20 @ 22:15)  BMI (kg/m2): 20.2 (03-10-21 @ 13:22), 23 (03-10-21 @ 12:35), 23 (12-28-20 @ 11:09)    [ ]PPSV2 < or = to 30% [ ]significant weight loss  [ ]poor nutritional intake  [ ]anasarca     Albumin, Serum: 3.4 g/dL (03-10-21 @ 12:53)   [ ]Artificial Nutrition      REFERRALS:   [ ]Chaplaincy  [ ]Hospice  [ ]Child Life  [ ]Social Work  [ ]Case management [ ]Holistic Therapy     Goals of Care Document:

## 2021-03-16 NOTE — PROGRESS NOTE ADULT - PROBLEM SELECTOR PLAN 6
-Hold home finasteride and tamsulosin for now given NPO status.  -Monitor UO for any signs of retention. Strict Is/Os  -Bladder scan monitor for retention -Hold home finasteride and tamsulosin for now given NPO status.  -Monitor UO for any signs of retention. Strict Is/Os

## 2021-03-16 NOTE — PROGRESS NOTE ADULT - ASSESSMENT
89 M w/ afib (Not on eliquis per the wife) CABG, PPM, HTN, BPH, and dementia aaox1 p/w left sided weakness and aphasia. CT on admission showed Left MCA stroke. Palliative care was consulted for GOC and PCU eval.

## 2021-03-16 NOTE — PROGRESS NOTE ADULT - SUBJECTIVE AND OBJECTIVE BOX
Patient is a 89y old  Male who presents with a chief complaint of Right sided weakness (16 Mar 2021 08:45)      SUBJECTIVE / OVERNIGHT EVENTS:    No acute overnight events.    ROS: (  ) Fever, (  )Chills,  (  )Nausea/Vomiting, (  ) Cough, (  )Shortness of breath, (  )Chest Pain    MEDICATIONS  (STANDING):  artificial  tears Solution 1 Drop(s) Both EYES three times a day  cadexomer iodine 0.9% Gel 1 Application(s) Topical two times a day  collagenase Ointment 1 Application(s) Topical daily  dextrose 5% + sodium chloride 0.45%. 1000 milliLiter(s) (50 mL/Hr) IV Continuous <Continuous>  enoxaparin Injectable 40 milliGRAM(s) SubCutaneous daily  glycopyrrolate Injectable 0.2 milliGRAM(s) IV Push every 8 hours  metoprolol tartrate Injectable 5 milliGRAM(s) IV Push every 6 hours    MEDICATIONS  (PRN):  acetaminophen   Tablet .. 650 milliGRAM(s) Oral every 6 hours PRN Temp greater or equal to 38C (100.4F), Mild Pain (1 - 3), Moderate Pain (4 - 6)  glycopyrrolate Injectable 0.2 milliGRAM(s) IV Push every 6 hours PRN Oropharyngeal secretions      T(C): 36.4 (03-16 @ 10:50), Max: 37.6 (03-15 @ 17:25)   HR: 108   BP: 107/76   RR: 20   SpO2: 98%    PHYSICAL EXAM:  GENERAL: NAD, well-developed  CHEST/LUNG: Clear to auscultation bilaterally; No wheeze  HEART: Regular rate and rhythm; No murmurs, rubs, or gallops  ABDOMEN: Soft, Nontender, Nondistended; Bowel sounds present  EXTREMITIES:  2+ Peripheral Pulses, No clubbing, cyanosis, or edema  PSYCH: AAOx3  NEUROLOGY: non-focal    LABS:              CAPILLARY BLOOD GLUCOSE          RADIOLOGY & ADDITIONAL TESTS:    Imaging Personally Reviewed:  Consultant(s) Notes Reviewed:    Care Discussed with Consultants/Other Providers:   Patient is a 89y old  Male who presents with a chief complaint of Right sided weakness (16 Mar 2021 08:45)      SUBJECTIVE / OVERNIGHT EVENTS:    No acute overnight events.    ROS: unable to assess due to cognitive impairment.    MEDICATIONS  (STANDING):  artificial  tears Solution 1 Drop(s) Both EYES three times a day  cadexomer iodine 0.9% Gel 1 Application(s) Topical two times a day  collagenase Ointment 1 Application(s) Topical daily  dextrose 5% + sodium chloride 0.45%. 1000 milliLiter(s) (50 mL/Hr) IV Continuous <Continuous>  enoxaparin Injectable 40 milliGRAM(s) SubCutaneous daily  glycopyrrolate Injectable 0.2 milliGRAM(s) IV Push every 8 hours  metoprolol tartrate Injectable 5 milliGRAM(s) IV Push every 6 hours    MEDICATIONS  (PRN):  acetaminophen   Tablet .. 650 milliGRAM(s) Oral every 6 hours PRN Temp greater or equal to 38C (100.4F), Mild Pain (1 - 3), Moderate Pain (4 - 6)  glycopyrrolate Injectable 0.2 milliGRAM(s) IV Push every 6 hours PRN Oropharyngeal secretions      T(C): 36.4 (03-16 @ 10:50), Max: 37.6 (03-15 @ 17:25)   HR: 108   BP: 107/76   RR: 20   SpO2: 98%    PHYSICAL EXAM:  GENERAL: NAD, well-developed  CHEST/LUNG: Clear to auscultation bilaterally; No wheeze  HEART: Regular rate and rhythm; No murmurs, rubs, or gallops  ABDOMEN: Soft, Nontender, Nondistended; Bowel sounds present  EXTREMITIES:  2+ Peripheral Pulses, No clubbing, cyanosis, or edema  PSYCH: AAOx0  NEUROLOGY: non-focal    LABS:              CAPILLARY BLOOD GLUCOSE          RADIOLOGY & ADDITIONAL TESTS:    Imaging Personally Reviewed:  Consultant(s) Notes Reviewed:    Care Discussed with Consultants/Other Providers:

## 2021-03-16 NOTE — PROGRESS NOTE ADULT - ASSESSMENT
· Assessment	  89 yo M pt w/ b/l heel wounds  - pt seen and evaluated  - R heel fibrous wound measuring 2x3 cm down to bone w/ no drainage and no signs of infection; L heel fibrous wound mearuing 4x3 down to bone w/ no drainage and no signs of active infection  - xrays negative for osteo at this time  - pod plan local wound care for now  - will monitor

## 2021-03-17 NOTE — PROGRESS NOTE ADULT - ASSESSMENT
89 yo M with Afib no on AC 2/2 fall, s/p PPM, CAD/CABG, dementia preMRS=4/5 p/w R HP and speech changes.  CTH/A/P with L M1 occlusion core 0 and penumbra of 123cc was out of tpa window. not a MT candidate given preMRS. CTH obtained this AM shows evolving L MCA Infarct but some sparing of the cortex noted. o/e with some improvement now still with gaze pref but can cross midline R still plegic and patient still aphasic. would still require peg if family decided against comfort   - family decided on palliative. DNR/I, hospice eval and referal made, planning in progress   - hold AC, okay for asa  - if in agreement with family wishes would give ASA/statin and hypertonics to avoid shift.   - Hemoglobin A1c and lipid panel  - telemetry  - PT/OT/SS/SLP, OOBC  - check FS, glucose control <180  - GI/DVT ppx  - Counseling on diet, exercise, and medication adherence was done  - Counseling on smoking cessation and alcohol consumption offered when appropriate.  - Pain assessed and judicious use of narcotics when appropriate was discussed.    - Stroke education given when appropriate.  - Importance of fall prevention discussed.   - Differential diagnosis and plan of care discussed with patient and/or family and primary team  - Thank you for allowing me to participate in the care of this patient. Call with questions.   - DNR/I no agressive measures, hospice referral. will f/u as needed or as questions arise     Eduardo Ugarte MD  Vascular Neurology  Office: [phone]

## 2021-03-17 NOTE — PROGRESS NOTE ADULT - PROBLEM SELECTOR PLAN 6
-Hold home finasteride and tamsulosin for now given NPO status.  -Monitor UO for any signs of retention. Strict Is/Os

## 2021-03-17 NOTE — PROGRESS NOTE ADULT - PROBLEM SELECTOR PLAN 1
-Presents with right-sided weakness and aphasia. CT head shows left MCA thrombus or embolus. -S/p stroke code. Limited options.   -Palliative and neuro discussed with patient's wife. -Will take a conservative approach. -Patient DNR/DNI. Hospice referral sent however family want a few days before deciding  -Keep NPO. Failed SS dysphagia screen. Wife doesn't not want pleasure feeds or tube feeds (no NGT) at this time. -Continue gentle IVF's.  -Neuro recs appreciated.   -repeat CTH 3/14 shows evolving L MCA infarct increased in size -Presents with right-sided weakness and aphasia. CT head shows left MCA thrombus or embolus. -s/p stroke code. Limited options.   -Palliative and neuro discussed with patient's wife. -Will take a conservative approach. -Patient DNR/DNI. Hospice referral sent however family want a few days before deciding  -Keep NPO. Failed SS dysphagia screen. Wife doesn't not want pleasure feeds or tube feeds (no NGT) at this time. -Continue gentle IVF's.  -Neuro recs appreciated.   -repeat CTH 3/14 shows evolving L MCA infarct increased in size

## 2021-03-17 NOTE — PROGRESS NOTE ADULT - SUBJECTIVE AND OBJECTIVE BOX
HPI:  89 M w/ afib (Not on eliquis per the wife) CABG, PPM, HTN, BPH, and dementia aaox1. Per EMR, "wife states that her  aware of everyone in the family, he knows who the president..." and "...is able to go to the bathroom, w/ a walker and assistance from the nursing aide." However, earlier today, the patient's aid witnessed that the pt wasn't moving his R arm. His wife, called her son, who is a Neurologist and indicated to give him an aspirin. The patient was aphasic. CT on admission showed Left MCA stroke. Palliative care was consulted for GOC and PCU eval.     3/11 Patient was lethargic and still not able to f/u commands. Neuro deficit is persistent   3/15 Though more alert, still not able to f/u commands and with aphasia, dysphagia and oropharyngeal secretions.   3/16  Neurologic deficit is persistent. Secretions have improved.   3/17 Today with episodic mild labored breathing. However, not evident distress.         PERTINENT PM/SXH:   History of polymyalgia rheumatica    HTN (hypertension)    BPH (benign prostatic hyperplasia)    Atrial fibrillation      H/O cardiac pacemaker    S/P CABG x 5      FAMILY HISTORY:  No pertinent family history in first degree relatives    MEDICATIONS  (STANDING):  MEDICATIONS  (STANDING):  artificial  tears Solution 1 Drop(s) Both EYES three times a day  cadexomer iodine 0.9% Gel 1 Application(s) Topical two times a day  collagenase Ointment 1 Application(s) Topical daily  dextrose 5% + sodium chloride 0.45%. 1000 milliLiter(s) (50 mL/Hr) IV Continuous <Continuous>  enoxaparin Injectable 40 milliGRAM(s) SubCutaneous daily  glycopyrrolate Injectable 0.2 milliGRAM(s) IV Push every 8 hours  metoprolol tartrate Injectable 5 milliGRAM(s) IV Push every 6 hours    MEDICATIONS  (PRN):  acetaminophen   Tablet .. 650 milliGRAM(s) Oral every 6 hours PRN Temp greater or equal to 38C (100.4F), Mild Pain (1 - 3), Moderate Pain (4 - 6)  glycopyrrolate Injectable 0.2 milliGRAM(s) IV Push every 6 hours PRN Oropharyngeal secretions        ITEMS NOT CHECKED ARE NOT PRESENT    SOCIAL HISTORY:   Significant other/partner[ x]   Children[x ]  Church/Spirituality:  Substance hx:  [ ]   Tobacco hx:  [ ]   Alcohol hx: [ ]   Home Opioid hx:  [ ] I-Stop Reference No:  Living Situation: [ x]Home  [ ]Long term care  [ ]Rehab [ ]Other  Lives with wife and has 12 h HHA.    ADVANCE DIRECTIVES:    DNR  MOLST  [x ]  Living Will  [ ]   DECISION MAKER(s):  [ ] Health Care Proxy(s)  [ x] Surrogate(s)  [ ] Guardian           Name(s): Phone Number(s): Wife     BASELINE (I)ADL(s) (prior to admission):  [place]: [ ]Total  [ ] Moderate [x ]Dependent    Allergies    No Known Allergies    Intolerances    MEDICATIONS  (STANDING):    MEDICATIONS  (PRN):    PRESENT SYMPTOMS: [ x]Unable to obtain due to poor mentation   Source if other than patient:  [ ]Family   [ ]Team     Pain: [ ]yes [x ]no  QOL impact -   Location -                    Aggravating factors -  Quality -  Radiation -  Timing-  Severity (0-10 scale):  Minimal acceptable level (0-10 scale):     CPOT:    https://www.sccm.org/getattachment/xrh83w28-5u6y-4i9p-5u9c-7016x9561i2b/Critical-Care-Pain-Observation-Tool-(CPOT)      PAIN AD Score: 0    http://geriatrictoolkit.[place].[email or web address]/painad.pdf (press ctrl +  left click to view)    Dyspnea:                           [ ]Mild [ ]Moderate [ ]Severe  Anxiety:                             [ ]Mild [ ]Moderate [ ]Severe  Fatigue:                             [ ]Mild [ ]Moderate [ ]Severe  Nausea:                             [ ]Mild [ ]Moderate [ ]Severe  Loss of appetite:              [ ]Mild [ ]Moderate [ ]Severe  Constipation:                    [ ]Mild [ ]Moderate [ ]Severe    Other Symptoms:  [ ]All other review of systems negative     Palliative Performance Status Version 2:    20     %    http://npcrc.org/files/news/palliative_performance_scale_ppsv2.pdf  PHYSICAL EXAM:  Vital Signs Last 24 Hrs  T(C): 36.6 (17 Mar 2021 16:30), Max: 36.7 (17 Mar 2021 00:25)  T(F): 97.8 (17 Mar 2021 16:30), Max: 98.1 (17 Mar 2021 00:25)  HR: 90 (17 Mar 2021 16:30) (75 - 109)  BP: 146/70 (17 Mar 2021 16:30) (107/63 - 146/70)  BP(mean): 78 (17 Mar 2021 06:50) (78 - 91)  RR: 18 (17 Mar 2021 16:30) (18 - 20)  SpO2: 93% (17 Mar 2021 16:30) (93% - 93%)  GENERAL:  [ ]Alert  [ ]Oriented x   [x ]Lethargic  [ ]Cachexia  [ ]Unarousable  [ ]Verbal  [ ]Non-Verbal  Behavioral:   [ ] Anxiety  [ ] Delirium [ ] Agitation [ ] Other  HEENT:  [ ]Normal   [ ]Dry mouth   [ ]ET Tube/Trach  [ ]Oral lesions  PULMONARY:   [ ]Clear [ ]Tachypnea  [ ]Audible excessive secretions   [ ]Rhonchi        [ ]Right [ ]Left [ ]Bilateral  [ ]Crackles        [ ]Right [ ]Left [ ]Bilateral  [ ]Wheezing     [ ]Right [ ]Left [ ]Bilateral  [x ]Diminished breath sounds [ ]right [ ]left [ x]bilateral  [x ] Gurgling breath sounds   CARDIOVASCULAR:    [ ]Regular [x ]Irregular [ x]Tachy  [ ]Ja [ ]Murmur [ ]Other  GASTROINTESTINAL:  [ x]Soft  [ ]Distended   [ ]+BS  [ ]Non tender [ ]Tender  [ ]PEG [ ]OGT/ NGT  Last BM:   GENITOURINARY:  [ ]Normal [x ] Incontinent   [ ]Oliguria/Anuria   [ ]Delgado  MUSCULOSKELETAL:   [ ]Normal   [x ]Weakness  [ ]Bed/Wheelchair bound [ ]Edema  NEUROLOGIC:   [ ]No focal deficits  [x ]Cognitive impairment  [x ]Dysphagia [ ]Dysarthria [x ]Right jovana-Paresis [ x]Other: Aphasia. Not f/u commands   SKIN:   [x ]Normal    [ ]Rash  [ ]Pressure ulcer(s)       Present on admission [ ]y [ ]n    CRITICAL CARE:  [ ] Shock Present  [ ]Septic [ ]Cardiogenic [ ]Neurologic [ ]Hypovolemic  [ ]  Vasopressors [ ]  Inotropes   [ ]Respiratory failure present [ ]Mechanical ventilation [ ]Non-invasive ventilatory support [ ]High flow    [ ]Acute  [ ]Chronic [ ]Hypoxic  [ ]Hypercarbic [ ]Other  [ ]Other organ failure     LABS: no new labs.                                RADIOLOGY & ADDITIONAL STUDIES:    c< from: CT Head No Cont (03.14.21 @ 10:05) >    EXAM:  CT BRAIN                            PROCEDURE DATE:  03/14/2021  IMPRESSION:  Evolving left middle cerebral artery territorial infarct increased in size.    No gross acute intracranial hemorrhage                ANNIE COLEY MD; Attending Radiologist  This document has been electronically signed. Mar 14 2021 10:10AM    < end of copied text >    EXAM:  CT PERFUSION W MAPS IC                            PROCEDURE DATE:  03/10/2021    CTA brain:  Lack of flow related enhancement of the left M1 segment, the left MCA bifurcation, and proximal left M2 branches. Decreased flow related enhancement of more distal left MCA branches. Findings are consistent with the presence of intraluminal thrombus or embolus.    No vascular aneurysm. No AVM.    CTA neck:  No flow-limiting stenosis or evidence for arterial dissection.    CT brain perfusion:  Cerebral blood flow less than 30% = 0 mL, therefore no core infarct is predicted.    Tmax greater than 6 seconds = 123 mL and involves much of the left MCA territory. This indicates territory at continued ischemic risk and the presence of neurologic symptoms.    Dr. Sorensen discussed these findings with neurology stroke PA Jennifer on 3/10/2021 1:23 PM with read back.                  PEDRO LUIS SORENSEN MD; Attending Radiologist  This document has been electronically signed. Mar 10 2021  1:43PM    < end of copied text >  PROTEIN CALORIE MALNUTRITION PRESENT: [ ]mild [ ]moderate [ ]severe [ ]underweight [ ]morbid obesity  https://www.andeal.org/vault/2440/web/files/ONC/Table_Clinical%20Characteristics%20to%20Document%20Malnutrition-White%20JV%20et%20al%095222.pdf    Height (cm): 182.9 (03-10-21 @ 12:35), 182.9 (12-28-20 @ 11:09), 182.9 (09-02-20 @ 22:15)  Weight (kg): 67.7 (03-10-21 @ 13:22), 77.1 (12-28-20 @ 11:09), 71.3 (09-02-20 @ 22:15)  BMI (kg/m2): 20.2 (03-10-21 @ 13:22), 23 (03-10-21 @ 12:35), 23 (12-28-20 @ 11:09)    [ ]PPSV2 < or = to 30% [ ]significant weight loss  [ ]poor nutritional intake  [ ]anasarca     Albumin, Serum: 3.4 g/dL (03-10-21 @ 12:53)   [ ]Artificial Nutrition      REFERRALS:   [ ]Chaplaincy  [ ]Hospice  [ ]Child Life  [ ]Social Work  [ ]Case management [ ]Holistic Therapy     Goals of Care Document:

## 2021-03-17 NOTE — PROGRESS NOTE ADULT - SUBJECTIVE AND OBJECTIVE BOX
Neurology Progress Note    S: Patient seen and examined. hospice palnning in progress. more alert tracking but still with dense HP    Medication:  MEDICATIONS  (STANDING):  artificial  tears Solution 1 Drop(s) Both EYES three times a day  cadexomer iodine 0.9% Gel 1 Application(s) Topical two times a day  collagenase Ointment 1 Application(s) Topical daily  dextrose 5% + sodium chloride 0.45%. 1000 milliLiter(s) (50 mL/Hr) IV Continuous <Continuous>  enoxaparin Injectable 40 milliGRAM(s) SubCutaneous daily  glycopyrrolate Injectable 0.2 milliGRAM(s) IV Push every 8 hours  metoprolol tartrate Injectable 5 milliGRAM(s) IV Push every 6 hours    MEDICATIONS  (PRN):  acetaminophen   Tablet .. 650 milliGRAM(s) Oral every 6 hours PRN Temp greater or equal to 38C (100.4F), Mild Pain (1 - 3), Moderate Pain (4 - 6)  glycopyrrolate Injectable 0.2 milliGRAM(s) IV Push every 6 hours PRN Oropharyngeal secretions    Vitals:  Vital Signs Last 24 Hrs  T(C): 36.7 (17 Mar 2021 09:32), Max: 37.7 (16 Mar 2021 16:10)  T(F): 98.1 (17 Mar 2021 09:32), Max: 99.9 (16 Mar 2021 16:10)  HR: 88 (17 Mar 2021 09:32) (75 - 109)  BP: 132/82 (17 Mar 2021 09:32) (106/75 - 140/84)  BP(mean): 78 (17 Mar 2021 06:50) (78 - 91)  RR: 18 (17 Mar 2021 09:32) (18 - 20)  SpO2: 93% (17 Mar 2021 09:32) (93% - 98%)      General Exam:   General Appearance: Appropriately dressed and in no acute distress       Head: Normocephalic, atraumatic and no dysmorphic features  Ear, Nose, and Throat: Moist mucous membranes   CVS: S1S2+  Resp: No SOB, no wheeze or rhonchi  Abd: soft NTND  Extremities: No edema, no cyanosis  Skin: No bruises, no rashes     Neurological Exam:  MS: eyes open, alert, no verbal output, withdraws and grimaces to noxious stimuli, attempts to follwo and speak but no verbal.    CN: noted mild left eye exotropia, able to cross midline to right today, pupils 2mm reactive to light, R HHA, R moderate facial droop  Motor: left arm and leg move spontaneously 3/5; right arm and leg minimal movement 0/5 essentially plegic  Cerebellar: unable   Gait: bedbound    I personally reviewed the below data/images/labs:   NO NEW LABS        Urinalysis Basic - ( 11 Mar 2021 04:49 )    Color: Yellow / Appearance: Slightly Turbid / S.032 / pH: x  Gluc: x / Ketone: Negative  / Bili: Negative / Urobili: Negative   Blood: x / Protein: 30 mg/dL / Nitrite: Negative   Leuk Esterase: Large / RBC: 61 /hpf /  /HPF   Sq Epi: x / Non Sq Epi: 0 /hpf / Bacteria: Many    CTA brain:  Lack of flow related enhancement of the left M1 segment, the left MCA bifurcation, and proximal left M2 branches. Decreased flow related enhancement of more distal left MCA branches. Findings are consistent with the presence of intraluminal thrombus or embolus.    No vascular aneurysm. No AVM.    CTA neck:  No flow-limiting stenosis or evidence for arterial dissection.    CT brain perfusion:  Cerebral blood flow less than 30% = 0 mL, therefore no core infarct is predicted.    Tmax greater than 6 seconds = 123 mL and involves much of the left MCA territory. This indicates territory at continued ischemic risk and the presence of neurologic symptoms.    < end of copied text >  < from: CT Angio Neck w/ IV Cont (03.10.21 @ 13:21) >  CTA brain:  Lack of flow related enhancement of the left M1 segment, the left MCA bifurcation, and proximal left M2 branches. Decreased flow related enhancement of more distal left MCA branches. Findings are consistent with the presence of intraluminal thrombus or embolus.    No vascular aneurysm. No AVM.    CTA neck:  No flow-limiting stenosis or evidence for arterial dissection.    CT brain perfusion:  Cerebral blood flow less than 30% = 0 mL, therefore no core infarct is predicted.    Tmax greater than 6 seconds = 123 mL and involves much of the left MCA territory. This indicates territory at continued ischemic risk and the presence of neurologic symptoms.      < from: CT Head No Cont (21 @ 10:05) >    EXAM:  CT BRAIN                            PROCEDURE DATE:  2021            INTERPRETATION:  CLINICAL STATEMENT: Left MCA infarct    TECHNIQUE: CT of the head was performed without IV contrast.  RAPID artificial intelligence was utilized for the preliminary evaluation of intracranial hemorrhage.    COMPARISON: CT head 3/10/2021    FINDINGS:  There is moderate diffuse parenchymal volume loss. There are areas of low attenuation in the periventricular white matter likely related to mild chronic microvascular ischemic changes.    Evolving left middle cerebral artery territorial infarct noted increased in size, largest area now measuring approximately 6.1 x 4.0 cm.    There is no acute intracranial hemorrhage, or midline shift. There is nohydrocephalus.    The cranium is intact.    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    No gross acute intracranial hemorrhage    ANNIE COLEY MD; Attending Radiologist  This document has been electronically signed. Mar 14 2021 10:10AM    < end of copied text >

## 2021-03-17 NOTE — PROGRESS NOTE ADULT - SUBJECTIVE AND OBJECTIVE BOX
Patient is a 89y old  Male who presents with a chief complaint of Right sided weakness (17 Mar 2021 09:29)      SUBJECTIVE / OVERNIGHT EVENTS:    No acute overnight events.    ROS: (  ) Fever, (  )Chills,  (  )Nausea/Vomiting, (  ) Cough, (  )Shortness of breath, (  )Chest Pain    MEDICATIONS  (STANDING):  artificial  tears Solution 1 Drop(s) Both EYES three times a day  cadexomer iodine 0.9% Gel 1 Application(s) Topical two times a day  collagenase Ointment 1 Application(s) Topical daily  dextrose 5% + sodium chloride 0.45%. 1000 milliLiter(s) (50 mL/Hr) IV Continuous <Continuous>  enoxaparin Injectable 40 milliGRAM(s) SubCutaneous daily  glycopyrrolate Injectable 0.2 milliGRAM(s) IV Push every 8 hours  metoprolol tartrate Injectable 5 milliGRAM(s) IV Push every 6 hours    MEDICATIONS  (PRN):  acetaminophen   Tablet .. 650 milliGRAM(s) Oral every 6 hours PRN Temp greater or equal to 38C (100.4F), Mild Pain (1 - 3), Moderate Pain (4 - 6)  glycopyrrolate Injectable 0.2 milliGRAM(s) IV Push every 6 hours PRN Oropharyngeal secretions      T(C): 36.7 (03-17 @ 09:32), Max: 37.7 (03-16 @ 16:10)   HR: 88   BP: 132/82   RR: 18   SpO2: 93%    PHYSICAL EXAM:  GENERAL: NAD, well-developed  CHEST/LUNG: Clear to auscultation bilaterally; No wheeze  HEART: Regular rate and rhythm; No murmurs, rubs, or gallops  ABDOMEN: Soft, Nontender, Nondistended; Bowel sounds present  EXTREMITIES:  2+ Peripheral Pulses, No clubbing, cyanosis, or edema  PSYCH: AAOx3  NEUROLOGY: non-focal    LABS:              CAPILLARY BLOOD GLUCOSE          RADIOLOGY & ADDITIONAL TESTS:    Imaging Personally Reviewed:  Consultant(s) Notes Reviewed:    Care Discussed with Consultants/Other Providers:   Patient is a 89y old  Male who presents with a chief complaint of Right sided weakness (17 Mar 2021 09:29)      SUBJECTIVE / OVERNIGHT EVENTS:    No acute overnight events.    ROS: unable to assess due to cognitive impairment    MEDICATIONS  (STANDING):  artificial  tears Solution 1 Drop(s) Both EYES three times a day  cadexomer iodine 0.9% Gel 1 Application(s) Topical two times a day  collagenase Ointment 1 Application(s) Topical daily  dextrose 5% + sodium chloride 0.45%. 1000 milliLiter(s) (50 mL/Hr) IV Continuous <Continuous>  enoxaparin Injectable 40 milliGRAM(s) SubCutaneous daily  glycopyrrolate Injectable 0.2 milliGRAM(s) IV Push every 8 hours  metoprolol tartrate Injectable 5 milliGRAM(s) IV Push every 6 hours    MEDICATIONS  (PRN):  acetaminophen   Tablet .. 650 milliGRAM(s) Oral every 6 hours PRN Temp greater or equal to 38C (100.4F), Mild Pain (1 - 3), Moderate Pain (4 - 6)  glycopyrrolate Injectable 0.2 milliGRAM(s) IV Push every 6 hours PRN Oropharyngeal secretions      T(C): 36.7 (03-17 @ 09:32), Max: 37.7 (03-16 @ 16:10)   HR: 88   BP: 132/82   RR: 18   SpO2: 93%    PHYSICAL EXAM:  GENERAL: NAD, well-developed  CHEST/LUNG: Clear to auscultation bilaterally; No wheeze  HEART: Regular rate and rhythm; No murmurs, rubs, or gallops  ABDOMEN: Soft, Nontender, Nondistended; Bowel sounds present  EXTREMITIES:  2+ Peripheral Pulses, No clubbing, cyanosis, or edema  PSYCH: AAOx0  NEUROLOGY: non-focal    LABS:              CAPILLARY BLOOD GLUCOSE          RADIOLOGY & ADDITIONAL TESTS:    Imaging Personally Reviewed:  Consultant(s) Notes Reviewed:    Care Discussed with Consultants/Other Providers:

## 2021-03-17 NOTE — PROGRESS NOTE ADULT - PROBLEM SELECTOR PLAN 2
Leukocytosis  UA + leuk est large, bacteria  Continue ceftriaxone IV x3days  F/U urine culture- E coli, sens to follow completed ceftriaxone IV x3days

## 2021-03-17 NOTE — PROGRESS NOTE ADULT - PROBLEM SELECTOR PLAN 7
Will transfer to PCU when a bed becomes available.   Will also add Morphine 1 mg IV q 2 PRN for respiratory distress.           Raymundo Cohn MD   Geriatrics and Palliative Care ([facility]) Consult Service    of Geriatric and Palliative Medicine  [facility]      Please page the following number for clinical matters between the hours of 9 am and 5 pm from Monday through Friday : [phone]    After 5pm and on weekends, please see the contact information below:    In the event of newly developing, evolving, or worsening symptoms, please contact the Palliative Medicine team via pager (if the patient is at [facility] #[phone] or if the patient is at [facility] #[phone]) The Geriatric and Palliative Medicine service has coverage 24 hours a day/ 7 days a week to provide medical recommendations regarding symptom management needs via telephone

## 2021-03-18 NOTE — H&P ADULT - PROBLEM SELECTOR PROBLEM 3
Dementia without behavioral disturbance, unspecified dementia type Cerebrovascular accident (CVA), unspecified mechanism

## 2021-03-18 NOTE — H&P ADULT - NSICDXPASTMEDICALHX_GEN_ALL_CORE_FT
PAST MEDICAL HISTORY:  Atrial fibrillation     BPH (benign prostatic hyperplasia)     Dementia     History of polymyalgia rheumatica this is why patient is on chronic prednisone    HTN (hypertension)     Hyperlipidemia

## 2021-03-18 NOTE — H&P ADULT - ASSESSMENT
89 year old male with PMH of Afib (Not on eliquis per the wife), CABG, PPM, HTN, BPH, PMR, and dementia aaox1 per chart documentation; brought to [facility] with new right-sided weakness; found to have a left MCA stroke on CT head, patient is aphasic. Mild leukocytosis on labs. Palliative discussed with wife and patient is now DNR/DNI. Admitted for possible transfer to home hospice.  89 year old male with PMH of Afib (Not on eliquis per the wife), CABG, PPM, HTN, BPH, PMR, and dementia aaox1 per chart documentation; brought to [facility] with new right-sided weakness; found to have a left MCA stroke on CT head, patient is aphasic. Mild leukocytosis on labs. Palliative discussed with wife and patient is now DNR/DNI.   Patient admitted to pcu as inpatient hospice for end of life care.

## 2021-03-18 NOTE — PROGRESS NOTE ADULT - PROVIDER SPECIALTY LIST ADULT
Hospitalist
Hospitalist
Neurology
Podiatry
Podiatry
Palliative Care
Palliative Care
Hospitalist
Palliative Care
Palliative Care
Hospitalist

## 2021-03-18 NOTE — PROGRESS NOTE ADULT - PROBLEM SELECTOR PROBLEM 1
Cerebrovascular accident (CVA), unspecified mechanism
Left middle cerebral artery stroke
Cerebrovascular accident (CVA), unspecified mechanism

## 2021-03-18 NOTE — HOSPICE CARE NOTE - CONVESATION DETAILS
Friday 3/12: [facility]: Patient was approved for hospice service and telephone call made to spouse Mrs Arce and hospice plan of care discussed. Mrs Arce stated that she will like to wait 3 days to see how patient does before making a decision for hospice service. Ava Chaney RN. 
Patient is approved for inpatient hospice on PCU pending bed availability. Please call [facility] prior to transfer so that forms can be sent to notify admissions to flip the chart for billing.
Notified late afternoon by PCU Nurse-Mgr, Mel[place], that a bed has become available in PCU.  [facility] Ref'al Dept faxed signed consent forms   over to PCU ([phone]).  Faxed Change of Status/Billing Form to [facility] Adm Dept ([phone]).  Called pt's wife, who informs   she is w pt in hosp.  Says 8M  has informed her of pending transfer of her  to PCU.
On 3/12 pt was approved for home hospice care.  Per d/w Dr Raymundo Cohn (PCT), pt NPO, wife will not want PEG;  is requiring IVP   Robinul to manage secretions.  Pt alert but lethargic, non-verbal.  Today [facility] MD gave approval pt for IP care.  Wife wishes pt to be   transf'd to PCU when bed is available.     Faxed [facility] Consents to CM, who brought in to wife.  Wife aware to call me back to review forms, but prefers to wait until tomorrow   morning to do this.  Dr Cohn aware.  Will f/u w wife in A.M.

## 2021-03-18 NOTE — H&P ADULT - PROBLEM SELECTOR PLAN 1
-Presents with right-sided weakness and aphasia. CT head shows left MCA thrombus or embolus. -S/p stroke code. Limited options.   -Palliative and neuro discussed with patient's wife. -Will take a conservative approach. -Patient now DNR/DNI. Will start looking in to home hospice.  -Keep NPO for now pending dysphagia screen given risk of aspiration. Wife doesn't not want pleasure feeds or tube feeds (no NGT) at this time. -Will give gentle IVF's.  -Will check UA and urine culture given leukocytosis on admission in case some degree of his mental status changes may be reversible. Consider trial of abx if UA positive.   -IVF's will also help with hypercalcemia noted on labs. -Hyperkalemia was due to hemolysis of lab sample.  -Neuro recs appreciated.   -PT/OT/swallow eval. Neuro checks. Turn and position, robinul 0.4mg IVP every 6 hours PRN, scopolamine patch if refractory.

## 2021-03-18 NOTE — PROGRESS NOTE ADULT - SUBJECTIVE AND OBJECTIVE BOX
Patient is a 89y old  Male who presents with a chief complaint of Right sided weakness (18 Mar 2021 10:35)      SUBJECTIVE / OVERNIGHT EVENTS:    No acute overnight events.    ROS: (  ) Fever, (  )Chills,  (  )Nausea/Vomiting, (  ) Cough, (  )Shortness of breath, (  )Chest Pain    MEDICATIONS  (STANDING):  artificial  tears Solution 1 Drop(s) Both EYES three times a day  cadexomer iodine 0.9% Gel 1 Application(s) Topical two times a day  chlorhexidine 0.12% Liquid 15 milliLiter(s) Oral Mucosa two times a day  collagenase Ointment 1 Application(s) Topical daily  dextrose 5% + sodium chloride 0.45%. 1000 milliLiter(s) (50 mL/Hr) IV Continuous <Continuous>  enoxaparin Injectable 40 milliGRAM(s) SubCutaneous daily  glycopyrrolate Injectable 0.2 milliGRAM(s) IV Push every 8 hours  metoprolol tartrate Injectable 5 milliGRAM(s) IV Push every 6 hours    MEDICATIONS  (PRN):  acetaminophen   Tablet .. 650 milliGRAM(s) Oral every 6 hours PRN Temp greater or equal to 38C (100.4F), Mild Pain (1 - 3), Moderate Pain (4 - 6)  glycopyrrolate Injectable 0.2 milliGRAM(s) IV Push every 6 hours PRN Oropharyngeal secretions  morphine  - Injectable 1 milliGRAM(s) IV Push every 2 hours PRN Labored breathing or RR > 30      T(C): 36.7 (03-18 @ 08:08), Max: 36.9 (03-17 @ 23:30)   HR: 97   BP: 145/82   RR: 22   SpO2: 93%    PHYSICAL EXAM:  GENERAL: NAD, well-developed  CHEST/LUNG: Clear to auscultation bilaterally; No wheeze  HEART: Regular rate and rhythm; No murmurs, rubs, or gallops  ABDOMEN: Soft, Nontender, Nondistended; Bowel sounds present  EXTREMITIES:  2+ Peripheral Pulses, No clubbing, cyanosis, or edema  PSYCH: AAOx3  NEUROLOGY: non-focal    LABS:              CAPILLARY BLOOD GLUCOSE          RADIOLOGY & ADDITIONAL TESTS:    Imaging Personally Reviewed:  Consultant(s) Notes Reviewed:    Care Discussed with Consultants/Other Providers:   Patient is a 89y old  Male who presents with a chief complaint of Right sided weakness (18 Mar 2021 10:35)      SUBJECTIVE / OVERNIGHT EVENTS:    No acute overnight events.        MEDICATIONS  (STANDING):  artificial  tears Solution 1 Drop(s) Both EYES three times a day  cadexomer iodine 0.9% Gel 1 Application(s) Topical two times a day  chlorhexidine 0.12% Liquid 15 milliLiter(s) Oral Mucosa two times a day  collagenase Ointment 1 Application(s) Topical daily  dextrose 5% + sodium chloride 0.45%. 1000 milliLiter(s) (50 mL/Hr) IV Continuous <Continuous>  enoxaparin Injectable 40 milliGRAM(s) SubCutaneous daily  glycopyrrolate Injectable 0.2 milliGRAM(s) IV Push every 8 hours  metoprolol tartrate Injectable 5 milliGRAM(s) IV Push every 6 hours    MEDICATIONS  (PRN):  acetaminophen   Tablet .. 650 milliGRAM(s) Oral every 6 hours PRN Temp greater or equal to 38C (100.4F), Mild Pain (1 - 3), Moderate Pain (4 - 6)  glycopyrrolate Injectable 0.2 milliGRAM(s) IV Push every 6 hours PRN Oropharyngeal secretions  morphine  - Injectable 1 milliGRAM(s) IV Push every 2 hours PRN Labored breathing or RR > 30      T(C): 36.7 (03-18 @ 08:08), Max: 36.9 (03-17 @ 23:30)   HR: 97   BP: 145/82   RR: 22   SpO2: 93%    PHYSICAL EXAM:  GENERAL: NAD, well-developed  CHEST/LUNG: Clear to auscultation bilaterally; No wheeze  HEART: Regular rate and rhythm; No murmurs, rubs, or gallops  ABDOMEN: Soft, Nontender, Nondistended; Bowel sounds present  EXTREMITIES:  2+ Peripheral Pulses, No clubbing, cyanosis, or edema  PSYCH: AAOx0      LABS:              CAPILLARY BLOOD GLUCOSE          RADIOLOGY & ADDITIONAL TESTS:    Imaging Personally Reviewed:  Consultant(s) Notes Reviewed:    Care Discussed with Consultants/Other Providers:

## 2021-03-18 NOTE — H&P ADULT - NSHPPHYSICALEXAM_GEN_ALL_CORE
PHYSICAL EXAM:  Vital Signs Last 24 Hrs  T(C): 36.3 (03-10-21 @ 12:35)  T(F): 97.3 (03-10-21 @ 12:35), Max: 97.3 (03-10-21 @ 12:35)  HR: 104 (03-10-21 @ 12:35) (104 - 104)  BP: 151/89 (03-10-21 @ 12:35)  BP(mean): --  RR: 18 (03-10-21 @ 12:35) (18 - 18)  SpO2: 98% (03-10-21 @ 12:35) (98% - 98%)  Wt(kg): --    Constitutional: appears somewhat uncomfortable but not in pain.   ENT:  dry mm  Neck: Soft and supple,  Respiratory: Breath sounds are clear bilaterally - distant, No wheezing, rales or rhonchi  Cardiovascular: S1 and S2, irregular rate and rhythm, systolic murmur heard  Gastrointestinal: Bowel Sounds present, soft, nontender, nondistended, no guarding, no rebound  Extremities: No cyanosis or clubbing or edema; warm to touch  Neurological: Awake but not communicating. Right sided weakness.   Skin: B/l LE distal skin wounds bandaged.   Psych: No depression or anhedonia PHYSICAL EXAM:  Vital Signs Last 24 Hrs  T(C): 37.1 (18 Mar 2021 07:24), Max: 37.1 (17 Mar 2021 19:07)  T(F): 98.7 (18 Mar 2021 07:24), Max: 98.7 (17 Mar 2021 19:07)  HR: 98 (18 Mar 2021 07:24) (79 - 114)  BP: 125/68 (18 Mar 2021 07:24) (119/67 - 138/80)  BP(mean): --  RR: 22 (18 Mar 2021 07:24) (18 - 22)  SpO2: 89% (18 Mar 2021 07:24) (89% - 99%)    Constitutional: Somnolent, noisy breath sounds  ENT:  dry mm  Neck: Soft and supple,  Respiratory: audible secretions, otherwise clear to a  Cardiovascular: S1 and S2, irregular rate and rhythm, systolic murmur heard  Gastrointestinal: Bowel Sounds present, soft, nontender, nondistended, no guarding, no rebound  Extremities: No cyanosis or clubbing or edema; warm to touch  Neurological: Minimally responsive  Skin: B/l LE distal skin wounds bandaged.   Psych: No depression or anhedonia

## 2021-03-18 NOTE — H&P ADULT - PROBLEM SELECTOR PLAN 6
-Unclear if patient was still on prednisone at home. Needs to be clarified. Will hold off on any steroids at this time regardless. Family is focused on symptom directed care at the end of life.  DNR.DNI

## 2021-03-18 NOTE — PROGRESS NOTE ADULT - REASON FOR ADMISSION
Right sided weakness

## 2021-03-18 NOTE — H&P ADULT - PROBLEM SELECTOR PROBLEM 5
Benign prostatic hyperplasia, unspecified whether lower urinary tract symptoms present Functional quadriplegia

## 2021-03-18 NOTE — PROGRESS NOTE ADULT - ASSESSMENT
89 yo M with Afib no on AC 2/2 fall, s/p PPM, CAD/CABG, dementia preMRS=4/5 p/w R HP and speech changes.  CTH/A/P with L M1 occlusion core 0 and penumbra of 123cc was out of tpa window. not a MT candidate given preMRS. CTH obtained this AM shows evolving L MCA Infarct but some sparing of the cortex noted. o/e with some improvement now still with gaze pref but can cross midline R still plegic and patient still aphasic. more lethargic today.  - family decided on palliative. DNR/I, hospice eval and referal made, planning in progress accepted to inpatient hospice unit, awaitin bed.   - hold AC, okay for asa  - Counseling on diet, exercise, and medication adherence was done  - Counseling on smoking cessation and alcohol consumption offered when appropriate.  - Pain assessed and judicious use of narcotics when appropriate was discussed.    - Stroke education given when appropriate.  - Importance of fall prevention discussed.   - Differential diagnosis and plan of care discussed with patient and/or family and primary team  - Thank you for allowing me to participate in the care of this patient. Call with questions.   - DNR/I no agressive measures, hospice referral. will f/u as needed or as questions arise     Eduardo Ugarte MD  Vascular Neurology  Office: [phone]

## 2021-03-18 NOTE — H&P ADULT - PROBLEM SELECTOR PLAN 3
-Will hold home donepezil and memantine given NPO status for now. Limited benefit at this point.   -Frequent reorientation as possible.   -Per wife, patient is usually not agitated at home.   -Aspiration and fall precautions. Large left mca stroke.  Actively dying.  Supportive care

## 2021-03-18 NOTE — PROGRESS NOTE ADULT - SUBJECTIVE AND OBJECTIVE BOX
Neurology Progress Note    S: Patient seen and examined. more lethargic, accepted to inpatient hospice, awaiting bed.     Medication:  MEDICATIONS  (STANDING):  artificial  tears Solution 1 Drop(s) Both EYES three times a day  cadexomer iodine 0.9% Gel 1 Application(s) Topical two times a day  collagenase Ointment 1 Application(s) Topical daily  dextrose 5% + sodium chloride 0.45%. 1000 milliLiter(s) (50 mL/Hr) IV Continuous <Continuous>  enoxaparin Injectable 40 milliGRAM(s) SubCutaneous daily  glycopyrrolate Injectable 0.2 milliGRAM(s) IV Push every 8 hours  metoprolol tartrate Injectable 5 milliGRAM(s) IV Push every 6 hours    MEDICATIONS  (PRN):  acetaminophen   Tablet .. 650 milliGRAM(s) Oral every 6 hours PRN Temp greater or equal to 38C (100.4F), Mild Pain (1 - 3), Moderate Pain (4 - 6)  glycopyrrolate Injectable 0.2 milliGRAM(s) IV Push every 6 hours PRN Oropharyngeal secretions  morphine  - Injectable 1 milliGRAM(s) IV Push every 2 hours PRN Labored breathing or RR > 30      Vitals:  Vital Signs Last 24 Hrs  T(C): 36.7 (18 Mar 2021 08:08), Max: 36.9 (17 Mar 2021 23:30)  T(F): 98.1 (18 Mar 2021 08:08), Max: 98.4 (17 Mar 2021 23:30)  HR: 97 (18 Mar 2021 08:08) (90 - 101)  BP: 145/82 (18 Mar 2021 08:08) (131/75 - 164/93)  BP(mean): 94 (18 Mar 2021 04:59) (94 - 94)  RR: 22 (18 Mar 2021 08:08) (18 - 25)  SpO2: 93% (18 Mar 2021 08:08) (93% - 93%)    General Exam:   General Appearance: Appropriately dressed and in no acute distress       Head: Normocephalic, atraumatic and no dysmorphic features  Ear, Nose, and Throat: Moist mucous membranes   CVS: S1S2+  Resp: No SOB, no wheeze or rhonchi  Abd: soft NTND  Extremities: No edema, no cyanosis  Skin: No bruises, no rashes     Neurological Exam:  MS: eyes open, alert, no verbal output, withdraws and grimaces to noxious stimuli, attempts to follwo and speak but no verbal.    CN: noted mild left eye exotropia, able to cross midline to right today, pupils 2mm reactive to light, R HHA, R moderate facial droop  Motor: left arm and leg move spontaneously 3/5; right arm and leg minimal movement 0/5 essentially plegic  Cerebellar: unable   Gait: bedbound    I personally reviewed the below data/images/labs:   NO NEW LABS        Urinalysis Basic - ( 11 Mar 2021 04:49 )    Color: Yellow / Appearance: Slightly Turbid / S.032 / pH: x  Gluc: x / Ketone: Negative  / Bili: Negative / Urobili: Negative   Blood: x / Protein: 30 mg/dL / Nitrite: Negative   Leuk Esterase: Large / RBC: 61 /hpf /  /HPF   Sq Epi: x / Non Sq Epi: 0 /hpf / Bacteria: Many    CTA brain:  Lack of flow related enhancement of the left M1 segment, the left MCA bifurcation, and proximal left M2 branches. Decreased flow related enhancement of more distal left MCA branches. Findings are consistent with the presence of intraluminal thrombus or embolus.    No vascular aneurysm. No AVM.    CTA neck:  No flow-limiting stenosis or evidence for arterial dissection.    CT brain perfusion:  Cerebral blood flow less than 30% = 0 mL, therefore no core infarct is predicted.    Tmax greater than 6 seconds = 123 mL and involves much of the left MCA territory. This indicates territory at continued ischemic risk and the presence of neurologic symptoms.    < end of copied text >  < from: CT Angio Neck w/ IV Cont (03.10.21 @ 13:21) >  CTA brain:  Lack of flow related enhancement of the left M1 segment, the left MCA bifurcation, and proximal left M2 branches. Decreased flow related enhancement of more distal left MCA branches. Findings are consistent with the presence of intraluminal thrombus or embolus.    No vascular aneurysm. No AVM.    CTA neck:  No flow-limiting stenosis or evidence for arterial dissection.    CT brain perfusion:  Cerebral blood flow less than 30% = 0 mL, therefore no core infarct is predicted.    Tmax greater than 6 seconds = 123 mL and involves much of the left MCA territory. This indicates territory at continued ischemic risk and the presence of neurologic symptoms.      < from: CT Head No Cont (03.14.21 @ 10:05) >    EXAM:  CT BRAIN                            PROCEDURE DATE:  2021            INTERPRETATION:  CLINICAL STATEMENT: Left MCA infarct    TECHNIQUE: CT of the head was performed without IV contrast.  RAPID artificial intelligence was utilized for the preliminary evaluation of intracranial hemorrhage.    COMPARISON: CT head 3/10/2021    FINDINGS:  There is moderate diffuse parenchymal volume loss. There are areas of low attenuation in the periventricular white matter likely related to mild chronic microvascular ischemic changes.    Evolving left middle cerebral artery territorial infarct noted increased in size, largest area now measuring approximately 6.1 x 4.0 cm.    There is no acute intracranial hemorrhage, or midline shift. There is nohydrocephalus.    The cranium is intact.    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    No gross acute intracranial hemorrhage    ANNIE COLEY MD; Attending Radiologist  This document has been electronically signed. Mar 14 2021 10:10AM    < end of copied text >

## 2021-03-18 NOTE — H&P ADULT - PROBLEM SELECTOR PLAN 5
-Hold home finasteride and tamsulosin for now given NPO status.  -Monitor UO for any signs of retention. Patient requires total support for all ADL's.  PPSV2  10  %.

## 2021-03-18 NOTE — H&P ADULT - PROBLEM SELECTOR PLAN 2
-Not on AC at home given previous history of falls and bleeds. -Per neuro recs, would hold off on full AC at this time given extent of infarct.   -At home on metoprolol 25mg ER daily. Since NPO, can instead give metoprolol 5mg IV Q6H standing for now.  -Trops elevated possibly due to demand ischemia Type 2 MI. Dilaudid 0.2mg IV q1PRN

## 2021-03-19 PROBLEM — E78.5 HYPERLIPIDEMIA, UNSPECIFIED: Chronic | Status: ACTIVE | Noted: 2021-01-01

## 2021-03-19 PROBLEM — F03.90 UNSPECIFIED DEMENTIA WITHOUT BEHAVIORAL DISTURBANCE: Chronic | Status: ACTIVE | Noted: 2021-01-01

## 2021-03-19 NOTE — PROGRESS NOTE ADULT - SUBJECTIVE AND OBJECTIVE BOX
GAP TEAM PALLIATIVE CARE UNIT PROGRESS NOTE:      [  ] Patient on hospice program.    INDICATION FOR PALLIATIVE CARE UNIT SERVICES:    INTERVAL HPI/OVERNIGHT EVENTS:    DNR on chart: Yes      Allergies    No Known Allergies    Intolerances    MEDICATIONS  (STANDING):  collagenase Ointment 1 Application(s) Topical <User Schedule>  glycopyrrolate Injectable 0.4 milliGRAM(s) IV Push every 6 hours  metoprolol tartrate Injectable 5 milliGRAM(s) IV Push every 6 hours  sodium chloride 0.9%. 1000 milliLiter(s) (10 mL/Hr) IV Continuous <Continuous>    MEDICATIONS  (PRN):  acetaminophen  Suppository .. 650 milliGRAM(s) Rectal every 6 hours PRN Temp greater or equal to 38C (100.4F)  bisacodyl Suppository 10 milliGRAM(s) Rectal daily PRN Constipation  morphine  - Injectable 1 milliGRAM(s) IV Push every 1 hour PRN pain  morphine  - Injectable 1 milliGRAM(s) IV Push every 1 hour PRN dyspnea    ITEMS UNCHECKED ARE NOT PRESENT    PRESENT SYMPTOMS: [ ]Unable to obtain due to poor mentation   Source if other than patient:  [ ]Family   [ ]Team     Pain: [ ] yes [ ] no  QOL impact -   Location -                    Aggravating factors -  Quality -  Radiation -  Timing-  Severity (0-10 scale):  Minimal acceptable level (0-10 scale):     Dyspnea:                           [ ]Mild [ ]Moderate [ ]Severe  Anxiety:                             [ ]Mild [ ]Moderate [ ]Severe  Fatigue:                             [ ]Mild [ ]Moderate [ ]Severe  Nausea:                             [ ]Mild [ ]Moderate [ ]Severe  Loss of appetite:              [ ]Mild [ ]Moderate [ ]Severe  Constipation:                    [ ]Mild [ ]Moderate [ ]Severe    PAINAD Score:    [email or web address].[email or web address].[email or web address]/cog/painad.pdf (Ctrl +  left click to view)  		  Other Symptoms:  [ ]All other review of systems negative     Palliative Performance Status Version 2:         %         http://npcrc.org/files/news/palliative_performance_scale_ppsv2.pdf  PHYSICAL EXAM:  Vital Signs Last 24 Hrs  T(C): 37.1 (19 Mar 2021 07:24), Max: 37.1 (18 Mar 2021 19:07)  T(F): 98.7 (19 Mar 2021 07:24), Max: 98.7 (18 Mar 2021 19:07)  HR: 98 (19 Mar 2021 07:24) (79 - 114)  BP: 125/68 (19 Mar 2021 07:24) (119/67 - 138/80)  BP(mean): --  RR: 22 (19 Mar 2021 07:24) (18 - 22)  SpO2: 89% (19 Mar 2021 07:24) (89% - 99%) I&O's Summary  GENERAL:  [ ]Alert  [ ]Oriented x   [ ]Lethargic  [ ]Cachexia  [ ]Unarousable  [ ]Verbal  [ ]Non-Verbal  Behavioral:   [ ] Anxiety  [ ] Delirium [ ] Agitation [ ] Other  HEENT:  [ ]Normal   [ ]Dry mouth   [ ]ET Tube/Trach  [ ]Oral lesions  PULMONARY:   [ ]Clear [ ]Tachypnea  [ ]Audible excessive secretions   [ ]Rhonchi        [ ]Right [ ]Left [ ]Bilateral  [ ]Crackles        [ ]Right [ ]Left [ ]Bilateral  [ ]Wheezing     [ ]Right [ ]Left [ ]Bilateral  [ ]Diminshed BS [ ]Right [ ]Left [ ]Bilateral    CARDIOVASCULAR:    [ ]Regular [ ]Irregular [ ]Tachy  [ ]Ja [ ]Murmur [ ]Other  GASTROINTESTINAL:  [ ]Soft  [ ]Distended   [ ]+BS  [ ]Non tender [ ]Tender  [ ]PEG [ ]OGT/ NGT   Last BM:     GENITOURINARY:  [ ]Normal [ ] Incontinent   [ ]Oliguria/Anuria   [ ]Delgado  MUSCULOSKELETAL:   [ ]Normal   [ ]Weakness  [ ]Bed/Wheelchair bound [ ]Edema  NEUROLOGIC:   [ ]No focal deficits  [ ] Cognitive impairment  [ ] Dysphagia [ ]Dysarthria [ ] Paresis [ ]Other   SKIN:   [ ]Normal  [ ]Rash     [ ]Pressure ulcer(s)  [ ]y [ ]n  Present on admission      CRITICAL CARE:  [ ] Shock Present  [ ]Septic [ ]Cardiogenic [ ]Neurologic [ ]Hypovolemic  [ ]  Vasopressors [ ]  Inotropes   [ ] Respiratory failure present [ ] Mechanical Ventilation [ ] Non-invasive ventilatory support [ ] High-Flow  [ ] Acute  [ ] Chronic [ ] Hypoxic  [ ] Hypercarbic [ ] Other  [ ] Other organ failure     LABS:            RADIOLOGY & ADDITIONAL STUDIES:    PROTEIN CALORIE MALNUTRITION: [ ] mild [ ] moderate [ ] severe  [ ] underweight [ ] morbid obesity    https://www.andeal.org/vault/5920/web/files/ONC/Table_Clinical%20Characteristics%20to%20Document%20Malnutrition-White%20JV%20et%20al%960488.pdf    Height (cm): 182 (03-18-21 @ 19:07), 182.9 (03-10-21 @ 12:35), 182.9 (12-28-20 @ 11:09)  Weight (kg): 67 (03-18-21 @ 19:07), 67.7 (03-10-21 @ 13:22), 77.1 (12-28-20 @ 11:09)  BMI (kg/m2): 20.2 (03-18-21 @ 19:07), 20.2 (03-10-21 @ 13:22), 23 (03-10-21 @ 12:35)    [ ] PPSV2 < or = 30% [ ] significant weight loss [ ] poor nutritional intake [ ] anasarca Albumin, Serum: 3.4 g/dL (03-10-21 @ 12:53)    Artificial Nutrition [ ]     REFERRALS:   [ ]Chaplaincy  [ ] Hospice  [ ]Child Life  [ ]Social Work  [ ]Case management [ ]Holistic Therapy [ ] Physical Therapy [ ] Dietary   Goals of Care Document:    GAP TEAM PALLIATIVE CARE UNIT PROGRESS NOTE:      [  ] Patient on hospice program.    INDICATION FOR PALLIATIVE CARE UNIT SERVICES: Symptom management in the setting of a presenting with a  Large L MCA CVA, aphasia, dysphagia, and hemiplegia    INTERVAL HPI/OVERNIGHT EVENTS: Chart reviewed. The patient is seen and examined at the bedside. The patient required PRN Morphine 1mg IV X3 for dyspnea within a 24hr(8am-8am)    DNR on chart: Yes      Allergies    No Known Allergies    Intolerances    MEDICATIONS  (STANDING):  collagenase Ointment 1 Application(s) Topical <User Schedule>  glycopyrrolate Injectable 0.4 milliGRAM(s) IV Push every 6 hours  metoprolol tartrate Injectable 5 milliGRAM(s) IV Push every 6 hours  sodium chloride 0.9%. 1000 milliLiter(s) (10 mL/Hr) IV Continuous <Continuous>    MEDICATIONS  (PRN):  acetaminophen  Suppository .. 650 milliGRAM(s) Rectal every 6 hours PRN Temp greater or equal to 38C (100.4F)  bisacodyl Suppository 10 milliGRAM(s) Rectal daily PRN Constipation  morphine  - Injectable 1 milliGRAM(s) IV Push every 1 hour PRN pain  morphine  - Injectable 1 milliGRAM(s) IV Push every 1 hour PRN dyspnea    ITEMS UNCHECKED ARE NOT PRESENT    PRESENT SYMPTOMS: [X ]Unable to obtain due to poor mentation   Source if other than patient:  [ ]Family   [ X]Team     Pain: [ ] yes [X ] no see PAIN AD score  QOL impact -   Location -                    Aggravating factors -  Quality -  Radiation -  Timing-  Severity (0-10 scale):  Minimal acceptable level (0-10 scale):     Dyspnea:                           [ ]Mild [ ]Moderate [ ]Severe  Anxiety:                             [ ]Mild [ ]Moderate [ ]Severe  Fatigue:                             [ ]Mild [ ]Moderate [ ]Severe  Nausea:                             [ ]Mild [ ]Moderate [ ]Severe  Loss of appetite:              [ ]Mild [ ]Moderate [ ]Severe  Constipation:                    [ ]Mild [ ]Moderate [ ]Severe    PAINAD Score:0    http://geriatrictoolkit.[place].edu/cog/painad.pdf (Ctrl +  left click to view)  		  Other Symptoms:  [ X]All other review of systems negative     Palliative Performance Status Version 2: 10 %         http://npcrc.org/files/news/palliative_performance_scale_ppsv2.pdf  PHYSICAL EXAM:  Vital Signs Last 24 Hrs  T(C): 37.1 (19 Mar 2021 07:24), Max: 37.1 (18 Mar 2021 19:07)  T(F): 98.7 (19 Mar 2021 07:24), Max: 98.7 (18 Mar 2021 19:07)  HR: 98 (19 Mar 2021 07:24) (79 - 114)  BP: 125/68 (19 Mar 2021 07:24) (119/67 - 138/80)  BP(mean): --  RR: 22 (19 Mar 2021 07:24) (18 - 22)  SpO2: 89% (19 Mar 2021 07:24) (89% - 99%) I&O's Summary  GENERAL:  [ ]Alert  [ ]Oriented x   [ X]Lethargic  [ ]Cachexia  [ ]Unarousable  [ ]Verbal  [ X]Non-Verbal  Behavioral:   [ ] Anxiety  [ ] Delirium [ ] Agitation [ ] Other  HEENT:  [ ]Normal   [ X]Dry mouth   [ ]ET Tube/Trach  [ ]Oral lesions  PULMONARY:   [ ]Clear [ ]Tachypnea  [ X]Audible excessive secretions   [ ]Rhonchi        [ ]Right [ ]Left [ ]Bilateral  [ ]Crackles        [ ]Right [ ]Left [ ]Bilateral  [ ]Wheezing     [ ]Right [ ]Left [ ]Bilateral  [ ]Diminshed BS [ ]Right [ ]Left [ ]Bilateral    CARDIOVASCULAR:    [X ]Regular [ ]Irregular [ ]Tachy  [ ]Ja [ ]Murmur [ ]Other  GASTROINTESTINAL:  [ X]Soft  [ ]Distended   [ X]+BS  [X ]Non tender [ ]Tender  [ ]PEG [ ]OGT/ NGT   Last BM:  3/12/21  GENITOURINARY:  [ ]Normal [ X] Incontinent   [ ]Oliguria/Anuria   [ ]Delgado  MUSCULOSKELETAL:   [ ]Normal   [ X]Weakness  [ X]Bed/Wheelchair bound [ ]Edema  NEUROLOGIC:   [ ]No focal deficits  [X ] Cognitive impairment  [X ] Dysphagia [ ]Dysarthria [ ] Paresis [ ]Other- aphasia and hemiplegia   SKIN:   [ ]Normal  [ ]Rash     [ ]Pressure ulcer(s)  [ ]y [ ]n  Present on admission      CRITICAL CARE:  [ ] Shock Present  [ ]Septic [ ]Cardiogenic [ ]Neurologic [ ]Hypovolemic  [ ]  Vasopressors [ ]  Inotropes   [ ] Respiratory failure present [ ] Mechanical Ventilation [ ] Non-invasive ventilatory support [ ] High-Flow  [ ] Acute  [ ] Chronic [ ] Hypoxic  [ ] Hypercarbic [ ] Other  [ ] Other organ failure     LABS:            RADIOLOGY & ADDITIONAL STUDIES:    PROTEIN CALORIE MALNUTRITION: [ ] mild [ ] moderate [ ] severe  [ ] underweight [ ] morbid obesity    https://www.andeal.org/vault/2440/web/files/ONC/Table_Clinical%20Characteristics%20to%20Document%20Malnutrition-White%20JV%20et%20al%730349.pdf    Height (cm): 182 (03-18-21 @ 19:07), 182.9 (03-10-21 @ 12:35), 182.9 (12-28-20 @ 11:09)  Weight (kg): 67 (03-18-21 @ 19:07), 67.7 (03-10-21 @ 13:22), 77.1 (12-28-20 @ 11:09)  BMI (kg/m2): 20.2 (03-18-21 @ 19:07), 20.2 (03-10-21 @ 13:22), 23 (03-10-21 @ 12:35)    [ ] PPSV2 < or = 30% [ ] significant weight loss [ ] poor nutritional intake [ ] anasarca Albumin, Serum: 3.4 g/dL (03-10-21 @ 12:53)    Artificial Nutrition [ ]     REFERRALS:   [ ]Chaplaincy  [ ] Hospice  [ ]Child Life  [ ]Social Work  [ ]Case management [ ]Holistic Therapy [ ] Physical Therapy [ ] Dietary   Goals of Care Document:    GAP TEAM PALLIATIVE CARE UNIT PROGRESS NOTE:      [  ] Patient on hospice program.    INDICATION FOR PALLIATIVE CARE UNIT SERVICES: Symptom management in the setting of a presenting with a  Large L MCA CVA, aphasia, dysphagia, and hemiplegia    INTERVAL HPI/OVERNIGHT EVENTS: Chart reviewed. The patient is seen and examined at the bedside. The patient required PRN Morphine 1mg IV X3 for dyspnea within a 24hr(8am-8am)    DNR on chart: Yes      Allergies    No Known Allergies    Intolerances    MEDICATIONS  (STANDING):  collagenase Ointment 1 Application(s) Topical <User Schedule>  glycopyrrolate Injectable 0.4 milliGRAM(s) IV Push every 6 hours  metoprolol tartrate Injectable 5 milliGRAM(s) IV Push every 6 hours  sodium chloride 0.9%. 1000 milliLiter(s) (10 mL/Hr) IV Continuous <Continuous>    MEDICATIONS  (PRN):  acetaminophen  Suppository .. 650 milliGRAM(s) Rectal every 6 hours PRN Temp greater or equal to 38C (100.4F)  bisacodyl Suppository 10 milliGRAM(s) Rectal daily PRN Constipation  morphine  - Injectable 1 milliGRAM(s) IV Push every 1 hour PRN pain  morphine  - Injectable 1 milliGRAM(s) IV Push every 1 hour PRN dyspnea    ITEMS UNCHECKED ARE NOT PRESENT    PRESENT SYMPTOMS: [X ]Unable to obtain due to poor mentation   Source if other than patient:  [ ]Family   [ X]Team     Pain: [ ] yes [X ] no see PAIN AD score  QOL impact -   Location -                    Aggravating factors -  Quality -  Radiation -  Timing-  Severity (0-10 scale):  Minimal acceptable level (0-10 scale):     Dyspnea:                           [ ]Mild [ ]Moderate [ ]Severe  Anxiety:                             [ ]Mild [ ]Moderate [ ]Severe  Fatigue:                             [ ]Mild [ ]Moderate [ ]Severe  Nausea:                             [ ]Mild [ ]Moderate [ ]Severe  Loss of appetite:              [ ]Mild [ ]Moderate [ ]Severe  Constipation:                    [ ]Mild [ ]Moderate [ ]Severe    PAINAD Score:0    http://geriatrictoolkit.[place].edu/cog/painad.pdf (Ctrl +  left click to view)  		  Other Symptoms:  [ X]All other review of systems negative     Palliative Performance Status Version 2: 10 %         http://npcrc.org/files/news/palliative_performance_scale_ppsv2.pdf  PHYSICAL EXAM:  Vital Signs Last 24 Hrs  T(C): 37.1 (19 Mar 2021 07:24), Max: 37.1 (18 Mar 2021 19:07)  T(F): 98.7 (19 Mar 2021 07:24), Max: 98.7 (18 Mar 2021 19:07)  HR: 98 (19 Mar 2021 07:24) (79 - 114)  BP: 125/68 (19 Mar 2021 07:24) (119/67 - 138/80)  BP(mean): --  RR: 22 (19 Mar 2021 07:24) (18 - 22)  SpO2: 89% (19 Mar 2021 07:24) (89% - 99%) I&O's Summary  GENERAL:  [ ]Alert  [ ]Oriented x   [ X]Lethargic  [ ]Cachexia  [ ]Unarousable  [ ]Verbal  [ X]Non-Verbal  Behavioral:   [ ] Anxiety  [ ] Delirium [ ] Agitation [ ] Other  HEENT:  [ ]Normal   [ X]Dry mouth   [ ]ET Tube/Trach  [ ]Oral lesions  PULMONARY:   [ ]Clear [ ]Tachypnea  [ X]Audible excessive secretions   [ ]Rhonchi        [ ]Right [ ]Left [ ]Bilateral  [ ]Crackles        [ ]Right [ ]Left [ ]Bilateral  [ ]Wheezing     [ ]Right [ ]Left [ ]Bilateral  [ ]Diminshed BS [ ]Right [ ]Left [ ]Bilateral    CARDIOVASCULAR:    [X ]Regular [ ]Irregular [ ]Tachy  [ ]Ja [ ]Murmur [ ]Other  GASTROINTESTINAL:  [ X]Soft  [ ]Distended   [ X]+BS  [X ]Non tender [ ]Tender  [ ]PEG [ ]OGT/ NGT   Last BM:  3/12/21  GENITOURINARY:  [ ]Normal [ X] Incontinent   [ ]Oliguria/Anuria   [ ]Delgado  MUSCULOSKELETAL:   [ ]Normal   [ X]Weakness  [ X]Bed/Wheelchair bound [ ]Edema  NEUROLOGIC:   [ ]No focal deficits  [X ] Cognitive impairment  [X ] Dysphagia [ ]Dysarthria [ ] Paresis [ ]Other- aphasia and hemiplegia   SKIN:   [ ]Normal  [ ]Rash     [X ]Pressure ulcer(s)  [ X]y [ ]n  Present on admission  Suspected DTI on thigh, Right heel stage 2 and left heel stage 3. Please see nursing assessment for further details    CRITICAL CARE:  [ ] Shock Present  [ ]Septic [ ]Cardiogenic [ ]Neurologic [ ]Hypovolemic  [ ]  Vasopressors [ ]  Inotropes   [ ] Respiratory failure present [ ] Mechanical Ventilation [ ] Non-invasive ventilatory support [ ] High-Flow  [ ] Acute  [ ] Chronic [ ] Hypoxic  [ ] Hypercarbic [ ] Other  [ X] Other organ failure- brain    LABS: None new    RADIOLOGY & ADDITIONAL STUDIES: None new    PROTEIN CALORIE MALNUTRITION: [ ] mild [ ] moderate [ ] severe  [ ] underweight [ ] morbid obesity    https://www.andeal.org/vault/2440/web/files/ONC/Table_Clinical%20Characteristics%20to%20Document%20Malnutrition-White%20JV%20et%20al%202012.pdf    Height (cm): 182 (03-18-21 @ 19:07), 182.9 (03-10-21 @ 12:35), 182.9 (12-28-20 @ 11:09)  Weight (kg): 67 (03-18-21 @ 19:07), 67.7 (03-10-21 @ 13:22), 77.1 (12-28-20 @ 11:09)  BMI (kg/m2): 20.2 (03-18-21 @ 19:07), 20.2 (03-10-21 @ 13:22), 23 (03-10-21 @ 12:35)    [ X] PPSV2 < or = 30% [ ] significant weight loss [ ] poor nutritional intake [ ] anasarca Albumin, Serum: 3.4 g/dL (03-10-21 @ 12:53)    Artificial Nutrition [ ]     REFERRALS:   [ ]Chaplaincy  [X ] Hospice  [ ]Child Life  [X ]Social Work  [ ]Case management [ ]Holistic Therapy [ ] Physical Therapy [ ] Dietary   Goals of Care Document:

## 2021-03-19 NOTE — PROGRESS NOTE ADULT - PROBLEM SELECTOR PLAN 3
Patient with b/l heel pressure ulcer and suspected tissue injury on right thigh  Continue with good skin care  Elevate heels  Turn and position Last BM on 3/12/21  Continue with bowel regimen

## 2021-03-19 NOTE — PROGRESS NOTE ADULT - PROBLEM SELECTOR PLAN 7
Patient inpatient hospice.  DNR/DNI  Spoke with the patients wife and provided her with updates. She will be visiting this afternoon.   Continue with symptom management in PCU

## 2021-03-19 NOTE — PROGRESS NOTE ADULT - PROBLEM SELECTOR PLAN 2
Last BM on 3/12/21  Continue with bowel regimen Morphine 1mg PRN, requirefd 3 x 24 hours Morphine 1mg PRN, required 3 x 24 hours

## 2021-03-19 NOTE — PROGRESS NOTE ADULT - PROBLEM SELECTOR PLAN 5
Patient inpatient hospice.  DNR/DNI  Spoke with the patients wife and provided her with updates. She will be visiting this afternoon.   Continue with symptom management in PCU Current PPSV-10%. Requires nursing assistant with all ADLs

## 2021-03-19 NOTE — PROGRESS NOTE ADULT - PROBLEM SELECTOR PLAN 4
Current PPSV-10%. Requires nursing assistant with all ADLs Patient with b/l heel pressure ulcer and suspected tissue injury on right thigh  Continue with good skin care  Elevate heels  Turn and position

## 2021-03-20 NOTE — PROGRESS NOTE ADULT - PROBLEM SELECTOR PLAN 7
Inpatient hospice  Continue symptom management.  DNR/DNI  Update provided to patient's wife and son via telephone. Inpatient hospice  Continue symptom management.  DNR/DNI  Per discussion with patient's son Phillip (neurologist in [place]), will trial IV D51/2NS @ 20 ml/hr. Son understands if respiratory symptoms worsen plan will be to decrease rate for patient's comfort. Inpatient hospice  Continue symptom management.  DNR/DNI  Per discussion with patient's son Phillip (neurologist in [place]), will trial IV D51/2NS @ 20 ml/hr. Son understands if respiratory symptoms worsen plan will be to decrease rate for patient's comfort.  Update provided to patient's wife.

## 2021-03-20 NOTE — PROGRESS NOTE ADULT - SUBJECTIVE AND OBJECTIVE BOX
GAP TEAM PALLIATIVE CARE UNIT PROGRESS NOTE:      [ x] Patient on hospice program.    INDICATION FOR PALLIATIVE CARE UNIT SERVICES: Symptom management in the setting of a presenting with a  Large L MCA CVA, aphasia, dysphagia, and hemiplegia    INTERVAL HPI/OVERNIGHT EVENTS: No acute events reported overnight. Pt required IV morphine 1mg x3 in last 24 hours for dyspnea. Receiving IV robinul 0.4mg q6h ATC for secretions.    DNR on chart: Yes      Allergies    No Known Allergies    Intolerances    MEDICATIONS  (STANDING):  collagenase Ointment 1 Application(s) Topical <User Schedule>  glycopyrrolate Injectable 0.4 milliGRAM(s) IV Push every 6 hours  metoprolol tartrate Injectable 5 milliGRAM(s) IV Push every 6 hours  sodium chloride 0.9%. 1000 milliLiter(s) (10 mL/Hr) IV Continuous <Continuous>    MEDICATIONS  (PRN):  acetaminophen  Suppository .. 650 milliGRAM(s) Rectal every 6 hours PRN Temp greater or equal to 38C (100.4F)  bisacodyl Suppository 10 milliGRAM(s) Rectal daily PRN Constipation  morphine  - Injectable 1 milliGRAM(s) IV Push every 1 hour PRN pain  morphine  - Injectable 1 milliGRAM(s) IV Push every 1 hour PRN dyspnea    ITEMS UNCHECKED ARE NOT PRESENT    PRESENT SYMPTOMS: [X ]Unable to obtain due to poor mentation   Source if other than patient:  [ ]Family   [ X]Team     Pain: [ ] yes [X ] no see PAIN AD score  QOL impact -   Location -                    Aggravating factors -  Quality -  Radiation -  Timing-  Severity (0-10 scale):  Minimal acceptable level (0-10 scale):     Dyspnea:                           [ ]Mild [ ]Moderate [ ]Severe  Anxiety:                             [ ]Mild [ ]Moderate [ ]Severe  Fatigue:                             [ ]Mild [ ]Moderate [ ]Severe  Nausea:                             [ ]Mild [ ]Moderate [ ]Severe  Loss of appetite:              [ ]Mild [ ]Moderate [ ]Severe  Constipation:                    [ ]Mild [ ]Moderate [ ]Severe    PAINAD Score:0    http://geriatrictoolkit.[place].edu/cog/painad.pdf (Ctrl +  left click to view)  		  Other Symptoms:  [ X]All other review of systems negative     Palliative Performance Status Version 2: 10 %         http://npcrc.org/files/news/palliative_performance_scale_ppsv2.pdf  PHYSICAL EXAM:  Vital Signs Last 24 Hrs  T(C): 36.3 (20 Mar 2021 08:28), Max: 37.5 (20 Mar 2021 07:22)  T(F): 97.4 (20 Mar 2021 08:28), Max: 99.5 (20 Mar 2021 07:22)  HR: 97 (20 Mar 2021 07:22) (85 - 104)  BP: 103/56 (20 Mar 2021 07:22) (93/49 - 112/69)  BP(mean): --  RR: 22 (20 Mar 2021 07:22) (22 - 22)  SpO2: 87% (20 Mar 2021 07:22) (87% - 87%)      GENERAL:  [ ]Alert  [ ]Oriented x   [ X]Lethargic  [ ]Cachexia  [ ]Unarousable  [ ]Verbal  [ X]Non-Verbal  Behavioral:   [ ] Anxiety  [ ] Delirium [ ] Agitation [ ] Other  HEENT:  [ ]Normal   [ X]Dry mouth   [ ]ET Tube/Trach  [ ]Oral lesions  PULMONARY:   [ ]Clear [ ]Tachypnea  [ X]Audible excessive secretions   [ ]Rhonchi        [ ]Right [ ]Left [ ]Bilateral  [ ]Crackles        [ ]Right [ ]Left [ ]Bilateral  [ ]Wheezing     [ ]Right [ ]Left [ ]Bilateral  [ ]Diminshed BS [ ]Right [ ]Left [ ]Bilateral    CARDIOVASCULAR:    [X ]Regular [ ]Irregular [ ]Tachy  [ ]Ja [ ]Murmur [ ]Other  GASTROINTESTINAL:  [ X]Soft  [ ]Distended   [ X]+BS  [X ]Non tender [ ]Tender  [ ]PEG [ ]OGT/ NGT   Last BM:  3/12/21  GENITOURINARY:  [ ]Normal [ X] Incontinent   [ ]Oliguria/Anuria   [ ]Delgado  MUSCULOSKELETAL:   [ ]Normal   [ X]Weakness  [ X]Bed/Wheelchair bound [ ]Edema  NEUROLOGIC:   [ ]No focal deficits  [X ] Cognitive impairment  [X ] Dysphagia [ ]Dysarthria [ ] Paresis [x]Other- aphasia and hemiplegia   SKIN:   [ ]Normal  [ ]Rash     [X ]Pressure ulcer(s)  [ X]y [ ]n  Present on admission    Please see nursing assessment for details    CRITICAL CARE:  [ ] Shock Present  [ ]Septic [ ]Cardiogenic [ ]Neurologic [ ]Hypovolemic  [ ]  Vasopressors [ ]  Inotropes   [ ] Respiratory failure present [ ] Mechanical Ventilation [ ] Non-invasive ventilatory support [ ] High-Flow  [ ] Acute  [ ] Chronic [ ] Hypoxic  [ ] Hypercarbic [ ] Other  [ X] Other organ failure- brain    LABS: None new    RADIOLOGY & ADDITIONAL STUDIES: None new    PROTEIN CALORIE MALNUTRITION: [ ] mild [ ] moderate [ ] severe  [ ] underweight [ ] morbid obesity    https://www.andeal.org/vault/2440/web/files/ONC/Table_Clinical%20Characteristics%20to%20Document%20Malnutrition-White%20JV%20et%20al%202012.pdf    Height (cm): 182 (03-18-21 @ 19:07), 182.9 (03-10-21 @ 12:35), 182.9 (12-28-20 @ 11:09)  Weight (kg): 67 (03-18-21 @ 19:07), 67.7 (03-10-21 @ 13:22), 77.1 (12-28-20 @ 11:09)  BMI (kg/m2): 20.2 (03-18-21 @ 19:07), 20.2 (03-10-21 @ 13:22), 23 (03-10-21 @ 12:35)    [ X] PPSV2 < or = 30% [ ] significant weight loss [ ] poor nutritional intake [ ] anasarca Albumin, Serum: 3.4 g/dL (03-10-21 @ 12:53)    Artificial Nutrition [ ]     REFERRALS:   [ ]Chaplaincy  [X ] Hospice  [ ]Child Life  [X ]Social Work  [ ]Case management [ ]Holistic Therapy [ ] Physical Therapy [ ] Dietary   Goals of Care Document:

## 2021-03-20 NOTE — PROGRESS NOTE ADULT - PROBLEM SELECTOR PLAN 4
Patient with b/l heel pressure ulcer and suspected tissue injury on right thigh  Continue with good skin care  Elevate heels  Turn and position

## 2021-03-21 NOTE — PROGRESS NOTE ADULT - SUBJECTIVE AND OBJECTIVE BOX
GAP TEAM PALLIATIVE CARE UNIT PROGRESS NOTE:      [ x] Patient on hospice program.    INDICATION FOR PALLIATIVE CARE UNIT SERVICES: Symptom management in the setting of a presenting with a  Large L MCA CVA, aphasia, dysphagia, and hemiplegia    INTERVAL HPI/OVERNIGHT EVENTS: Per RN pt with continued excessive secretions. No PRN medicatiosn required in last 24 hours. On IV morphine and IV robinul ATC.     DNR on chart: Yes      Allergies    No Known Allergies    Intolerances    MEDICATIONS  (STANDING):  collagenase Ointment 1 Application(s) Topical <User Schedule>  glycopyrrolate Injectable 0.4 milliGRAM(s) IV Push every 6 hours  metoprolol tartrate Injectable 5 milliGRAM(s) IV Push every 6 hours  sodium chloride 0.9%. 1000 milliLiter(s) (10 mL/Hr) IV Continuous <Continuous>    MEDICATIONS  (PRN):  acetaminophen  Suppository .. 650 milliGRAM(s) Rectal every 6 hours PRN Temp greater or equal to 38C (100.4F)  bisacodyl Suppository 10 milliGRAM(s) Rectal daily PRN Constipation  morphine  - Injectable 1 milliGRAM(s) IV Push every 1 hour PRN pain  morphine  - Injectable 1 milliGRAM(s) IV Push every 1 hour PRN dyspnea    ITEMS UNCHECKED ARE NOT PRESENT    PRESENT SYMPTOMS: [X ]Unable to obtain due to poor mentation   Source if other than patient:  [ ]Family   [ X]Team     Pain: [ ] yes [X ] no see PAIN AD score  QOL impact -   Location -                    Aggravating factors -  Quality -  Radiation -  Timing-  Severity (0-10 scale):  Minimal acceptable level (0-10 scale):     Dyspnea:                           [ ]Mild [ ]Moderate [ ]Severe  Anxiety:                             [ ]Mild [ ]Moderate [ ]Severe  Fatigue:                             [ ]Mild [ ]Moderate [ ]Severe  Nausea:                             [ ]Mild [ ]Moderate [ ]Severe  Loss of appetite:              [ ]Mild [ ]Moderate [ ]Severe  Constipation:                    [ ]Mild [ ]Moderate [ ]Severe    PAINAD Score:0    http://geriatrictoolkit.[employer].edu/cog/painad.pdf (Ctrl +  left click to view)  		  Other Symptoms:  [ X]All other review of systems negative     Palliative Performance Status Version 2: 10 %         http://npcrc.org/files/news/palliative_performance_scale_ppsv2.pdf    PHYSICAL EXAM:  Vital Signs Last 24 Hrs  T(C): 36.4 (21 Mar 2021 06:48), Max: 36.4 (21 Mar 2021 06:48)  T(F): 97.5 (21 Mar 2021 06:48), Max: 97.5 (21 Mar 2021 06:48)  HR: 92 (21 Mar 2021 06:48) (92 - 123)  BP: 136/80 (21 Mar 2021 06:48) (109/62 - 146/83)  BP(mean): --  RR: 20 (21 Mar 2021 06:48) (20 - 20)  SpO2: 96% (21 Mar 2021 06:48) (96% - 96%)    GENERAL:  [ ]Alert  [ ]Oriented x   [ X]Lethargic  [ ]Cachexia  [ ]Unarousable  [ ]Verbal  [ X]Non-Verbal  Behavioral:   [ ] Anxiety  [ ] Delirium [ ] Agitation [ ] Other  HEENT:  [ ]Normal   [ X]Dry mouth   [ ]ET Tube/Trach  [ ]Oral lesions  PULMONARY:   [ ]Clear [ ]Tachypnea  [ X]Audible excessive secretions   [ ]Rhonchi        [ ]Right [ ]Left [ ]Bilateral  [ ]Crackles        [ ]Right [ ]Left [ ]Bilateral  [ ]Wheezing     [ ]Right [ ]Left [ ]Bilateral  [ ]Diminshed BS [ ]Right [ ]Left [ ]Bilateral    CARDIOVASCULAR:    [X ]Regular [ ]Irregular [ ]Tachy  [ ]Ja [ ]Murmur [ ]Other  GASTROINTESTINAL:  [ X]Soft  [ ]Distended   [ X]+BS  [X ]Non tender [ ]Tender  [ ]PEG [ ]OGT/ NGT   Last BM:  no BM documented  GENITOURINARY:  [ ]Normal [ X] Incontinent   [ ]Oliguria/Anuria   [ ]Delgado  MUSCULOSKELETAL:   [ ]Normal   [ X]Weakness  [ X]Bed/Wheelchair bound [ ]Edema  NEUROLOGIC:   [ ]No focal deficits  [X ] Cognitive impairment  [X ] Dysphagia [ ]Dysarthria [ ] Paresis [x]Other- aphasia and hemiplegia   SKIN:   [ ]Normal  [ ]Rash     [X ]Pressure ulcer(s)  [ X]y [ ]n  Present on admission    Please see nursing assessment for details    CRITICAL CARE:  [ ] Shock Present  [ ]Septic [ ]Cardiogenic [ ]Neurologic [ ]Hypovolemic  [ ]  Vasopressors [ ]  Inotropes   [ ] Respiratory failure present [ ] Mechanical Ventilation [ ] Non-invasive ventilatory support [ ] High-Flow  [ ] Acute  [ ] Chronic [ ] Hypoxic  [ ] Hypercarbic [ ] Other  [ X] Other organ failure- brain    LABS: None new    RADIOLOGY & ADDITIONAL STUDIES: None new    PROTEIN CALORIE MALNUTRITION: [ ] mild [ ] moderate [ ] severe  [ ] underweight [ ] morbid obesity    https://www.andeal.org/vault/2440/web/files/ONC/Table_Clinical%20Characteristics%20to%20Document%20Malnutrition-White%20JV%20et%20al%612924.pdf    Height (cm): 182 (03-18-21 @ 19:07), 182.9 (03-10-21 @ 12:35), 182.9 (12-28-20 @ 11:09)  Weight (kg): 67 (03-18-21 @ 19:07), 67.7 (03-10-21 @ 13:22), 77.1 (12-28-20 @ 11:09)  BMI (kg/m2): 20.2 (03-18-21 @ 19:07), 20.2 (03-10-21 @ 13:22), 23 (03-10-21 @ 12:35)    [ X] PPSV2 < or = 30% [ ] significant weight loss [ ] poor nutritional intake [ ] anasarca Albumin, Serum: 3.4 g/dL (03-10-21 @ 12:53)    Artificial Nutrition [ ]     REFERRALS:   [ ]Chaplaincy  [X ] Hospice  [ ]Child Life  [X ]Social Work  [ ]Case management [ ]Holistic Therapy [ ] Physical Therapy [ ] Dietary   Goals of Care Document:

## 2021-03-22 NOTE — PROGRESS NOTE ADULT - PROBLEM SELECTOR PLAN 5
Clinically declining.  Significant neurologic impairment coupled with other co-morbidities.  Prognosis is poor for survival to discharge.

## 2021-03-22 NOTE — PROGRESS NOTE ADULT - SUBJECTIVE AND OBJECTIVE BOX
GAP TEAM PALLIATIVE CARE UNIT PROGRESS NOTE:      [  ] Patient on hospice program.    INDICATION FOR PALLIATIVE CARE UNIT SERVICES: Symptom management in the setting of a presenting with a  Large L MCA CVA, aphasia, dysphagia, and hemiplegia    INTERVAL HPI/OVERNIGHT EVENTS: Chart reviewed. The patient is seen and examined at the bedside. Patient is lethargic with audible secretion. The patient required PRN Morphine 1mg IV X1 for dyspnea within a 24hr period(8am-8am)    DNR on chart: Yes      Allergies    No Known Allergies    Intolerances    MEDICATIONS  (STANDING):  dextrose 5% + sodium chloride 0.45%. 1000 milliLiter(s) (20 mL/Hr) IV Continuous <Continuous>  glycopyrrolate Injectable 0.4 milliGRAM(s) IV Push every 4 hours  morphine  - Injectable 1 milliGRAM(s) IV Push every 6 hours    MEDICATIONS  (PRN):  acetaminophen  Suppository .. 650 milliGRAM(s) Rectal every 6 hours PRN Temp greater or equal to 38C (100.4F)  bisacodyl Suppository 10 milliGRAM(s) Rectal daily PRN Constipation  metoprolol tartrate Injectable 5 milliGRAM(s) IV Push every 6 hours PRN HTN/ tachycardia  morphine  - Injectable 1 milliGRAM(s) IV Push every 1 hour PRN pain  morphine  - Injectable 1 milliGRAM(s) IV Push every 1 hour PRN dyspnea    ITEMS UNCHECKED ARE NOT PRESENT    PRESENT SYMPTOMS: [X ]Unable to obtain due to poor mentation   Source if other than patient:  [ ]Family   [ X]Team     Pain: [ ] yes [X ] no see PAIN AD score  QOL impact -   Location -                    Aggravating factors -  Quality -  Radiation -  Timing-  Severity (0-10 scale):  Minimal acceptable level (0-10 scale):     Dyspnea:                           [ ]Mild [ ]Moderate [ ]Severe  Anxiety:                             [ ]Mild [ ]Moderate [ ]Severe  Fatigue:                             [ ]Mild [ ]Moderate [ ]Severe  Nausea:                             [ ]Mild [ ]Moderate [ ]Severe  Loss of appetite:              [ ]Mild [ ]Moderate [ ]Severe  Constipation:                    [ ]Mild [ ]Moderate [ ]Severe    PAINAD Score:    [email or web address].[email or web address].[email or web address]/cog/painad.pdf (Ctrl +  left click to view)  		  Other Symptoms:  [ X]All other review of systems negative     Palliative Performance Status Version 2:  10 %         http://npcrc.org/files/news/palliative_performance_scale_ppsv2.pdf  PHYSICAL EXAM:  Vital Signs Last 24 Hrs  T(C): 37.1 (22 Mar 2021 08:30), Max: 37.1 (22 Mar 2021 08:30)  T(F): 98.8 (22 Mar 2021 08:30), Max: 98.8 (22 Mar 2021 08:30)  HR: 93 (22 Mar 2021 08:30) (92 - 109)  BP: 99/63 (22 Mar 2021 08:30) (99/63 - 132/81)  BP(mean): --  RR: 20 (22 Mar 2021 08:30) (20 - 22)  SpO2: 92% (22 Mar 2021 08:30) (88% - 92%) I&O's Summary    21 Mar 2021 07:01  -  22 Mar 2021 07:00  --------------------------------------------------------  IN: 0 mL / OUT: 250 mL / NET: -250 mL    GENERAL:  [ ]Alert  [ ]Oriented x   [ X]Lethargic  [ ]Cachexia  [ X]Unarousable  [ ]Verbal  [X ]Non-Verbal  Behavioral:   [ ] Anxiety  [ ] Delirium [ ] Agitation [ ] Other  HEENT:  [ ]Normal   [ X]Dry mouth   [ ]ET Tube/Trach  [ ]Oral lesions  PULMONARY:   [ ]Clear [ ]Tachypnea  [X ]Audible excessive secretions   [ ]Rhonchi        [ ]Right [ ]Left [ ]Bilateral  [ ]Crackles        [ ]Right [ ]Left [ ]Bilateral  [ ]Wheezing     [ ]Right [ ]Left [ ]Bilateral  [ ]Diminshed BS [ ]Right [ ]Left [ ]Bilateral    CARDIOVASCULAR:    [X ]Regular [ ]Irregular [ ]Tachy  [ ]Ja [ ]Murmur [ ]Other  GASTROINTESTINAL:  [X ]Soft  [ ]Distended   [X ]+BS  [X ]Non tender [ ]Tender  [ ]PEG [ ]OGT/ NGT   Last BM: 3/21/21      GENITOURINARY:  [ ]Normal [X ] Incontinent   [ ]Oliguria/Anuria   [ x]Delgado  MUSCULOSKELETAL:   [ ]Normal   [ X]Weakness  [ X]Bed/Wheelchair bound [ ]Edema  NEUROLOGIC:   [ ]No focal deficits  [ X] Cognitive impairment  [ ] Dysphagia [ ]Dysarthria [ ] Paresis [ ]Other   SKIN:   [ ]Normal  [ ]Rash     [X ]Pressure ulcer(s)  [ ]y [ ]n  Present on admission  Right thigh DTI, right heel stage , left heel stage 3. Please see nursing assessment for further detail for which I have reviewed.     CRITICAL CARE:  [ ] Shock Present  [ ]Septic [ ]Cardiogenic [ ]Neurologic [ ]Hypovolemic  [ ]  Vasopressors [ ]  Inotropes   [ ] Respiratory failure present [ ] Mechanical Ventilation [ ] Non-invasive ventilatory support [ ] High-Flow  [ ] Acute  [ ] Chronic [ ] Hypoxic  [ ] Hypercarbic [ ] Other  [X ] Other organ failure- Brain    LABS: None new    RADIOLOGY & ADDITIONAL STUDIES: None new    PROTEIN CALORIE MALNUTRITION: [ ] mild [ ] moderate [ ] severe  [ ] underweight [ ] morbid obesity    https://www.andeal.org/vault/2440/web/files/ONC/Table_Clinical%20Characteristics%20to%20Document%20Malnutrition-White%20JV%20et%20al%132375.pdf    Height (cm): 182 (03-18-21 @ 19:07), 182.9 (03-10-21 @ 12:35), 182.9 (12-28-20 @ 11:09)  Weight (kg): 67 (03-18-21 @ 19:07), 67.7 (03-10-21 @ 13:22), 77.1 (12-28-20 @ 11:09)  BMI (kg/m2): 20.2 (03-18-21 @ 19:07), 20.2 (03-10-21 @ 13:22), 23 (03-10-21 @ 12:35)    [X ] PPSV2 < or = 30% [ ] significant weight loss [X ] poor nutritional intake [ ] anasarca Albumin, Serum: 3.4 g/dL (03-10-21 @ 12:53)    Artificial Nutrition [ ]     REFERRALS:   [ X]Chaplaincy  [ ] Hospice  [ ]Child Life  [ X]Social Work  [ ]Case management [ ]Holistic Therapy [ ] Physical Therapy [ ] Dietary   Goals of Care Document:

## 2021-03-23 NOTE — PROGRESS NOTE ADULT - SUBJECTIVE AND OBJECTIVE BOX
GAP TEAM PALLIATIVE CARE UNIT PROGRESS NOTE:      [  ] Patient on hospice program.    INDICATION FOR PALLIATIVE CARE UNIT SERVICES: Symptom management in the setting of a presenting with a  Large L MCA CVA, aphasia, dysphagia, and hemiplegia    INTERVAL HPI/OVERNIGHT EVENTS: Chart reviewed The patient is seen and examined at the bedside. the patient required PRN morphine 1mg IV X2 within the last 24hrs(8am-8am)    DNR on chart: Yes      Allergies    No Known Allergies    Intolerances    MEDICATIONS  (STANDING):  dextrose 5% + sodium chloride 0.45%. 1000 milliLiter(s) (20 mL/Hr) IV Continuous <Continuous>  glycopyrrolate Injectable 0.4 milliGRAM(s) IV Push every 4 hours  morphine  - Injectable 1 milliGRAM(s) IV Push every 6 hours    MEDICATIONS  (PRN):  acetaminophen  Suppository .. 650 milliGRAM(s) Rectal every 6 hours PRN Temp greater or equal to 38C (100.4F)  bisacodyl Suppository 10 milliGRAM(s) Rectal daily PRN Constipation  metoprolol tartrate Injectable 5 milliGRAM(s) IV Push every 6 hours PRN HTN/ tachycardia  morphine  - Injectable 1 milliGRAM(s) IV Push every 1 hour PRN pain  morphine  - Injectable 1 milliGRAM(s) IV Push every 1 hour PRN dyspnea    ITEMS UNCHECKED ARE NOT PRESENT    PRESENT SYMPTOMS: [X ]Unable to obtain due to poor mentation   Source if other than patient:  [ ]Family   [X ]Team     Pain: [ ] yes [X ] no see PAIN AD score  QOL impact -   Location -                    Aggravating factors -  Quality -  Radiation -  Timing-  Severity (0-10 scale):  Minimal acceptable level (0-10 scale):     Dyspnea:                           [ ]Mild [ ]Moderate [ ]Severe  Anxiety:                             [ ]Mild [ ]Moderate [ ]Severe  Fatigue:                             [ ]Mild [ ]Moderate [ ]Severe  Nausea:                             [ ]Mild [ ]Moderate [ ]Severe  Loss of appetite:              [ ]Mild [ ]Moderate [ ]Severe  Constipation:                    [ ]Mild [ ]Moderate [ ]Severe    PAINAD Score:    [email or web address].[email or web address].[email or web address]/cog/painad.pdf (Ctrl +  left click to view)  		  Other Symptoms:  [ X]All other review of systems negative     Palliative Performance Status Version 2: 10 %         http://npcrc.org/files/news/palliative_performance_scale_ppsv2.pdf  PHYSICAL EXAM:  Vital Signs Last 24 Hrs  T(C): 37.1 (23 Mar 2021 08:00), Max: 38 (23 Mar 2021 05:23)  T(F): 98.7 (23 Mar 2021 08:00), Max: 100.4 (23 Mar 2021 05:23)  HR: 102 (23 Mar 2021 08:00) (102 - 102)  BP: 78/48 (23 Mar 2021 08:00) (78/48 - 78/48)  BP(mean): --  RR: 22 (23 Mar 2021 08:00) (22 - 22)  SpO2: 92% (23 Mar 2021 08:00) (92% - 92%) I&O's Summary    22 Mar 2021 07:01  -  23 Mar 2021 07:00  --------------------------------------------------------  IN: 480 mL / OUT: 0 mL / NET: 480 mL    GENERAL:  [ ]Alert  [ ]Oriented x   [ X]Lethargic  [ ]Cachexia  [ X]Unarousable  [ ]Verbal  [X ]Non-Verbal  Behavioral:   [ ] Anxiety  [ ] Delirium [ ] Agitation [ ] Other  HEENT:  [ ]Normal   [ X]Dry mouth   [ ]ET Tube/Trach  [ ]Oral lesions  PULMONARY:   [ ]Clear [ ]Tachypnea  [X ]Audible excessive secretions   [ ]Rhonchi        [ ]Right [ ]Left [ ]Bilateral  [ ]Crackles        [ ]Right [ ]Left [ ]Bilateral  [ ]Wheezing     [ ]Right [ ]Left [ ]Bilateral  [ ]Diminshed BS [ ]Right [ ]Left [ ]Bilateral    CARDIOVASCULAR:    [X ]Regular [ ]Irregular [ ]Tachy  [ ]Ja [ ]Murmur [ ]Other  GASTROINTESTINAL:  [X ]Soft  [ ]Distended   [X ]+BS  [X ]Non tender [ ]Tender  [ ]PEG [ ]OGT/ NGT   Last BM: 3/21/21      GENITOURINARY:  [ ]Normal [X ] Incontinent   [ ]Oliguria/Anuria   [ X]Delgado  MUSCULOSKELETAL:   [ ]Normal   [ X]Weakness  [ X]Bed/Wheelchair bound [ ]Edema  NEUROLOGIC:   [ ]No focal deficits  [ X] Cognitive impairment  [ ] Dysphagia [ ]Dysarthria [ ] Paresis [ ]Other   SKIN:   [ ]Normal  [ ]Rash     [X ]Pressure ulcer(s)  [ ]y [ ]n  Present on admission  Right thigh DTI, right heel stage , left heel stage 3. Please see nursing assessment for further detail for which I have reviewed.     CRITICAL CARE:  [ ] Shock Present  [ ]Septic [ ]Cardiogenic [ ]Neurologic [ ]Hypovolemic  [ ]  Vasopressors [ ]  Inotropes   [ ] Respiratory failure present [ ] Mechanical Ventilation [ ] Non-invasive ventilatory support [ ] High-Flow  [ ] Acute  [ ] Chronic [ ] Hypoxic  [ ] Hypercarbic [ ] Other  [X ] Other organ failure- Brain      LABS: None new    RADIOLOGY & ADDITIONAL STUDIES: None new    PROTEIN CALORIE MALNUTRITION: [ ] mild [ ] moderate [ ] severe  [ ] underweight [ ] morbid obesity    https://www.andeal.org/vault/2440/web/files/ONC/Table_Clinical%20Characteristics%20to%20Document%20Malnutrition-White%20JV%20et%20al%895337.pdf    Height (cm): 182 (03-18-21 @ 19:07), 182.9 (03-10-21 @ 12:35), 182.9 (12-28-20 @ 11:09)  Weight (kg): 67 (03-18-21 @ 19:07), 67.7 (03-10-21 @ 13:22), 77.1 (12-28-20 @ 11:09)  BMI (kg/m2): 20.2 (03-18-21 @ 19:07), 20.2 (03-10-21 @ 13:22), 23 (03-10-21 @ 12:35)    [ X] PPSV2 < or = 30% [ ] significant weight loss [X ] poor nutritional intake [ ] anasarca Albumin, Serum: 3.4 g/dL (03-10-21 @ 12:53)    Artificial Nutrition [ ]     REFERRALS:   [X ]Chaplaincy  [ ] Hospice  [ ]Child Life  [ X]Social Work  [ ]Case management [ ]Holistic Therapy [ ] Physical Therapy [ ] Dietary   Goals of Care Document:

## 2021-03-24 NOTE — GOALS OF CARE CONVERSATION - ADVANCED CARE PLANNING - CONVERSATION DETAILS
I apprised the son of the symptoms exhibited by the patient due to prolonged IVF: tachypnea, increased work of breathing, and large amounts of secretions. My recommendations were to discontinue IVF given the benefit/burden profile.  The wife and children agreed to discontinuation of fluids.

## 2021-03-24 NOTE — PROGRESS NOTE ADULT - PROBLEM SELECTOR PROBLEM 6
Cerebrovascular accident (CVA) due to embolism of left middle cerebral artery
Encounter for palliative care
Cerebrovascular accident (CVA) due to embolism of left middle cerebral artery
Encounter for palliative care
Cerebrovascular accident (CVA) due to embolism of left middle cerebral artery
Cerebrovascular accident (CVA) due to embolism of left middle cerebral artery

## 2021-03-24 NOTE — PROGRESS NOTE ADULT - ASSESSMENT
89y/o M with PMH of afib (no AC), CAD s/p CABG, s/p PPM, HTN, BPH, and dementia presenting with a large L MCA CVA and aphasia, dysphagia and hemiplegia. Palliative was consulted for GOC.  Patient was discharged to inpatient hospice in the PCU on 3/18/21 for symptom management. 
89y/o M with PMH of afib (no AC), CAD s/p CABG, s/p PPM, HTN, BPH, and dementia presenting with a large L MCA CVA and aphasia, dysphagia and hemiplegia. Palliative was consulted for GOC.  Patient was discharged to inpatient hospice in the PCU on 3/18/21 for symptom management. 
89y/o M with PMH of afib (no AC), CAD s/p CABG, s/p PPM, HTN, BPH, and dementia presenting with a large L MCA CVA and aphasia, dysphagia and hemiplegia. Palliative was consulted for GOC.  Patient was discharged to inpatient hospice in the PCU on 3/18/21 for symptom management. 
89y/o M with PMH of afib (no AC), CAD s/p CABG, s/p PPM, HTN, BPH, and dementia presenting with a large L MCA CVA and aphasia, dysphagia and hemiplegia. Palliative was consulted for GOC.  Patient was discharged to inpatient hospice in the PCU on 3/18/21 for symptom management. 
89y/o M with PMH of afib (no AC), CAD s/p CABG, s/p PPM, HTN, BPH, and dementia presenting with a large L MCA CVA and aphasia, dysphagia and hemiplegia. Palliative was consulted for GOC.  Patient was discharged to inpatient hospice in the PCU on 3/18/21 for symptom management. 
89y/o M with PMH of afib (no AC), CAD s/p CABG, s/p PPM, HTN, BPH, and dementia presenting with a large L MCA CVA and aphasia, dysphagia and hemiplegia. Palliative was consulted for GOC.  Patient was discharged to inpatient hospice in the PCU on 3/18/21 for symptom management.

## 2021-03-24 NOTE — PROGRESS NOTE ADULT - PROBLEM SELECTOR PLAN 6
Continue symptom management and end of life care in PCU  Spoke with the patient's spouse via phone. Provided her with updates and emotional support.  Patient is more active today BP 78/48. Patient still with audible secretions. Spoke to the patient's spouse about the risk of IVFs at this time. Spouse would like to continue the IVFs. She will be here this afternoon
Clinically declining.  Significant neurologic impairment coupled with other co-morbidities.  Prognosis is poor for survival to discharge.
Continue symptom management and end of life care in PCU  Jeanine( HCN ) provided emotional support to the patient's spouse via phone.  Spouse to come in this afternoon.
Clinically declining.  Significant neurologic impairment coupled with other co-morbidities.  Prognosis is poor for survival to discharge.

## 2021-03-24 NOTE — PROGRESS NOTE ADULT - SUBJECTIVE AND OBJECTIVE BOX
GAP TEAM PALLIATIVE CARE UNIT PROGRESS NOTE:      [  ] Patient on hospice program.    INDICATION FOR PALLIATIVE CARE UNIT SERVICES: Symptom management in the setting of a presenting with a  Large L MCA CVA, aphasia, dysphagia, and hemiplegia    INTERVAL HPI/OVERNIGHT EVENTS: Chart reviewed. The patient is seen and examined at the bedside. The patient required PRN morphine 1mg IV X1 within the last 24hrs(8am-8am). The primary RN reports that the patient needed to be suction multiple times last night. She states that she feels like he is drowning in fluids. Will speak to spouse again today about discontinuing IVFs.    DNR on chart: Yes      Allergies    No Known Allergies    Intolerances    MEDICATIONS  (STANDING):  dextrose 5% + sodium chloride 0.45%. 1000 milliLiter(s) (20 mL/Hr) IV Continuous <Continuous>  glycopyrrolate Injectable 0.4 milliGRAM(s) IV Push every 4 hours  morphine  - Injectable 1 milliGRAM(s) IV Push every 6 hours    MEDICATIONS  (PRN):  acetaminophen  Suppository .. 650 milliGRAM(s) Rectal every 6 hours PRN Temp greater or equal to 38C (100.4F)  bisacodyl Suppository 10 milliGRAM(s) Rectal daily PRN Constipation  metoprolol tartrate Injectable 5 milliGRAM(s) IV Push every 6 hours PRN HTN/ tachycardia  morphine  - Injectable 1 milliGRAM(s) IV Push every 1 hour PRN mild to severe pain  morphine  - Injectable 1 milliGRAM(s) IV Push every 1 hour PRN dyspnea    ITEMS UNCHECKED ARE NOT PRESENT    PRESENT SYMPTOMS: [X ]Unable to obtain due to poor mentation   Source if other than patient:  [ ]Family   [X ]Team     Pain: [ ] yes [ X] no see PAIN AD score  QOL impact -   Location -                    Aggravating factors -  Quality -  Radiation -  Timing-  Severity (0-10 scale):  Minimal acceptable level (0-10 scale):     Dyspnea:                           [ ]Mild [ ]Moderate [ ]Severe  Anxiety:                             [ ]Mild [ ]Moderate [ ]Severe  Fatigue:                             [ ]Mild [ ]Moderate [ ]Severe  Nausea:                             [ ]Mild [ ]Moderate [ ]Severe  Loss of appetite:              [ ]Mild [ ]Moderate [ ]Severe  Constipation:                    [ ]Mild [ ]Moderate [ ]Severe    PAINAD Score: 0    [email or web address] (Ctrl +  left click to view)  		  Other Symptoms:  [X ]All other review of systems negative     Palliative Performance Status Version 2:  10%         [email or web address]/[email or web address]_[email or web address]  PHYSICAL EXAM:  Vital Signs Last 24 Hrs  T(C): 37.3 (24 Mar 2021 07:50), Max: 37.3 (24 Mar 2021 07:50)  T(F): 99.1 (24 Mar 2021 07:50), Max: 99.1 (24 Mar 2021 07:50)  HR: 107 (24 Mar 2021 07:50) (107 - 107)  BP: 90/60 (24 Mar 2021 07:50) (90/60 - 90/60)  BP(mean): --  RR: 24 (24 Mar 2021 07:50) (24 - 24)  SpO2: 85% (24 Mar 2021 07:50) (85% - 85%) I&O's Summary    23 Mar 2021 07:01  -  24 Mar 2021 07:00  --------------------------------------------------------  IN: 0 mL / OUT: 50 mL / NET: -50 mL    GENERAL:  [ ]Alert  [ ]Oriented x   [ X]Lethargic  [ ]Cachexia  [ X]Unarousable  [ ]Verbal  [X ]Non-Verbal  Behavioral:   [ ] Anxiety  [ ] Delirium [ ] Agitation [ ] Other  HEENT:  [ ]Normal   [ X]Dry mouth   [ ]ET Tube/Trach  [ ]Oral lesions  PULMONARY:   [ ]Clear [ ]Tachypnea  [X ]Audible excessive secretions   [ ]Rhonchi        [ ]Right [ ]Left [ ]Bilateral  [ ]Crackles        [ ]Right [ ]Left [ ]Bilateral  [ ]Wheezing     [ ]Right [ ]Left [ ]Bilateral  [ ]Diminshed BS [ ]Right [ ]Left [ ]Bilateral    CARDIOVASCULAR:    [ ]Regular [ ]Irregular [X ]Tachy  [ ]Ja [ ]Murmur [ ]Other  GASTROINTESTINAL:  [X ]Soft  [ ]Distended   [X ]+BS  [X ]Non tender [ ]Tender  [ ]PEG [ ]OGT/ NGT   Last BM: 3/21/21      GENITOURINARY:  [ ]Normal [X ] Incontinent   [ ]Oliguria/Anuria   [ X]Delgado  MUSCULOSKELETAL:   [ ]Normal   [ X]Weakness  [ X]Bed/Wheelchair bound [ ]Edema  NEUROLOGIC:   [ ]No focal deficits  [ X] Cognitive impairment  [X ] Dysphagia [ ]Dysarthria [ ] Paresis [ ]Other   SKIN:   [ ]Normal  [ ]Rash     [X ]Pressure ulcer(s)  [ ]y [ ]n  Present on admission  Right thigh DTI, right heel stage , left heel stage 3. Please see nursing assessment for further detail for which I have reviewed.     CRITICAL CARE:  [ ] Shock Present  [ ]Septic [ ]Cardiogenic [ ]Neurologic [ ]Hypovolemic  [ ]  Vasopressors [ ]  Inotropes   [ ] Respiratory failure present [ ] Mechanical Ventilation [ ] Non-invasive ventilatory support [ ] High-Flow  [ ] Acute  [ ] Chronic [ ] Hypoxic  [ ] Hypercarbic [ ] Other  [X ] Other organ failure- Brain      LABS: None new    RADIOLOGY & ADDITIONAL STUDIES: None new    PROTEIN CALORIE MALNUTRITION: [ ] mild [ ] moderate [ ] severe  [ ] underweight [ ] morbid obesity    https://www.andeal.org/vault/2440/web/files/ONC/Table_Clinical%20Characteristics%20to%20Document%20Malnutrition-White%20JV%20et%20al%443112.pdf    Height (cm): 182 (03-18-21 @ 19:07), 182.9 (03-10-21 @ 12:35), 182.9 (12-28-20 @ 11:09)  Weight (kg): 67 (03-18-21 @ 19:07), 67.7 (03-10-21 @ 13:22), 77.1 (12-28-20 @ 11:09)  BMI (kg/m2): 20.2 (03-18-21 @ 19:07), 20.2 (03-10-21 @ 13:22), 23 (03-10-21 @ 12:35)    [X ] PPSV2 < or = 30% [ ] significant weight loss [ ] poor nutritional intake [ ] anasarca Albumin, Serum: 3.4 g/dL (03-10-21 @ 12:53)    Artificial Nutrition [ ]     REFERRALS:   [ ]Chaplaincy  [ ] Hospice  [ ]Child Life  [X ]Social Work  [ ]Case management [ ]Holistic Therapy [ ] Physical Therapy [ ] Dietary   Goals of Care Document:

## 2021-03-24 NOTE — PROGRESS NOTE ADULT - REASON FOR ADMISSION
Right sided weakness

## 2021-03-24 NOTE — PROGRESS NOTE ADULT - PROBLEM SELECTOR PLAN 3
Hypoactive vs terminal delirium   Monitor for constipation, urinary retention, pain, hunger, thirst, etc.  Promote sleep wake cycle and reorientation as indicated. Hypoactive vs terminal delirium   Monitor for constipation, urinary retention, pain, hunger, thirst, etc.  Promote sleep wake cycle and reorientation as indicated.  No responsive or communicative  Partially upright and gaping

## 2021-03-24 NOTE — PROGRESS NOTE ADULT - ATTENDING COMMENTS
Pt is obtunded  No overt symptom needs  SW involvement today appreciated, useful collateral obtained  PAINAD 0
The patient was seen and examined  Annotations are embedded above  Predominant symptom(s): hypoactive delirium   Relevant factors: Steepening EOL trajectory  Interdisciplinary Team Involvement  Recommendations: No change in care  Action Items: Offered prognosis days to week    Total time spent including the following  []chart review  []care coordination  []discussion with the primary team  [x]discussion with the patient, surrogate decision maker, or family  Time spent: > 20 min      In the event of newly developing, evolving, or worsening symptoms, please contact the Palliative Medicine team via pager (if the patient is at [facility] #[phone] or if the patient is at [facility] #[phone]) The Geriatric and Palliative Medicine service has coverage 24 hours a day/ 7 days a week to provide medical recommendations regarding symptom management needs via telephone        Chandu Webster MD   of Geriatric and Palliative Medicine  [facility]      Please page the following number for clinical matters between the hours of 9 am and 5 pm   from Monday through Friday : [phone]    After 5pm and on weekends, please see the contact information below:    In the event of newly developing, evolving, or worsening symptoms, please contact the Palliative Medicine team via pager (if the patient is at [facility] #[phone] or if the patient is at [facility] #[phone]) The Geriatric and Palliative Medicine service has coverage 24 hours a day/ 7 days a week to provide medical recommendations regarding symptom management needs via telephone
I have personally seen and examined this patient and agree with the above assessment and plan, which I have reviewed and edited where appropriate.   Patient transferred to us as inpatient hospice for end of life care in the setting of acute L MCA stroke with the sx of dyspnea/secretions and skin injury.  Extensive discussion with son  Raz Arce, a neurologist in [place].  He is struggling with the line between prolonging a dying process and withholding hydration.  Explain the current situation and my recommendation to following a care plan to minimize sx during the dying process.  We agreed to a trial of a slight increase in fluids with D5W1/2ns at 20-30cc/hour.  He understands that if sx get worse, this will be stopped.  Time spent in non fact-to-face discussion 25 minutes.
Pt seen with fellow.  Agree with above.  continue dilaudid atc and prn.  increase robinol to Q 4hours atc.  End of life care.  Emotional support provided to family.
The patient was seen and examined  Annotations are embedded above  Predominant symptom(s) hypoactive delirium and secretions  Relevant factors: EOL trajectory steeping  Interdisciplinary Team Involvement  Recommendations:Monitor for evolution of symptoms  Action Items: Chaplaincy support for family          In the event of newly developing, evolving, or worsening symptoms, please contact the Palliative Medicine team via pager (if the patient is at [facility] #[phone] or if the patient is at [facility] #[phone]) The Geriatric and Palliative Medicine service has coverage 24 hours a day/ 7 days a week to provide medical recommendations regarding symptom management needs via telephone        Chandu Webster MD   of Geriatric and Palliative Medicine  [facility]      Please page the following number for clinical matters between the hours of 9 am and 5 pm   from Monday through Friday : [phone]    After 5pm and on weekends, please see the contact information below:    In the event of newly developing, evolving, or worsening symptoms, please contact the Palliative Medicine team via pager (if the patient is at [facility] #[phone] or if the patient is at [facility] #[phone]) The Geriatric and Palliative Medicine service has coverage 24 hours a day/ 7 days a week to provide medical recommendations regarding symptom management needs via telephone
Pt seen with fellow.  Agree with above.  CVA with dyspnea, secretions.  Would benefit from atc morphine 1mg q 6 hours.  Will clarify with son Dr. Arce.  Continue prn.  Ativan prn.  Pt is actively dying.  Family preferred low dose iv fluids and agreed if secretions worsen will decrease.

## 2021-03-24 NOTE — PROGRESS NOTE ADULT - PROBLEM SELECTOR PLAN 1
Continue with Robinul 0.4mg IV to q4h ATC  Turn and position Continue with Robinul 0.4mg IV to q4h ATC  Turn and position  Discussed with the wife regarding IVF discontinuation  Counseled on benefits and burdens  Asked to call the son who is a Neurologist in [place]  Call made and message left

## 2021-03-24 NOTE — PROGRESS NOTE ADULT - PROBLEM SELECTOR PROBLEM 1
Gurgling breath sounds

## 2021-03-24 NOTE — PROGRESS NOTE ADULT - PROBLEM SELECTOR PROBLEM 7
Encounter for palliative care

## 2021-03-25 NOTE — DISCHARGE NOTE FOR THE EXPIRED PATIENT - HOSPITAL COURSE
89 year old male with PMH of Afib (Not on eliquis per the wife), CABG, PPM, HTN, BPH, PMR, and dementia aaox1 per chart documentation, wife states that her  is usually aware of everyone in the family, he knows who the president is and she reports that last night he had a dream and was talking a lot this AM when the nursing aide came, she witnessed that the pt wasn't moving his R arm. She states that her son is a physician so she called him who told her to give aspirin. Pt per wife is able to go to the bathroom, w/ a walker and assistance from the nursing aide usually on his own. Pt is aphasic.    Patient found to have an acute left MCA infarct.  Given significant decline and clinical signs of an imminent dying process patient transitioned to inpatient hospice care for management of dyspnea and gurgling breath sounds. Palliative was consulted for GOC and symptom management. The patient was admitted to inpatient hospice in the PCU. Patient  on 3/25/21 at 2:30AM

## 2021-07-26 NOTE — CONSULT NOTE ADULT - ASSESSMENT
ASSESSMENT: 89yMale w/ PMH Afib (on Eliquis), s/p CABG, PPM, HTN, dementia, presenting due to fall two days ago. CT findings with extensive fractures of the R hemipelvis with nondisplaced fx of R femoral head, associated soft tissue hematomas involving R obturator internus and iliacus muscles, with extension into RP. Multiple R rib fx.    PLAN:   Neurologic:    Respiratory:    Cardiovascular:    Gastrointestinal/Nutrition:    Genitourinary/Renal:    Hematologic:    Infectious Disease:    Endocrine:    Disposition: ASSESSMENT: 89yMale w/ PMH Afib (on Eliquis), s/p CABG, PPM, HTN, dementia, presenting due to fall two days ago. CT findings with extensive fractures of the R hemipelvis with nondisplaced fx of R femoral head, associated soft tissue hematomas involving R obturator internus and iliacus muscles, with extension into RP. Multiple R rib fx.    PLAN:   Neuro  -A&Ox1 at baseline  -Pain: not reporting any pain. Tylenol    Resp  -Satting well on RA    CV  -Hx HTN. Holding home meds  -Lactate 1.4    GI  -NPO    /Renal  -500cc bolus NS  -IVF: LR @75cc/hr    Heme  -Hematoma in presacral space, hemorrhage watch  -Holding DVT ppx, eliquis    ID: [id number]    Endo: CLAY    MSK  -R 1-6 rib fx, L 10, 11 fx   -R inferior pubic ramus fx, R comminuted acetabulum fx, prior fx of R iliac wing with possible acute fx. R RP hematoma  -Per ortho, weight bearing as tolerated    Dispo: SICU 1

## 2022-04-11 PROBLEM — Z11.59 SCREENING FOR VIRAL DISEASE: Status: ACTIVE | Noted: 2020-01-01

## 2023-02-01 NOTE — PROGRESS NOTE ADULT - ASSESSMENT
89 year old male with PMH of Afib (Not on eliquis per the wife), CABG, PPM, HTN, BPH, PMR, and dementia aaox1 per chart documentation; brought to [facility] with new right-sided weakness; found to have a left MCA stroke on CT head, patient is aphasic. Mild leukocytosis on labs. Palliative discussed with wife and patient is now DNR/DNI. Admitted for possible transfer to home hospice.  No assistance needed

## 2023-02-24 NOTE — OCCUPATIONAL THERAPY INITIAL EVALUATION ADULT - FINE MOTOR COORDINATION, RIGHT HAND THUMB/FINGER OPPOSITION SKILLS, OT EVAL
Intubation    Date/Time: 2/24/2023 7:32 AM  Performed by: Yared Escobedo CRNA  Authorized by: Yared Escobedo CRNA     Intubation:     Induction:  Intravenous    Intubated:  Postinduction    Mask Ventilation:  Easy mask    Attempts:  1    Attempted By:  CRNA    Method of Intubation:  Direct    Blade:  Acevedo 4    Laryngeal View Grade: Grade I - full view of cords      Difficult Airway Encountered?: No      Complications:  None    Airway Device:  Oral endotracheal tube    Airway Device Size:  7.5    Style/Cuff Inflation:  Cuffed    Inflation Amount (mL):  5    Tube secured:  22    Secured at:  The lips    Placement Verified By:  Capnometry and Colorimetric ETCO2 device    Complicating Factors:  None    Findings Post-Intubation:  BS equal bilateral and atraumatic/condition of teeth unchanged     mild impairment

## 2023-03-04 NOTE — H&P ADULT - PROBLEM SELECTOR PLAN 7
show
- patient on finasteride and tamsulosin at home for BPH  - no signs of retention at this time  - will hold both medications at this time to avoid dropping patient's BP

## 2023-03-21 NOTE — ED PROVIDER NOTE - NSTIMEPROVIDERCAREINITIATE_GEN_ER
05-Oct-2018 11:32 Picato Counseling:  I discussed with the patient the risks of Picato including but not limited to erythema, scaling, itching, weeping, crusting, and pain.

## 2025-03-12 NOTE — PROVIDER CONTACT NOTE (OTHER) - RECOMMENDATIONS
Hpi Title: Evaluation of Skin Lesions
Patient pronounced dead at 0230, family called. No autopsy as per family.

## 2025-04-14 NOTE — H&P ADULT - NSHPLABSRESULTS_GEN_ALL_CORE
patient LABS:                        10.0   11.16 )-----------( 201      ( 10 Mar 2021 12:53 )             31.7       03-10    140  |  104  |  30<H>  ----------------------------<  94  5.4<H>   |  24  |  0.92    Ca    10.9<H>      10 Mar 2021 12:53    TPro  7.5  /  Alb  3.4  /  TBili  0.3  /  DBili  x   /  AST  55<H>  /  ALT  34  /  AlkPhos  154<H>  03-10          CAPILLARY BLOOD GLUCOSE  97 (10 Mar 2021 12:47)      POCT Blood Glucose.: 97 mg/dL (10 Mar 2021 12:38)      PT/INR - ( 10 Mar 2021 14:27 )   PT: 11.8 sec;   INR: 0.98 ratio         PTT - ( 10 Mar 2021 14:27 )  PTT:25.7 sec    Lactate Trend    RADIOLOGY:    c< from: CT Angio Neck w/ IV Cont (03.10.21 @ 13:21) >    CT brain:  New decreased attenuation in the left anterior basal ganglia/subinsular region suspicious for acute infarction.    Increased density in the region of the M1 segment of the left MCA, likely representing intraluminal clot.    No acute intracranial hemorrhage.    CTA brain:  Lack of flow related enhancement of the left M1 segment, the left MCA bifurcation, and proximal left M2 branches. Decreased flow related enhancement of more distal left MCA branches. Findings are consistent with the presence of intraluminal thrombus or embolus.    No vascular aneurysm. No AVM.    CTA neck:  No flow-limiting stenosis or evidence for arterial dissection.    CT brain perfusion:  Cerebral blood flow less than 30% = 0 mL, therefore no core infarct is predicted.    Tmax greater than 6 seconds = 123 mL and involves much of the left MCA territory. This indicates territory at continued ischemic risk and the presence of neurologic symptoms.    < end of copied text > influenza, injectable, quadrivalent, preservative free; 21-Sep-2018 18:03; Asiya Bhakta (YENY); [employer]; 53NN (Exp. Date: 30-Jun-2019); IntraMuscular; Deltoid Left.; 0.5 milliLiter(s); VIS (VIS Published: 07-Aug-2015, VIS Presented: 21-Sep-2018);    Labs and other data reviewed    ad< from: CT Head No Cont (03.14.21 @ 10:05) >    IMPRESSION:  Evolving left middle cerebral artery territorial infarct increased in size.    No gross acute intracranial hemorrhage                ANNIE COLEY MD; Attending Radiologist  This document has been electronically signed. Mar 14 2021 10:10AM    < end of copied text >    Creatinine, Serum: 1.07 mg/dL

## 2025-07-19 NOTE — CONSULT NOTE ADULT - PROBLEM SELECTOR PROBLEM 3
Hospital Medicine Daily Progress Note    Date of Service  7/19/2025    Chief Complaint  Santino Zabala is a 60 y.o. male admitted 7/15/2025 with chest pain and dyspnea    Hospital Course    60 y.o. male with past medical history of HFrEF, and ICM, severely reduced ejection fraction of 15 to 20%, chronic kidney disease, history of meth use last use was approximately 6 months ago and cirrhosis who presented to the hospital on 7/15/2025 with chest pain and dyspnea.  Patient found to have acute decompensated biventricular failure, elevated liver enzymes and acute on chronic kidney disease.  He was placed on dobutamine infusion and admitted to ICU.  Now he was weaned off from dobutamine.     On July 18, 2025 intensivist Dr. Meyer requested to transfer care to hospital service.    Interval Problem Update    07/19/25    I evaluated and examined him at the bedside.  He reported overall he is feeling better.  I discussed plan of care with him and I answered all his questions.  CBC showed white blood count of 5.1, hemoglobin of 13.5 and platelet count of 177.  CMP showed sodium of 134, potassium of 4.4, BUN of 20 and creatinine of 0.87.  Liver enzymes are elevated AST of 260, ALT of 565 alkaline phosphatase of 146.      I have discussed this patient's plan of care and discharge plan at IDT rounds today with Case Management, Nursing, Nursing leadership, and other members of the IDT team.    Consultants/Specialty  cardiology and critical care    Code Status  Full Code    Disposition  The patient is not medically cleared for discharge to home or a post-acute facility.      I have placed the appropriate orders for post-discharge needs.    Review of Systems  Review of Systems   Constitutional:  Positive for malaise/fatigue. Negative for chills, fever and weight loss.   HENT:  Negative for hearing loss and tinnitus.    Eyes:  Negative for blurred vision, double vision, photophobia and pain.   Respiratory:  Negative for cough,  sputum production and shortness of breath.    Cardiovascular:  Negative for chest pain, palpitations, orthopnea and leg swelling.   Gastrointestinal:  Negative for abdominal pain, constipation, diarrhea, nausea and vomiting.   Genitourinary:  Negative for dysuria, frequency and urgency.   Musculoskeletal:  Negative for back pain, joint pain, myalgias and neck pain.   Skin:  Negative for rash.   Neurological:  Negative for dizziness, tingling, tremors, sensory change, speech change, focal weakness and headaches.   Psychiatric/Behavioral:  Negative for hallucinations and substance abuse.    All other systems reviewed and are negative.       Physical Exam  Temp:  [36.2 °C (97.2 °F)-36.6 °C (97.9 °F)] 36.2 °C (97.2 °F)  Pulse:  [] 97  Resp:  [10-50] 13  BP: ()/(60-85) 120/77  SpO2:  [91 %-100 %] 100 %    Physical Exam  Constitutional:       Appearance: He is ill-appearing.   Cardiovascular:      Rate and Rhythm: Normal rate and regular rhythm.   Musculoskeletal:      Right lower leg: No edema.      Left lower leg: No edema.   Neurological:      General: No focal deficit present.      Mental Status: He is oriented to person, place, and time.      Comments: Following commands         Fluids    Intake/Output Summary (Last 24 hours) at 7/19/2025 0850  Last data filed at 7/19/2025 0800  Gross per 24 hour   Intake 827.29 ml   Output 5745 ml   Net -4917.71 ml        Laboratory  Recent Labs     07/17/25  2112 07/19/25  0258   WBC  --  5.1   RBC  --  4.23*   HEMOGLOBIN 12.7* 13.5*   HEMATOCRIT 38.0* 40.9*   MCV  --  96.7   MCH  --  31.9   MCHC  --  33.0   RDW  --  57.3*   PLATELETCT  --  177   MPV  --  10.6     Recent Labs     07/18/25  0750 07/18/25  1420 07/19/25  0258   SODIUM 134* 133* 134*   POTASSIUM 3.8 3.8 4.4   CHLORIDE 100 101 102   CO2 22 20 22   GLUCOSE 151* 130* 109*   BUN 20 19 20   CREATININE 1.05 0.92 0.87   CALCIUM 8.4* 8.5 8.7                   Imaging  DX-CHEST-LIMITED (1 VIEW)   Final Result       Right [place]-Kaz catheter with tip in the right lower lobe segmental pulmonary artery.      [id number] VIEW   Final Result      No definite evidence for obstruction.      EC-ECHOCARDIOGRAM COMPLETE W/ CONT   Final Result      CT-ABDOMEN-PELVIS W/O   Final Result      1.  Mild ascites.   2.  Body wall anasarca.   3.  Cardiomegaly.   4.  Mild generalized bowel dilation, likely ileus.   5.  Fatty liver.         DX-CHEST-PORTABLE (1 VIEW)   Final Result      Cardiomegaly. No acute process.           Assessment/Plan  * Acute on chronic combined systolic (congestive) and diastolic (congestive) heart failure (HCC)- (present on admission)  Assessment & Plan  Acute exacerbation/decompensation of chronic left-sided systolic heart failure, cause for exacerbation unclear.  History of prior methamphetamine use with cessation, lower suspicion for acute ischemic etiology  Associated with multiorgan dysfunction, SUZY, cardiorenal syndrome, acute ischemic hepatitis  Status post Levophed  - CI 3.1, SVR 800s, off dobutamine since 1600 on 7/17  - Daily weights, I&O, bingham placed for accurate monitoring  - Reinitiation of GDMT heart failure medications as appropriate, hold acutely  - He reports target dry weight of 130 pounds.  Cardiology is following him.  Continue IV Lasix 20 mg twice daily.  Now plan is to transfer him out from IMCU status to telemetry.    Hyponatremia  Assessment & Plan  Mild, continue to monitor.  Current sodium level is 134  In setting of known cirrhosis    Constipation- (present on admission)  Assessment & Plan  Continue bowel regimen    Lactic acidosis- (present on admission)  Assessment & Plan  RESOLVED    Hypoglycemia- (present on admission)  Assessment & Plan  Resolved    Hyperkalemia- (present on admission)  Assessment & Plan  Now resolved    Cardiogenic shock (HCC)- (present on admission)  Assessment & Plan  Shock now resolved    Acute renal failure superimposed on stage 3 chronic kidney disease (HCC)-  (present on admission)  Assessment & Plan  Suspect acutely exacerbated due to cardiorenal syndrome  Continue IV Lasix  Continue Flomax   Avoid nephrotoxins  Medication dose regimen as per renal functions been  Now resolved    Methamphetamine abuse (HCC)- (present on admission)  Assessment & Plan  In remission    Alcoholic cirrhosis of liver with ascites (HCC)- (present on admission)  Assessment & Plan  By history, prior cessation of alcohol and remains abstinent  Current transaminitis likely related to CHF exacerbation  Liver function showed elevated liver enzymes.  Repeat hepatitis panel non-reactove  CT A/P noted with fatty liver change and mild ascites, generalized bowel dilation possible ileus.  No signs of acute abdomen on physical exam   Continue to monitor electrolytes   Outpatient follow-up.  Ordered CMP for tomorrow    Transaminitis- (present on admission)  Assessment & Plan  Suspect secondary to CHF exacerbation and ischemic hepatitis acutely  Treatment as above  - Continue to trend, currently improving  Ordered CMP for tomorrow.      I discussed plan of care with bedside RN and ICU as patient remain in ICU pending bed availability in IMCU.    >51 minutes total time spent in records review, lab/radiology assessment, patient history and assessment, and directing plan of care with high level medical decision making complexity. See orders.      VTE prophylaxis:    enoxaparin ppx      I have performed a physical exam and reviewed and updated ROS and Plan today (7/19/2025). In review of yesterday's note (7/18/2025), there are no changes except as documented above.         Dysphagia, unspecified type